# Patient Record
Sex: FEMALE | Race: BLACK OR AFRICAN AMERICAN | NOT HISPANIC OR LATINO | Employment: FULL TIME | URBAN - METROPOLITAN AREA
[De-identification: names, ages, dates, MRNs, and addresses within clinical notes are randomized per-mention and may not be internally consistent; named-entity substitution may affect disease eponyms.]

---

## 2018-09-19 ENCOUNTER — OFFICE VISIT (OUTPATIENT)
Dept: OBSTETRICS AND GYNECOLOGY | Facility: CLINIC | Age: 23
End: 2018-09-19
Payer: COMMERCIAL

## 2018-09-19 VITALS
BODY MASS INDEX: 30.12 KG/M2 | WEIGHT: 180.75 LBS | SYSTOLIC BLOOD PRESSURE: 122 MMHG | DIASTOLIC BLOOD PRESSURE: 70 MMHG | HEIGHT: 65 IN

## 2018-09-19 DIAGNOSIS — Z30.011 ENCOUNTER FOR INITIAL PRESCRIPTION OF CONTRACEPTIVE PILLS: ICD-10-CM

## 2018-09-19 DIAGNOSIS — A60.9 HSV (HERPES SIMPLEX VIRUS) ANOGENITAL INFECTION: ICD-10-CM

## 2018-09-19 DIAGNOSIS — Z01.419 ENCOUNTER FOR GYNECOLOGICAL EXAMINATION WITHOUT ABNORMAL FINDING: Primary | ICD-10-CM

## 2018-09-19 DIAGNOSIS — Z11.3 SCREENING FOR STDS (SEXUALLY TRANSMITTED DISEASES): ICD-10-CM

## 2018-09-19 PROCEDURE — 99385 PREV VISIT NEW AGE 18-39: CPT | Mod: S$GLB,,, | Performed by: OBSTETRICS & GYNECOLOGY

## 2018-09-19 PROCEDURE — 88175 CYTOPATH C/V AUTO FLUID REDO: CPT

## 2018-09-19 PROCEDURE — 87491 CHLMYD TRACH DNA AMP PROBE: CPT

## 2018-09-19 PROCEDURE — 99999 PR PBB SHADOW E&M-EST. PATIENT-LVL II: CPT | Mod: PBBFAC,,, | Performed by: OBSTETRICS & GYNECOLOGY

## 2018-09-19 RX ORDER — DROSPIRENONE AND ETHINYL ESTRADIOL 0.02-3(28)
1 KIT ORAL DAILY
Qty: 28 TABLET | Refills: 12 | Status: SHIPPED | OUTPATIENT
Start: 2018-09-19 | End: 2019-08-19 | Stop reason: SDUPTHER

## 2018-09-19 RX ORDER — VALACYCLOVIR HYDROCHLORIDE 500 MG/1
500 TABLET, FILM COATED ORAL DAILY
Qty: 30 TABLET | Refills: 12 | Status: SHIPPED | OUTPATIENT
Start: 2018-09-19 | End: 2019-09-14 | Stop reason: SDUPTHER

## 2018-09-19 NOTE — PROGRESS NOTES
9/19/2018    Chlamydia, Amplified DNA Not Detected   N gonorrhoeae, amplified DNA Not Detected       PT HERE FOR ANNUAL.  WOULD LIKE STD SWAB.  SEEMS TO HAVE HSV OUTBREAKS BEFORE HER CYCLE. TAKES VALTREX EPISODIC.  NEEDS REFILL OCP.    ROS:  GENERAL: No fever, chills, fatigability or weight loss.  VULVAR: No pain, no lesions and no itching.  VAGINAL: No relaxation, no itching, no discharge, no abnormal bleeding and no lesions.  ABDOMEN: No abdominal pain. Denies nausea. Denies vomiting. No diarrhea. No constipation  BREAST: Denies pain. No lumps. No discharge.  URINARY: No incontinence, no nocturia, no frequency and no dysuria.  CARDIOVASCULAR: No chest pain. No shortness of breath. No leg cramps.  NEUROLOGICAL: No headaches. No vision changes.  The remainder of the review of systems was negative.    PE:  General Appearance: overweight And Well developed. Well nourished. In no acute distress.  Vulva: Lesions: No.  Urethral Meatus: Normal size. Normal location. No lesions. No prolapse.  Urethra: No masses. No tenderness. No prolapse. No scarring.  Bladder: No masses. No tenderness.  Vagina: Mucosa NI:no discharge no, atrophy no, cystocele no or rectocele no.  Cervix: Lesion: no  Stenotic: no Cervical motion tenderness: no  Uterus: Uterus size: 5 weeks. Support good. Uterus size: Normal  Adnexa: Masses: No Tenderness: No CDS Nodularity: No  Abdomen: overweight No masses. No tenderness.  Breasts: No bilateral masses. No bilateral discharge. No bilateral tenderness. No bilateral fibrocystic changes.  Neck: No thyroid enlargement. No thyroid masses.  Skin: Rashes: No      PROCEDURES:    PLAN:     DIAGNOSIS:  1. Encounter for gynecological examination without abnormal finding    2. Screening for STDs (sexually transmitted diseases)    3. Encounter for initial prescription of contraceptive pills    4. HSV (herpes simplex virus) anogenital infection        MEDICATIONS & ORDERS:  Orders Placed This Encounter    C.  trachomatis/N. gonorrhoeae by AMP DNA    Liquid-based pap smear, screening    drospirenone-ethinyl estradiol (VESTURA, 28,) 3-0.02 mg per tablet    valACYclovir (VALTREX) 500 MG tablet---PROPHYLACTIC       Patient was counseled today on the new ACS guidelines for cervical cytology screening as well as the current recommendations for breast cancer screening. She was counseled to follow up with her PCP for other routine health maintenance. Counseling session lasted approximately 10 minutes, and all her questions were answered.     The use of hormonal contraception has been fully discussed with the patient. We discussed all options including OCPs, transdermal patches, vaginal ring, Depo Provera injections, Implanon, and IUD. Warnings about anticipated minor side effects such as breakthrough spotting, nausea, breast tenderness, weight changes, acne, headaches, etc were given. She has been told of the more serious potential side effects such as MI, stroke, and deep vein thrombosis, all of which are very unlikely. She has been asked to report any signs of such serious problems immediately. The need for additional protection, such as a condom, to prevent exposure to sexually transmitted diseases has also been discussed- the patient has been clearly reminded that no hormonal contraceptive method can protect her against diseases such as HIV and others. She understands and wishes to take the medication as prescribed. She wishes to begin oral contraceptives (estrogen/progesterone)        FOLLOW-UP: With me in 12 month      Answers for HPI/ROS submitted by the patient on 9/17/2018   Gynecologic exam  Pregnant now?: No  Sexual activity: sexually active  Birth control: oral contraceptives  STD: Yes

## 2018-09-20 LAB
C TRACH DNA SPEC QL NAA+PROBE: NOT DETECTED
N GONORRHOEA DNA SPEC QL NAA+PROBE: NOT DETECTED

## 2019-09-13 ENCOUNTER — TELEPHONE (OUTPATIENT)
Dept: OBSTETRICS AND GYNECOLOGY | Facility: CLINIC | Age: 24
End: 2019-09-13

## 2019-09-13 NOTE — TELEPHONE ENCOUNTER
----- Message from Darnell Henriquez sent at 9/13/2019  9:41 AM CDT -----  PT NEEDS A REFILL ON HER JANAY TREX SHE HAS A APPT ON 09/20 PHARMACY # [phone]

## 2019-09-16 RX ORDER — VALACYCLOVIR HYDROCHLORIDE 500 MG/1
TABLET, FILM COATED ORAL
Qty: 30 TABLET | Refills: 12 | Status: SHIPPED | OUTPATIENT
Start: 2019-09-16 | End: 2019-10-14 | Stop reason: SDUPTHER

## 2019-10-14 ENCOUNTER — OFFICE VISIT (OUTPATIENT)
Dept: OBSTETRICS AND GYNECOLOGY | Facility: CLINIC | Age: 24
End: 2019-10-14
Payer: COMMERCIAL

## 2019-10-14 VITALS
DIASTOLIC BLOOD PRESSURE: 82 MMHG | BODY MASS INDEX: 31.22 KG/M2 | HEIGHT: 65 IN | SYSTOLIC BLOOD PRESSURE: 126 MMHG | WEIGHT: 187.38 LBS

## 2019-10-14 DIAGNOSIS — Z11.3 SCREENING FOR STDS (SEXUALLY TRANSMITTED DISEASES): ICD-10-CM

## 2019-10-14 DIAGNOSIS — Z01.419 ENCOUNTER FOR GYNECOLOGICAL EXAMINATION WITHOUT ABNORMAL FINDING: Primary | ICD-10-CM

## 2019-10-14 DIAGNOSIS — Z30.011 ENCOUNTER FOR INITIAL PRESCRIPTION OF CONTRACEPTIVE PILLS: ICD-10-CM

## 2019-10-14 PROCEDURE — 99395 PREV VISIT EST AGE 18-39: CPT | Mod: S$GLB,,, | Performed by: OBSTETRICS & GYNECOLOGY

## 2019-10-14 PROCEDURE — 87491 CHLMYD TRACH DNA AMP PROBE: CPT

## 2019-10-14 PROCEDURE — 99999 PR PBB SHADOW E&M-EST. PATIENT-LVL III: CPT | Mod: PBBFAC,,, | Performed by: OBSTETRICS & GYNECOLOGY

## 2019-10-14 PROCEDURE — 99999 PR PBB SHADOW E&M-EST. PATIENT-LVL III: ICD-10-PCS | Mod: PBBFAC,,, | Performed by: OBSTETRICS & GYNECOLOGY

## 2019-10-14 PROCEDURE — 99395 PR PREVENTIVE VISIT,EST,18-39: ICD-10-PCS | Mod: S$GLB,,, | Performed by: OBSTETRICS & GYNECOLOGY

## 2019-10-14 RX ORDER — VALACYCLOVIR HYDROCHLORIDE 500 MG/1
500 TABLET, FILM COATED ORAL DAILY
Qty: 30 TABLET | Refills: 12 | Status: SHIPPED | OUTPATIENT
Start: 2019-10-14 | End: 2020-09-28

## 2019-10-14 RX ORDER — DROSPIRENONE AND ETHINYL ESTRADIOL 0.02-3(28)
1 KIT ORAL DAILY
Qty: 28 TABLET | Refills: 12 | Status: SHIPPED | OUTPATIENT
Start: 2019-10-14 | End: 2020-10-19

## 2019-10-14 NOTE — PROGRESS NOTES
Pt here for annual.  NEEDS OCP AND VALTREX QD.  WANTS GC/CT.    ROS:  GENERAL: No fever, chills, fatigability or weight loss.  VULVAR: No pain, no lesions and no itching.  VAGINAL: No relaxation, no itching, no discharge, no abnormal bleeding and no lesions.  ABDOMEN: No abdominal pain. Denies nausea. Denies vomiting. No diarrhea. No constipation  BREAST: Denies pain. No lumps. No discharge.  URINARY: No incontinence, no nocturia, no frequency and no dysuria.  CARDIOVASCULAR: No chest pain. No shortness of breath. No leg cramps.  NEUROLOGICAL: No headaches. No vision changes.  The remainder of the review of systems was negative.    PE:  General Appearance: overweight And Well developed. Well nourished. In no acute distress.  Vulva: Lesions: No.  Urethral Meatus: Normal size. Normal location. No lesions. No prolapse.  Urethra: No masses. No tenderness. No prolapse. No scarring.  Bladder: No masses. No tenderness.  Vagina: Mucosa NI:yes discharge no, atrophy no, cystocele no or rectocele no.  Cervix: Lesion: no  Stenotic: no Cervical motion tenderness: no  Uterus: Uterus size: 5 weeks. Support good. Uterus size: Normal  Adnexa: Masses: No Tenderness: No CDS Nodularity: No  Abdomen: overweight No masses. No tenderness.  Breasts: No bilateral masses. No bilateral discharge. No bilateral tenderness. No bilateral fibrocystic changes.  Neck: No thyroid enlargement. No thyroid masses.  Skin: Rashes: No      PROCEDURES:    PLAN:     DIAGNOSIS:  1. Encounter for gynecological examination without abnormal finding    2. Encounter for initial prescription of contraceptive pills    3. Screening for STDs (sexually transmitted diseases)        MEDICATIONS & ORDERS:  Orders Placed This Encounter    C. trachomatis/N. gonorrhoeae by AMP DNA    drospirenone-ethinyl estradiol (VESTURA, 28,) 3-0.02 mg per tablet    valACYclovir (VALTREX) 500 MG tablet       Patient was counseled today on the new ACS guidelines for cervical cytology  screening as well as the current recommendations for breast cancer screening. She was counseled to follow up with her PCP for other routine health maintenance. Counseling session lasted approximately 10 minutes, and all her questions were answered.         FOLLOW-UP: With me in 12 month

## 2019-10-15 LAB
C TRACH DNA SPEC QL NAA+PROBE: NOT DETECTED
N GONORRHOEA DNA SPEC QL NAA+PROBE: NOT DETECTED

## 2020-02-24 ENCOUNTER — TELEPHONE (OUTPATIENT)
Dept: OBSTETRICS AND GYNECOLOGY | Facility: CLINIC | Age: 25
End: 2020-02-24

## 2020-02-24 NOTE — TELEPHONE ENCOUNTER
----- Message from Karen Silva sent at 2/24/2020 11:39 AM CST -----  Contact: ELMO CARRASCO [[id number]]  Type:  Patient Returning Call    Who Called: ELMO CARRASCO [[id number]]    Who Left Message for Patient: Edison    Does the patient know what this is regarding?: Birth control    Can the clinic reply in [facility]: No    Best Call Back Number: [phone]    Additional Information: N/A

## 2020-02-24 NOTE — TELEPHONE ENCOUNTER
----- Message from Gregory Solis sent at 2/24/2020  9:46 AM CST -----  Contact: ELMO CARRASCO [[id number]]  Name of Who is Calling: ELMO CARRASCO [[id number]]      What is the request in detail: Would like to speak with staff in regards to an birth control alternative. [facility] pharmacy changed her to Gianvi which is causing facial out break and spotting. Please advise      Can the clinic reply by [facility]: yes      What Number to Call Back if not in [facility]: [phone]

## 2020-02-24 NOTE — TELEPHONE ENCOUNTER
----- Message from Kendall Hunt sent at 2/24/2020  2:52 PM CST -----  Contact: Patient  Patient is returning missed phone call from Theresa.  Patient phone number [phone]. Thanks!

## 2020-02-26 RX ORDER — DROSPIRENONE AND ETHINYL ESTRADIOL 0.02-3(28)
1 KIT ORAL DAILY
Qty: 28 TABLET | Refills: 12 | Status: SHIPPED | OUTPATIENT
Start: 2020-02-26 | End: 2020-03-27

## 2020-09-28 RX ORDER — VALACYCLOVIR HYDROCHLORIDE 500 MG/1
TABLET, FILM COATED ORAL
Qty: 30 TABLET | Refills: 12 | Status: SHIPPED | OUTPATIENT
Start: 2020-09-28 | End: 2021-09-13

## 2020-09-28 NOTE — TELEPHONE ENCOUNTER
----- Message from Lashell Turcios sent at 9/28/2020 10:16 AM CDT -----  Contact: ELMO CARRASCO [[id number]]  Type: RX Refill Request    Who Called: ELMO CARRASCO [[id number]]    RX Name and Strength: valACYclovir (VALTREX) 500 MG tablet    Preferred Pharmacy with phone number: ..  [facility]  [address], [place] [address]  [phone]       Best Call Back Number: ..[phone]    Additional Information: Pt moved and needs the RX sent to new pharmacy

## 2020-10-19 ENCOUNTER — OFFICE VISIT (OUTPATIENT)
Dept: OBSTETRICS AND GYNECOLOGY | Facility: CLINIC | Age: 25
End: 2020-10-19
Payer: COMMERCIAL

## 2020-10-19 VITALS
SYSTOLIC BLOOD PRESSURE: 120 MMHG | WEIGHT: 187.38 LBS | DIASTOLIC BLOOD PRESSURE: 60 MMHG | HEIGHT: 66 IN | BODY MASS INDEX: 30.11 KG/M2

## 2020-10-19 DIAGNOSIS — Z01.419 ENCOUNTER FOR GYNECOLOGICAL EXAMINATION WITHOUT ABNORMAL FINDING: Primary | ICD-10-CM

## 2020-10-19 PROCEDURE — 99395 PR PREVENTIVE VISIT,EST,18-39: ICD-10-PCS | Mod: S$GLB,,, | Performed by: OBSTETRICS & GYNECOLOGY

## 2020-10-19 PROCEDURE — 99395 PREV VISIT EST AGE 18-39: CPT | Mod: S$GLB,,, | Performed by: OBSTETRICS & GYNECOLOGY

## 2020-10-19 PROCEDURE — 99999 PR PBB SHADOW E&M-EST. PATIENT-LVL III: ICD-10-PCS | Mod: PBBFAC,,, | Performed by: OBSTETRICS & GYNECOLOGY

## 2020-10-19 PROCEDURE — 99999 PR PBB SHADOW E&M-EST. PATIENT-LVL III: CPT | Mod: PBBFAC,,, | Performed by: OBSTETRICS & GYNECOLOGY

## 2020-10-19 RX ORDER — NORETHINDRONE ACETATE AND ETHINYL ESTRADIOL .03; 1.5 MG/1; MG/1
1 TABLET ORAL DAILY
Qty: 28 EACH | Refills: 12 | Status: SHIPPED | OUTPATIENT
Start: 2020-10-19 | End: 2021-07-28

## 2020-10-19 NOTE — PROGRESS NOTES
PT HERE FOR ANNUAL.  SHE FEELS MOR EMOTIONAL ON ASIF.    ROS:  GENERAL: No fever, chills, fatigability or weight loss.  VULVAR: No pain, no lesions and no itching.  VAGINAL: No relaxation, no itching, no discharge, no abnormal bleeding and no lesions.  ABDOMEN: No abdominal pain. Denies nausea. Denies vomiting. No diarrhea. No constipation  BREAST: Denies pain. No lumps. No discharge.  URINARY: No incontinence, no nocturia, no frequency and no dysuria.  CARDIOVASCULAR: No chest pain. No shortness of breath. No leg cramps.  NEUROLOGICAL: No headaches. No vision changes.  The remainder of the review of systems was negative.    PE:  General Appearance: overweight And Well developed. Well nourished. In no acute distress.  Vulva: Lesions: No.  Urethral Meatus: Normal size. Normal location. No lesions. No prolapse.  Urethra: No masses. No tenderness. No prolapse. No scarring.  Bladder: No masses. No tenderness.  Vagina: Mucosa NI:yes discharge no, atrophy no, cystocele no or rectocele no.  Cervix: Lesion: no  Stenotic: no Cervical motion tenderness: no  Uterus: Uterus size: 5 weeks. Support good. Uterus size: Normal  Adnexa: Masses: No Tenderness: No CDS Nodularity: No  Abdomen: overweight No masses. No tenderness.  Breasts: No bilateral masses. No bilateral discharge. No bilateral tenderness. No bilateral fibrocystic changes.  Neck: No thyroid enlargement. No thyroid masses.  Skin: Rashes: No      PROCEDURES:    PLAN:     DIAGNOSIS:  1. Encounter for gynecological examination without abnormal finding        MEDICATIONS & ORDERS:  Orders Placed This Encounter    norethindrone ac-eth estradioL (LOESTRIN 1.5/30, 21,) 1.5-30 mg-mcg Tab       Patient was counseled today on the new ACS guidelines for cervical cytology screening as well as the current recommendations for breast cancer screening. She was counseled to follow up with her PCP for other routine health maintenance. Counseling session lasted approximately 10 minutes,  and all her questions were answered.     The use of hormonal contraception has been fully discussed with the patient. We discussed all options including OCPs, transdermal patches, vaginal ring, Depo Provera injections, Implanon, and IUD. Warnings about anticipated minor side effects such as breakthrough spotting, nausea, breast tenderness, weight changes, acne, headaches, etc were given. She has been told of the more serious potential side effects such as MI, stroke, and deep vein thrombosis, all of which are very unlikely. She has been asked to report any signs of such serious problems immediately. The need for additional protection, such as a condom, to prevent exposure to sexually transmitted diseases has also been discussed- the patient has been clearly reminded that no hormonal contraceptive method can protect her against diseases such as HIV and others. She understands and wishes to take the medication as prescribed. She wishes to begin oral contraceptives (estrogen/progesterone)        FOLLOW-UP: With me in 12 month

## 2021-04-22 ENCOUNTER — PATIENT MESSAGE (OUTPATIENT)
Dept: UROLOGY | Facility: CLINIC | Age: 26
End: 2021-04-22

## 2021-05-10 ENCOUNTER — PATIENT MESSAGE (OUTPATIENT)
Dept: RESEARCH | Facility: HOSPITAL | Age: 26
End: 2021-05-10

## 2021-09-13 ENCOUNTER — OFFICE VISIT (OUTPATIENT)
Dept: OBSTETRICS AND GYNECOLOGY | Facility: CLINIC | Age: 26
End: 2021-09-13
Payer: COMMERCIAL

## 2021-09-13 VITALS — HEIGHT: 66 IN | WEIGHT: 178.56 LBS | BODY MASS INDEX: 28.7 KG/M2

## 2021-09-13 DIAGNOSIS — Z32.01 PREGNANCY CONFIRMED BY POSITIVE URINE TEST: ICD-10-CM

## 2021-09-13 DIAGNOSIS — N91.2 AMENORRHEA: Primary | ICD-10-CM

## 2021-09-13 PROBLEM — Z34.90 PREGNANT: Status: ACTIVE | Noted: 2021-09-13

## 2021-09-13 LAB
B-HCG UR QL: POSITIVE
CTP QC/QA: YES

## 2021-09-13 PROCEDURE — 3008F BODY MASS INDEX DOCD: CPT | Mod: CPTII,S$GLB,, | Performed by: OBSTETRICS & GYNECOLOGY

## 2021-09-13 PROCEDURE — 99214 OFFICE O/P EST MOD 30 MIN: CPT | Mod: S$GLB,,, | Performed by: OBSTETRICS & GYNECOLOGY

## 2021-09-13 PROCEDURE — 99999 PR PBB SHADOW E&M-EST. PATIENT-LVL III: CPT | Mod: PBBFAC,,, | Performed by: OBSTETRICS & GYNECOLOGY

## 2021-09-13 PROCEDURE — 1160F PR REVIEW ALL MEDS BY PRESCRIBER/CLIN PHARMACIST DOCUMENTED: ICD-10-PCS | Mod: CPTII,S$GLB,, | Performed by: OBSTETRICS & GYNECOLOGY

## 2021-09-13 PROCEDURE — 1159F PR MEDICATION LIST DOCUMENTED IN MEDICAL RECORD: ICD-10-PCS | Mod: CPTII,S$GLB,, | Performed by: OBSTETRICS & GYNECOLOGY

## 2021-09-13 PROCEDURE — 1160F RVW MEDS BY RX/DR IN RCRD: CPT | Mod: CPTII,S$GLB,, | Performed by: OBSTETRICS & GYNECOLOGY

## 2021-09-13 PROCEDURE — 87086 URINE CULTURE/COLONY COUNT: CPT | Performed by: OBSTETRICS & GYNECOLOGY

## 2021-09-13 PROCEDURE — 99214 PR OFFICE/OUTPT VISIT, EST, LEVL IV, 30-39 MIN: ICD-10-PCS | Mod: S$GLB,,, | Performed by: OBSTETRICS & GYNECOLOGY

## 2021-09-13 PROCEDURE — 99999 PR PBB SHADOW E&M-EST. PATIENT-LVL III: ICD-10-PCS | Mod: PBBFAC,,, | Performed by: OBSTETRICS & GYNECOLOGY

## 2021-09-13 PROCEDURE — 3008F PR BODY MASS INDEX (BMI) DOCUMENTED: ICD-10-PCS | Mod: CPTII,S$GLB,, | Performed by: OBSTETRICS & GYNECOLOGY

## 2021-09-13 PROCEDURE — 81025 POCT URINE PREGNANCY: ICD-10-PCS | Mod: S$GLB,,, | Performed by: OBSTETRICS & GYNECOLOGY

## 2021-09-13 PROCEDURE — 88175 CYTOPATH C/V AUTO FLUID REDO: CPT | Performed by: OBSTETRICS & GYNECOLOGY

## 2021-09-13 PROCEDURE — 87491 CHLMYD TRACH DNA AMP PROBE: CPT | Performed by: OBSTETRICS & GYNECOLOGY

## 2021-09-13 PROCEDURE — 1159F MED LIST DOCD IN RCRD: CPT | Mod: CPTII,S$GLB,, | Performed by: OBSTETRICS & GYNECOLOGY

## 2021-09-13 PROCEDURE — 87591 N.GONORRHOEAE DNA AMP PROB: CPT | Performed by: OBSTETRICS & GYNECOLOGY

## 2021-09-13 PROCEDURE — 81025 URINE PREGNANCY TEST: CPT | Mod: S$GLB,,, | Performed by: OBSTETRICS & GYNECOLOGY

## 2021-09-13 RX ORDER — PROMETHAZINE HYDROCHLORIDE 25 MG/1
25 TABLET ORAL EVERY 4 HOURS
Qty: 30 TABLET | Refills: 3 | Status: SHIPPED | OUTPATIENT
Start: 2021-09-13 | End: 2021-10-21

## 2021-09-14 LAB
C TRACH DNA SPEC QL NAA+PROBE: NOT DETECTED
N GONORRHOEA DNA SPEC QL NAA+PROBE: NOT DETECTED

## 2021-09-15 LAB
BACTERIA UR CULT: NORMAL
BACTERIA UR CULT: NORMAL

## 2021-09-16 LAB
FINAL PATHOLOGIC DIAGNOSIS: NORMAL
Lab: NORMAL

## 2021-09-20 ENCOUNTER — PROCEDURE VISIT (OUTPATIENT)
Dept: OBSTETRICS AND GYNECOLOGY | Facility: CLINIC | Age: 26
End: 2021-09-20
Payer: COMMERCIAL

## 2021-09-20 DIAGNOSIS — Z32.01 PREGNANCY CONFIRMED BY POSITIVE URINE TEST: ICD-10-CM

## 2021-09-20 DIAGNOSIS — Z36.89 ESTABLISH GESTATIONAL AGE, ULTRASOUND: ICD-10-CM

## 2021-09-20 PROCEDURE — 76801 PR US, OB <14WKS, TRANSABD, SINGLE GESTATION: ICD-10-PCS | Mod: S$GLB,,, | Performed by: OBSTETRICS & GYNECOLOGY

## 2021-09-20 PROCEDURE — 76801 OB US < 14 WKS SINGLE FETUS: CPT | Mod: S$GLB,,, | Performed by: OBSTETRICS & GYNECOLOGY

## 2021-09-23 ENCOUNTER — PATIENT MESSAGE (OUTPATIENT)
Dept: OBSTETRICS AND GYNECOLOGY | Facility: CLINIC | Age: 26
End: 2021-09-23

## 2021-09-27 ENCOUNTER — LAB VISIT (OUTPATIENT)
Dept: LAB | Facility: HOSPITAL | Age: 26
End: 2021-09-27
Attending: OBSTETRICS & GYNECOLOGY
Payer: COMMERCIAL

## 2021-09-27 DIAGNOSIS — Z32.01 PREGNANCY CONFIRMED BY POSITIVE URINE TEST: ICD-10-CM

## 2021-09-27 LAB
ABO + RH BLD: NORMAL
ANISOCYTOSIS BLD QL SMEAR: SLIGHT
BASOPHILS # BLD AUTO: 0.03 K/UL (ref 0–0.2)
BASOPHILS NFR BLD: 0.2 % (ref 0–1.9)
BLD GP AB SCN CELLS X3 SERPL QL: NORMAL
BURR CELLS BLD QL SMEAR: ABNORMAL
DIFFERENTIAL METHOD: ABNORMAL
EOSINOPHIL # BLD AUTO: 0.1 K/UL (ref 0–0.5)
EOSINOPHIL NFR BLD: 0.5 % (ref 0–8)
ERYTHROCYTE [DISTWIDTH] IN BLOOD BY AUTOMATED COUNT: 12.6 % (ref 11.5–14.5)
HCT VFR BLD AUTO: 38.4 % (ref 37–48.5)
HGB BLD-MCNC: 12.7 G/DL (ref 12–16)
IMM GRANULOCYTES # BLD AUTO: 0.05 K/UL (ref 0–0.04)
IMM GRANULOCYTES NFR BLD AUTO: 0.4 % (ref 0–0.5)
LYMPHOCYTES # BLD AUTO: 3 K/UL (ref 1–4.8)
LYMPHOCYTES NFR BLD: 22.9 % (ref 18–48)
MCH RBC QN AUTO: 30.5 PG (ref 27–31)
MCHC RBC AUTO-ENTMCNC: 33.1 G/DL (ref 32–36)
MCV RBC AUTO: 92 FL (ref 82–98)
MONOCYTES # BLD AUTO: 0.9 K/UL (ref 0.3–1)
MONOCYTES NFR BLD: 7.1 % (ref 4–15)
NEUTROPHILS # BLD AUTO: 9 K/UL (ref 1.8–7.7)
NEUTROPHILS NFR BLD: 68.9 % (ref 38–73)
NRBC BLD-RTO: 0 /100 WBC
PLATELET # BLD AUTO: 248 K/UL (ref 150–450)
PLATELET BLD QL SMEAR: ABNORMAL
PMV BLD AUTO: 11.5 FL (ref 9.2–12.9)
POIKILOCYTOSIS BLD QL SMEAR: SLIGHT
POLYCHROMASIA BLD QL SMEAR: ABNORMAL
RBC # BLD AUTO: 4.17 M/UL (ref 4–5.4)
WBC # BLD AUTO: 13 K/UL (ref 3.9–12.7)

## 2021-09-27 PROCEDURE — 81220 CFTR GENE COM VARIANTS: CPT | Performed by: OBSTETRICS & GYNECOLOGY

## 2021-09-27 PROCEDURE — 87389 HIV-1 AG W/HIV-1&-2 AB AG IA: CPT | Performed by: OBSTETRICS & GYNECOLOGY

## 2021-09-27 PROCEDURE — 86900 BLOOD TYPING SEROLOGIC ABO: CPT | Performed by: OBSTETRICS & GYNECOLOGY

## 2021-09-27 PROCEDURE — 83020 HEMOGLOBIN ELECTROPHORESIS: CPT | Performed by: OBSTETRICS & GYNECOLOGY

## 2021-09-27 PROCEDURE — 86592 SYPHILIS TEST NON-TREP QUAL: CPT | Performed by: OBSTETRICS & GYNECOLOGY

## 2021-09-27 PROCEDURE — 86762 RUBELLA ANTIBODY: CPT | Performed by: OBSTETRICS & GYNECOLOGY

## 2021-09-27 PROCEDURE — 85025 COMPLETE CBC W/AUTO DIFF WBC: CPT | Performed by: OBSTETRICS & GYNECOLOGY

## 2021-09-27 PROCEDURE — 87340 HEPATITIS B SURFACE AG IA: CPT | Performed by: OBSTETRICS & GYNECOLOGY

## 2021-09-28 LAB
HBV SURFACE AG SERPL QL IA: NEGATIVE
HIV 1+2 AB+HIV1 P24 AG SERPL QL IA: NEGATIVE
RPR SER QL: NORMAL
RUBV IGG SER-ACNC: 29.4 IU/ML
RUBV IGG SER-IMP: REACTIVE

## 2021-09-29 LAB
HGB A2 MFR BLD HPLC: 2.7 % (ref 2.2–3.2)
HGB FRACT BLD ELPH-IMP: NORMAL
HGB FRACT BLD ELPH-IMP: NORMAL

## 2021-09-30 LAB — CFTR MUT ANL BLD/T: NORMAL

## 2021-10-18 ENCOUNTER — PATIENT MESSAGE (OUTPATIENT)
Dept: OBSTETRICS AND GYNECOLOGY | Facility: CLINIC | Age: 26
End: 2021-10-18
Payer: COMMERCIAL

## 2021-10-21 ENCOUNTER — INITIAL PRENATAL (OUTPATIENT)
Dept: OBSTETRICS AND GYNECOLOGY | Facility: CLINIC | Age: 26
End: 2021-10-21
Payer: OTHER GOVERNMENT

## 2021-10-21 VITALS
DIASTOLIC BLOOD PRESSURE: 74 MMHG | BODY MASS INDEX: 30.78 KG/M2 | SYSTOLIC BLOOD PRESSURE: 116 MMHG | WEIGHT: 190.69 LBS

## 2021-10-21 DIAGNOSIS — Z3A.12 PREGNANCY WITH 12 COMPLETED WEEKS GESTATION: Primary | ICD-10-CM

## 2021-10-21 PROCEDURE — 99999 PR PBB SHADOW E&M-EST. PATIENT-LVL II: CPT | Mod: PBBFAC,,, | Performed by: OBSTETRICS & GYNECOLOGY

## 2021-10-21 PROCEDURE — 99212 OFFICE O/P EST SF 10 MIN: CPT | Mod: PBBFAC | Performed by: OBSTETRICS & GYNECOLOGY

## 2021-10-21 PROCEDURE — 99999 PR PBB SHADOW E&M-EST. PATIENT-LVL II: ICD-10-PCS | Mod: PBBFAC,,, | Performed by: OBSTETRICS & GYNECOLOGY

## 2021-10-21 PROCEDURE — 0502F SUBSEQUENT PRENATAL CARE: CPT | Mod: S$GLB,,, | Performed by: OBSTETRICS & GYNECOLOGY

## 2021-10-21 PROCEDURE — 0502F PR SUBSEQUENT PRENATAL CARE: ICD-10-PCS | Mod: S$GLB,,, | Performed by: OBSTETRICS & GYNECOLOGY

## 2021-10-27 ENCOUNTER — PATIENT MESSAGE (OUTPATIENT)
Dept: ADMINISTRATIVE | Facility: OTHER | Age: 26
End: 2021-10-27
Payer: OTHER GOVERNMENT

## 2021-11-19 ENCOUNTER — ROUTINE PRENATAL (OUTPATIENT)
Dept: OBSTETRICS AND GYNECOLOGY | Facility: CLINIC | Age: 26
End: 2021-11-19
Payer: OTHER GOVERNMENT

## 2021-11-19 ENCOUNTER — LAB VISIT (OUTPATIENT)
Dept: LAB | Facility: OTHER | Age: 26
End: 2021-11-19
Attending: PHYSICIAN ASSISTANT
Payer: OTHER GOVERNMENT

## 2021-11-19 VITALS
SYSTOLIC BLOOD PRESSURE: 142 MMHG | WEIGHT: 198.19 LBS | DIASTOLIC BLOOD PRESSURE: 76 MMHG | BODY MASS INDEX: 31.99 KG/M2

## 2021-11-19 DIAGNOSIS — R03.0 ELEVATED BLOOD PRESSURE READING: ICD-10-CM

## 2021-11-19 DIAGNOSIS — Z3A.16 16 WEEKS GESTATION OF PREGNANCY: Primary | ICD-10-CM

## 2021-11-19 LAB
ALBUMIN SERPL BCP-MCNC: 3.3 G/DL (ref 3.5–5.2)
ALP SERPL-CCNC: 43 U/L (ref 55–135)
ALT SERPL W/O P-5'-P-CCNC: 17 U/L (ref 10–44)
ANION GAP SERPL CALC-SCNC: 12 MMOL/L (ref 8–16)
AST SERPL-CCNC: 20 U/L (ref 10–40)
BASOPHILS # BLD AUTO: 0.02 K/UL (ref 0–0.2)
BASOPHILS NFR BLD: 0.1 % (ref 0–1.9)
BILIRUB SERPL-MCNC: 0.2 MG/DL (ref 0.1–1)
BUN SERPL-MCNC: 7 MG/DL (ref 6–20)
CALCIUM SERPL-MCNC: 9.3 MG/DL (ref 8.7–10.5)
CHLORIDE SERPL-SCNC: 106 MMOL/L (ref 95–110)
CO2 SERPL-SCNC: 19 MMOL/L (ref 23–29)
CREAT SERPL-MCNC: 0.5 MG/DL (ref 0.5–1.4)
CREAT UR-MCNC: 121 MG/DL (ref 15–325)
DIFFERENTIAL METHOD: ABNORMAL
EOSINOPHIL # BLD AUTO: 0.1 K/UL (ref 0–0.5)
EOSINOPHIL NFR BLD: 0.4 % (ref 0–8)
ERYTHROCYTE [DISTWIDTH] IN BLOOD BY AUTOMATED COUNT: 12.5 % (ref 11.5–14.5)
EST. GFR  (AFRICAN AMERICAN): >60 ML/MIN/1.73 M^2
EST. GFR  (NON AFRICAN AMERICAN): >60 ML/MIN/1.73 M^2
GLUCOSE SERPL-MCNC: 73 MG/DL (ref 70–110)
HCT VFR BLD AUTO: 37.7 % (ref 37–48.5)
HGB BLD-MCNC: 12.3 G/DL (ref 12–16)
IMM GRANULOCYTES # BLD AUTO: 0.11 K/UL (ref 0–0.04)
IMM GRANULOCYTES NFR BLD AUTO: 0.8 % (ref 0–0.5)
LDH SERPL L TO P-CCNC: 119 U/L (ref 110–260)
LYMPHOCYTES # BLD AUTO: 2.3 K/UL (ref 1–4.8)
LYMPHOCYTES NFR BLD: 15.9 % (ref 18–48)
MCH RBC QN AUTO: 30.3 PG (ref 27–31)
MCHC RBC AUTO-ENTMCNC: 32.6 G/DL (ref 32–36)
MCV RBC AUTO: 93 FL (ref 82–98)
MONOCYTES # BLD AUTO: 1.1 K/UL (ref 0.3–1)
MONOCYTES NFR BLD: 7.7 % (ref 4–15)
NEUTROPHILS # BLD AUTO: 10.7 K/UL (ref 1.8–7.7)
NEUTROPHILS NFR BLD: 75.1 % (ref 38–73)
NRBC BLD-RTO: 0 /100 WBC
PLATELET # BLD AUTO: 277 K/UL (ref 150–450)
PMV BLD AUTO: 10.3 FL (ref 9.2–12.9)
POTASSIUM SERPL-SCNC: 4.6 MMOL/L (ref 3.5–5.1)
PROT SERPL-MCNC: 6.9 G/DL (ref 6–8.4)
PROT UR-MCNC: 9 MG/DL (ref 0–15)
PROT/CREAT UR: 0.07 MG/G{CREAT} (ref 0–0.2)
RBC # BLD AUTO: 4.06 M/UL (ref 4–5.4)
SODIUM SERPL-SCNC: 137 MMOL/L (ref 136–145)
WBC # BLD AUTO: 14.21 K/UL (ref 3.9–12.7)

## 2021-11-19 PROCEDURE — 0502F SUBSEQUENT PRENATAL CARE: CPT | Mod: S$GLB,,, | Performed by: PHYSICIAN ASSISTANT

## 2021-11-19 PROCEDURE — 99999 PR PBB SHADOW E&M-EST. PATIENT-LVL II: ICD-10-PCS | Mod: PBBFAC,,, | Performed by: PHYSICIAN ASSISTANT

## 2021-11-19 PROCEDURE — 82570 ASSAY OF URINE CREATININE: CPT | Performed by: PHYSICIAN ASSISTANT

## 2021-11-19 PROCEDURE — 83615 LACTATE (LD) (LDH) ENZYME: CPT | Performed by: PHYSICIAN ASSISTANT

## 2021-11-19 PROCEDURE — 0502F PR SUBSEQUENT PRENATAL CARE: ICD-10-PCS | Mod: S$GLB,,, | Performed by: PHYSICIAN ASSISTANT

## 2021-11-19 PROCEDURE — 99999 PR PBB SHADOW E&M-EST. PATIENT-LVL II: CPT | Mod: PBBFAC,,, | Performed by: PHYSICIAN ASSISTANT

## 2021-11-19 PROCEDURE — 36415 COLL VENOUS BLD VENIPUNCTURE: CPT | Performed by: PHYSICIAN ASSISTANT

## 2021-11-19 PROCEDURE — 80053 COMPREHEN METABOLIC PANEL: CPT | Performed by: PHYSICIAN ASSISTANT

## 2021-11-19 PROCEDURE — 85025 COMPLETE CBC W/AUTO DIFF WBC: CPT | Performed by: PHYSICIAN ASSISTANT

## 2021-12-07 ENCOUNTER — PATIENT MESSAGE (OUTPATIENT)
Dept: MATERNAL FETAL MEDICINE | Facility: CLINIC | Age: 26
End: 2021-12-07
Payer: OTHER GOVERNMENT

## 2021-12-08 ENCOUNTER — TELEPHONE (OUTPATIENT)
Dept: RESEARCH | Facility: OTHER | Age: 26
End: 2021-12-08
Payer: OTHER GOVERNMENT

## 2021-12-08 ENCOUNTER — PROCEDURE VISIT (OUTPATIENT)
Dept: MATERNAL FETAL MEDICINE | Facility: CLINIC | Age: 26
End: 2021-12-08
Payer: OTHER GOVERNMENT

## 2021-12-08 DIAGNOSIS — Z3A.12 PREGNANCY WITH 12 COMPLETED WEEKS GESTATION: ICD-10-CM

## 2021-12-13 ENCOUNTER — ROUTINE PRENATAL (OUTPATIENT)
Dept: OBSTETRICS AND GYNECOLOGY | Facility: CLINIC | Age: 26
End: 2021-12-13
Payer: OTHER GOVERNMENT

## 2021-12-13 ENCOUNTER — LAB VISIT (OUTPATIENT)
Dept: LAB | Facility: HOSPITAL | Age: 26
End: 2021-12-13
Attending: OBSTETRICS & GYNECOLOGY
Payer: OTHER GOVERNMENT

## 2021-12-13 VITALS — WEIGHT: 200 LBS | DIASTOLIC BLOOD PRESSURE: 78 MMHG | BODY MASS INDEX: 32.28 KG/M2 | SYSTOLIC BLOOD PRESSURE: 142 MMHG

## 2021-12-13 DIAGNOSIS — Z3A.19 19 WEEKS GESTATION OF PREGNANCY: Primary | ICD-10-CM

## 2021-12-13 DIAGNOSIS — I10 HTN (HYPERTENSION), BENIGN: ICD-10-CM

## 2021-12-13 LAB
ALBUMIN SERPL BCP-MCNC: 3.6 G/DL (ref 3.5–5.2)
ALP SERPL-CCNC: 46 U/L (ref 55–135)
ALT SERPL W/O P-5'-P-CCNC: 12 U/L (ref 10–44)
ANION GAP SERPL CALC-SCNC: 15 MMOL/L (ref 8–16)
AST SERPL-CCNC: 16 U/L (ref 10–40)
BILIRUB SERPL-MCNC: 0.3 MG/DL (ref 0.1–1)
BUN SERPL-MCNC: 6 MG/DL (ref 6–20)
CALCIUM SERPL-MCNC: 9.6 MG/DL (ref 8.7–10.5)
CHLORIDE SERPL-SCNC: 104 MMOL/L (ref 95–110)
CO2 SERPL-SCNC: 19 MMOL/L (ref 23–29)
CREAT SERPL-MCNC: 0.6 MG/DL (ref 0.5–1.4)
EST. GFR  (AFRICAN AMERICAN): >60 ML/MIN/1.73 M^2
EST. GFR  (NON AFRICAN AMERICAN): >60 ML/MIN/1.73 M^2
GLUCOSE SERPL-MCNC: 70 MG/DL (ref 70–110)
LDH SERPL L TO P-CCNC: 154 U/L (ref 110–260)
POTASSIUM SERPL-SCNC: 3.6 MMOL/L (ref 3.5–5.1)
PROT SERPL-MCNC: 7.4 G/DL (ref 6–8.4)
SODIUM SERPL-SCNC: 138 MMOL/L (ref 136–145)

## 2021-12-13 PROCEDURE — 0502F SUBSEQUENT PRENATAL CARE: CPT | Mod: S$GLB,,, | Performed by: OBSTETRICS & GYNECOLOGY

## 2021-12-13 PROCEDURE — 0502F PR SUBSEQUENT PRENATAL CARE: ICD-10-PCS | Mod: S$GLB,,, | Performed by: OBSTETRICS & GYNECOLOGY

## 2021-12-13 PROCEDURE — 83615 LACTATE (LD) (LDH) ENZYME: CPT | Performed by: OBSTETRICS & GYNECOLOGY

## 2021-12-13 PROCEDURE — 99999 PR PBB SHADOW E&M-EST. PATIENT-LVL II: ICD-10-PCS | Mod: PBBFAC,,, | Performed by: OBSTETRICS & GYNECOLOGY

## 2021-12-13 PROCEDURE — 36415 COLL VENOUS BLD VENIPUNCTURE: CPT | Performed by: OBSTETRICS & GYNECOLOGY

## 2021-12-13 PROCEDURE — 80053 COMPREHEN METABOLIC PANEL: CPT | Performed by: OBSTETRICS & GYNECOLOGY

## 2021-12-13 PROCEDURE — 99999 PR PBB SHADOW E&M-EST. PATIENT-LVL II: CPT | Mod: PBBFAC,,, | Performed by: OBSTETRICS & GYNECOLOGY

## 2021-12-13 RX ORDER — NAPROXEN SODIUM 220 MG/1
81 TABLET, FILM COATED ORAL DAILY
Qty: 30 TABLET | Refills: 12 | Status: SHIPPED | OUTPATIENT
Start: 2021-12-13 | End: 2022-09-16

## 2022-01-07 ENCOUNTER — PATIENT MESSAGE (OUTPATIENT)
Dept: MATERNAL FETAL MEDICINE | Facility: CLINIC | Age: 27
End: 2022-01-07
Payer: OTHER GOVERNMENT

## 2022-01-07 DIAGNOSIS — Z36.2 ENCOUNTER FOR FOLLOW-UP ULTRASOUND OF FETAL ANATOMY: Primary | ICD-10-CM

## 2022-01-10 ENCOUNTER — ROUTINE PRENATAL (OUTPATIENT)
Dept: OBSTETRICS AND GYNECOLOGY | Facility: CLINIC | Age: 27
End: 2022-01-10
Payer: OTHER GOVERNMENT

## 2022-01-10 ENCOUNTER — PROCEDURE VISIT (OUTPATIENT)
Dept: MATERNAL FETAL MEDICINE | Facility: CLINIC | Age: 27
End: 2022-01-10
Payer: OTHER GOVERNMENT

## 2022-01-10 VITALS
WEIGHT: 208.56 LBS | DIASTOLIC BLOOD PRESSURE: 78 MMHG | BODY MASS INDEX: 33.66 KG/M2 | SYSTOLIC BLOOD PRESSURE: 142 MMHG

## 2022-01-10 DIAGNOSIS — Z23 NEED FOR DIPHTHERIA-TETANUS-PERTUSSIS (TDAP) VACCINE: Primary | ICD-10-CM

## 2022-01-10 DIAGNOSIS — Z3A.23 23 WEEKS GESTATION OF PREGNANCY: ICD-10-CM

## 2022-01-10 DIAGNOSIS — Z36.2 ENCOUNTER FOR FOLLOW-UP ULTRASOUND OF FETAL ANATOMY: ICD-10-CM

## 2022-01-10 DIAGNOSIS — Z13.1 SCREENING FOR DIABETES MELLITUS: ICD-10-CM

## 2022-01-10 PROCEDURE — 76816 US MFM PROCEDURE (VIEWPOINT): ICD-10-PCS | Mod: S$GLB,,, | Performed by: OBSTETRICS & GYNECOLOGY

## 2022-01-10 PROCEDURE — 0502F PR SUBSEQUENT PRENATAL CARE: ICD-10-PCS | Mod: S$GLB,,, | Performed by: PHYSICIAN ASSISTANT

## 2022-01-10 PROCEDURE — 76816 OB US FOLLOW-UP PER FETUS: CPT | Mod: S$GLB,,, | Performed by: OBSTETRICS & GYNECOLOGY

## 2022-01-10 PROCEDURE — 99999 PR PBB SHADOW E&M-EST. PATIENT-LVL II: CPT | Mod: PBBFAC,,, | Performed by: PHYSICIAN ASSISTANT

## 2022-01-10 PROCEDURE — 99999 PR PBB SHADOW E&M-EST. PATIENT-LVL II: ICD-10-PCS | Mod: PBBFAC,,, | Performed by: PHYSICIAN ASSISTANT

## 2022-01-10 PROCEDURE — 0502F SUBSEQUENT PRENATAL CARE: CPT | Mod: S$GLB,,, | Performed by: PHYSICIAN ASSISTANT

## 2022-01-10 NOTE — PROGRESS NOTES
Pt presents for MAYELA visit at 23w5d.    Patient with no complaints. Denies vaginal bleeding or contractions. Good FM.  labor precautions discussed with patient.     GTT/CBC at next visit.     Blood type: O POS; T&S/Rhogam not required.  Tdap at 28 weeks - ordered and scheduled.  Follow-up anatomy scan today.    RTC in 4 weeks with Dr. Garcia - 2/10.    Vitals signs, FHTs, urine dip, and PE findings documented, reviewed and available in OB flow chart.

## 2022-02-09 ENCOUNTER — PATIENT MESSAGE (OUTPATIENT)
Dept: ADMINISTRATIVE | Facility: OTHER | Age: 27
End: 2022-02-09
Payer: OTHER GOVERNMENT

## 2022-02-10 ENCOUNTER — HOSPITAL ENCOUNTER (EMERGENCY)
Facility: OTHER | Age: 27
Discharge: HOME | End: 2022-02-10
Attending: OBSTETRICS & GYNECOLOGY
Payer: OTHER GOVERNMENT

## 2022-02-10 ENCOUNTER — CLINICAL SUPPORT (OUTPATIENT)
Dept: OBSTETRICS AND GYNECOLOGY | Facility: CLINIC | Age: 27
End: 2022-02-10
Payer: OTHER GOVERNMENT

## 2022-02-10 ENCOUNTER — LAB VISIT (OUTPATIENT)
Dept: LAB | Facility: OTHER | Age: 27
End: 2022-02-10
Attending: PHYSICIAN ASSISTANT
Payer: OTHER GOVERNMENT

## 2022-02-10 VITALS
BODY MASS INDEX: 34.98 KG/M2 | WEIGHT: 216.69 LBS | DIASTOLIC BLOOD PRESSURE: 80 MMHG | SYSTOLIC BLOOD PRESSURE: 170 MMHG

## 2022-02-10 VITALS
HEART RATE: 105 BPM | SYSTOLIC BLOOD PRESSURE: 146 MMHG | RESPIRATION RATE: 18 BRPM | DIASTOLIC BLOOD PRESSURE: 84 MMHG | HEIGHT: 66 IN | WEIGHT: 216.69 LBS | TEMPERATURE: 98 F | OXYGEN SATURATION: 100 % | BODY MASS INDEX: 34.82 KG/M2

## 2022-02-10 DIAGNOSIS — Z13.1 SCREENING FOR DIABETES MELLITUS: ICD-10-CM

## 2022-02-10 DIAGNOSIS — Z3A.28 PREGNANCY WITH 28 COMPLETED WEEKS GESTATION: Primary | ICD-10-CM

## 2022-02-10 DIAGNOSIS — I10 CHRONIC HYPERTENSION: ICD-10-CM

## 2022-02-10 DIAGNOSIS — Z23 NEED FOR DIPHTHERIA-TETANUS-PERTUSSIS (TDAP) VACCINE: ICD-10-CM

## 2022-02-10 DIAGNOSIS — Z3A.28 28 WEEKS GESTATION OF PREGNANCY: Primary | ICD-10-CM

## 2022-02-10 LAB
ALBUMIN SERPL BCP-MCNC: 3.2 G/DL (ref 3.5–5.2)
ALP SERPL-CCNC: 62 U/L (ref 55–135)
ALT SERPL W/O P-5'-P-CCNC: 17 U/L (ref 10–44)
ANION GAP SERPL CALC-SCNC: 10 MMOL/L (ref 8–16)
AST SERPL-CCNC: 19 U/L (ref 10–40)
BASOPHILS # BLD AUTO: 0.02 K/UL (ref 0–0.2)
BASOPHILS # BLD AUTO: 0.02 K/UL (ref 0–0.2)
BASOPHILS NFR BLD: 0.2 % (ref 0–1.9)
BASOPHILS NFR BLD: 0.2 % (ref 0–1.9)
BILIRUB SERPL-MCNC: 0.2 MG/DL (ref 0.1–1)
BUN SERPL-MCNC: 7 MG/DL (ref 6–20)
CALCIUM SERPL-MCNC: 8.9 MG/DL (ref 8.7–10.5)
CHLORIDE SERPL-SCNC: 110 MMOL/L (ref 95–110)
CO2 SERPL-SCNC: 17 MMOL/L (ref 23–29)
CREAT SERPL-MCNC: 0.6 MG/DL (ref 0.5–1.4)
CREAT UR-MCNC: 14.9 MG/DL (ref 15–325)
DIFFERENTIAL METHOD: ABNORMAL
DIFFERENTIAL METHOD: ABNORMAL
EOSINOPHIL # BLD AUTO: 0.1 K/UL (ref 0–0.5)
EOSINOPHIL # BLD AUTO: 0.1 K/UL (ref 0–0.5)
EOSINOPHIL NFR BLD: 0.6 % (ref 0–8)
EOSINOPHIL NFR BLD: 0.6 % (ref 0–8)
ERYTHROCYTE [DISTWIDTH] IN BLOOD BY AUTOMATED COUNT: 13 % (ref 11.5–14.5)
ERYTHROCYTE [DISTWIDTH] IN BLOOD BY AUTOMATED COUNT: 13 % (ref 11.5–14.5)
EST. GFR  (AFRICAN AMERICAN): >60 ML/MIN/1.73 M^2
EST. GFR  (NON AFRICAN AMERICAN): >60 ML/MIN/1.73 M^2
GLUCOSE SERPL-MCNC: 119 MG/DL (ref 70–140)
GLUCOSE SERPL-MCNC: 96 MG/DL (ref 70–110)
HCT VFR BLD AUTO: 34.1 % (ref 37–48.5)
HCT VFR BLD AUTO: 35.1 % (ref 37–48.5)
HGB BLD-MCNC: 11.3 G/DL (ref 12–16)
HGB BLD-MCNC: 11.6 G/DL (ref 12–16)
IMM GRANULOCYTES # BLD AUTO: 0.08 K/UL (ref 0–0.04)
IMM GRANULOCYTES # BLD AUTO: 0.08 K/UL (ref 0–0.04)
IMM GRANULOCYTES NFR BLD AUTO: 0.7 % (ref 0–0.5)
IMM GRANULOCYTES NFR BLD AUTO: 0.7 % (ref 0–0.5)
LYMPHOCYTES # BLD AUTO: 2.1 K/UL (ref 1–4.8)
LYMPHOCYTES # BLD AUTO: 2.3 K/UL (ref 1–4.8)
LYMPHOCYTES NFR BLD: 18.8 % (ref 18–48)
LYMPHOCYTES NFR BLD: 18.8 % (ref 18–48)
MCH RBC QN AUTO: 30.4 PG (ref 27–31)
MCH RBC QN AUTO: 30.5 PG (ref 27–31)
MCHC RBC AUTO-ENTMCNC: 33 G/DL (ref 32–36)
MCHC RBC AUTO-ENTMCNC: 33.1 G/DL (ref 32–36)
MCV RBC AUTO: 92 FL (ref 82–98)
MCV RBC AUTO: 92 FL (ref 82–98)
MONOCYTES # BLD AUTO: 0.8 K/UL (ref 0.3–1)
MONOCYTES # BLD AUTO: 1 K/UL (ref 0.3–1)
MONOCYTES NFR BLD: 7.6 % (ref 4–15)
MONOCYTES NFR BLD: 8.3 % (ref 4–15)
NEUTROPHILS # BLD AUTO: 7.9 K/UL (ref 1.8–7.7)
NEUTROPHILS # BLD AUTO: 8.6 K/UL (ref 1.8–7.7)
NEUTROPHILS NFR BLD: 71.4 % (ref 38–73)
NEUTROPHILS NFR BLD: 72.1 % (ref 38–73)
NRBC BLD-RTO: 0 /100 WBC
NRBC BLD-RTO: 0 /100 WBC
PLATELET # BLD AUTO: 240 K/UL (ref 150–450)
PLATELET # BLD AUTO: 257 K/UL (ref 150–450)
PMV BLD AUTO: 10.2 FL (ref 9.2–12.9)
PMV BLD AUTO: 10.5 FL (ref 9.2–12.9)
POTASSIUM SERPL-SCNC: 4 MMOL/L (ref 3.5–5.1)
PROT SERPL-MCNC: 6.9 G/DL (ref 6–8.4)
PROT UR-MCNC: <7 MG/DL (ref 0–15)
PROT/CREAT UR: ABNORMAL MG/G{CREAT} (ref 0–0.2)
RBC # BLD AUTO: 3.71 M/UL (ref 4–5.4)
RBC # BLD AUTO: 3.82 M/UL (ref 4–5.4)
SODIUM SERPL-SCNC: 137 MMOL/L (ref 136–145)
WBC # BLD AUTO: 10.96 K/UL (ref 3.9–12.7)
WBC # BLD AUTO: 11.97 K/UL (ref 3.9–12.7)

## 2022-02-10 PROCEDURE — 25000003 PHARM REV CODE 250: Performed by: OBSTETRICS & GYNECOLOGY

## 2022-02-10 PROCEDURE — 99999 PR PBB SHADOW E&M-EST. PATIENT-LVL I: CPT | Mod: PBBFAC,,,

## 2022-02-10 PROCEDURE — 99999 PR PBB SHADOW E&M-EST. PATIENT-LVL II: ICD-10-PCS | Mod: PBBFAC,,, | Performed by: OBSTETRICS & GYNECOLOGY

## 2022-02-10 PROCEDURE — 84156 ASSAY OF PROTEIN URINE: CPT | Performed by: STUDENT IN AN ORGANIZED HEALTH CARE EDUCATION/TRAINING PROGRAM

## 2022-02-10 PROCEDURE — 99284 PR EMERGENCY DEPT VISIT,LEVEL IV: ICD-10-PCS | Mod: 25,,, | Performed by: OBSTETRICS & GYNECOLOGY

## 2022-02-10 PROCEDURE — 90471 IMMUNIZATION ADMIN: CPT | Mod: S$GLB,,, | Performed by: PHYSICIAN ASSISTANT

## 2022-02-10 PROCEDURE — 36415 COLL VENOUS BLD VENIPUNCTURE: CPT | Performed by: PHYSICIAN ASSISTANT

## 2022-02-10 PROCEDURE — 85025 COMPLETE CBC W/AUTO DIFF WBC: CPT | Performed by: STUDENT IN AN ORGANIZED HEALTH CARE EDUCATION/TRAINING PROGRAM

## 2022-02-10 PROCEDURE — 0502F SUBSEQUENT PRENATAL CARE: CPT | Mod: S$GLB,,, | Performed by: OBSTETRICS & GYNECOLOGY

## 2022-02-10 PROCEDURE — 90471 TDAP VACCINE GREATER THAN OR EQUAL TO 7YO IM: ICD-10-PCS | Mod: S$GLB,,, | Performed by: PHYSICIAN ASSISTANT

## 2022-02-10 PROCEDURE — 82950 GLUCOSE TEST: CPT | Performed by: PHYSICIAN ASSISTANT

## 2022-02-10 PROCEDURE — 59025 FETAL NON-STRESS TEST: CPT

## 2022-02-10 PROCEDURE — 80053 COMPREHEN METABOLIC PANEL: CPT | Performed by: STUDENT IN AN ORGANIZED HEALTH CARE EDUCATION/TRAINING PROGRAM

## 2022-02-10 PROCEDURE — 90715 TDAP VACCINE GREATER THAN OR EQUAL TO 7YO IM: ICD-10-PCS | Mod: S$GLB,,, | Performed by: PHYSICIAN ASSISTANT

## 2022-02-10 PROCEDURE — 99284 EMERGENCY DEPT VISIT MOD MDM: CPT | Mod: 25,,, | Performed by: OBSTETRICS & GYNECOLOGY

## 2022-02-10 PROCEDURE — 59025 PR FETAL 2N-STRESS TEST: ICD-10-PCS | Mod: 26,,, | Performed by: OBSTETRICS & GYNECOLOGY

## 2022-02-10 PROCEDURE — 0502F PR SUBSEQUENT PRENATAL CARE: ICD-10-PCS | Mod: S$GLB,,, | Performed by: OBSTETRICS & GYNECOLOGY

## 2022-02-10 PROCEDURE — 99999 PR PBB SHADOW E&M-EST. PATIENT-LVL I: ICD-10-PCS | Mod: PBBFAC,,,

## 2022-02-10 PROCEDURE — 59025 FETAL NON-STRESS TEST: CPT | Mod: 26,,, | Performed by: OBSTETRICS & GYNECOLOGY

## 2022-02-10 PROCEDURE — 90715 TDAP VACCINE 7 YRS/> IM: CPT | Mod: S$GLB,,, | Performed by: PHYSICIAN ASSISTANT

## 2022-02-10 PROCEDURE — 99999 PR PBB SHADOW E&M-EST. PATIENT-LVL II: CPT | Mod: PBBFAC,,, | Performed by: OBSTETRICS & GYNECOLOGY

## 2022-02-10 PROCEDURE — 85025 COMPLETE CBC W/AUTO DIFF WBC: CPT | Mod: 91 | Performed by: PHYSICIAN ASSISTANT

## 2022-02-10 PROCEDURE — 99284 EMERGENCY DEPT VISIT MOD MDM: CPT | Mod: 25

## 2022-02-10 RX ORDER — NIFEDIPINE 30 MG/1
30 TABLET, EXTENDED RELEASE ORAL DAILY
Qty: 30 TABLET | Refills: 11 | Status: SHIPPED | OUTPATIENT
Start: 2022-02-10 | End: 2022-02-17

## 2022-02-10 RX ORDER — NIFEDIPINE 30 MG/1
30 TABLET, EXTENDED RELEASE ORAL DAILY
Status: DISCONTINUED | OUTPATIENT
Start: 2022-02-10 | End: 2022-02-10 | Stop reason: HOSPADM

## 2022-02-10 RX ADMIN — NIFEDIPINE 30 MG: 30 TABLET, FILM COATED, EXTENDED RELEASE ORAL at 04:02

## 2022-02-10 NOTE — ED PROVIDER NOTES
Encounter Date: 2/10/2022       History     Chief Complaint   Patient presents with    Hypertension     Eve Salazar is a 26 y.o.  at 28w1d gestation presenting from clinic for elevated BP. BP was 170/80 and patient was sent to [facility] for further evaluation. She reports feeling well without headaches, vision changes, CP, SOB, RUQ pain, LE edema. Denies contractions, vaginal bleeding, loss of fluid. This IUP is complicated by cHTN.         Review of patient's allergies indicates:  No Known Allergies  Past Medical History:   Diagnosis Date    HSV (herpes simplex virus) anogenital infection      No past surgical history on file.  Family History   Problem Relation Age of Onset    Breast cancer Neg Hx     Cancer Neg Hx     Colon cancer Neg Hx     Diabetes Neg Hx     Eclampsia Neg Hx     Hypertension Neg Hx     Miscarriages / Stillbirths Neg Hx     Ovarian cancer Neg Hx      labor Neg Hx     Stroke Neg Hx      Social History     Tobacco Use    Smoking status: Former Smoker    Smokeless tobacco: Never Used   Substance Use Topics    Alcohol use: Not Currently     Alcohol/week: 2.0 standard drinks     Types: 2 Glasses of wine per week    Drug use: No     Review of Systems   Constitutional: Negative for chills, fatigue and fever.   HENT: Negative for congestion and sore throat.    Eyes: Negative for visual disturbance.   Respiratory: Negative for cough and shortness of breath.    Cardiovascular: Negative for chest pain.   Gastrointestinal: Negative for abdominal pain, constipation, diarrhea, nausea and vomiting.   Genitourinary: Negative for dysuria, frequency, hematuria, vaginal bleeding, vaginal discharge and vaginal pain.   Musculoskeletal: Negative for back pain.   Skin: Negative for rash.   Neurological: Negative for light-headedness and headaches.   Hematological: Does not bruise/bleed easily.       Physical Exam     Initial Vitals   BP Pulse Resp Temp SpO2   02/10/22 1445 02/10/22 1442  02/10/22 1422 02/10/22 1435 02/10/22 1442   (!) 144/87 (!) 115 18 98.3 °F (36.8 °C) 99 %      MAP       --                Physical Exam    Constitutional: She appears well-developed and well-nourished. No distress.   HENT:   Head: Normocephalic and atraumatic.   Eyes: EOM are normal.   Neck: Neck supple.   Normal range of motion.  Cardiovascular: Normal rate.   Pulmonary/Chest: No respiratory distress.   Abdominal: Abdomen is soft. There is no abdominal tenderness.   Gravid There is no rebound and no guarding.   Genitourinary:    Uterus normal.     Musculoskeletal:         General: No tenderness or edema. Normal range of motion.      Cervical back: Normal range of motion and neck supple.     Neurological: She is alert and oriented to person, place, and time. She has normal reflexes.   Skin: Skin is warm and dry.   Psychiatric: She has a normal mood and affect. Thought content normal.         ED Course   Obtain Fetal nonstress test (NST)    Date/Time: 2/10/2022 3:38 PM  Performed by: Radha Harrell MD  Authorized by: Radha Harrell MD     Nonstress Test:     Variability:  6-25 BPM    Decelerations:  None    Accelerations:  10 bpm    Acoustic Stimulator: No      Baseline:  135    Uterine Irritability: No      Contractions:  Not present  Biophysical Profile:     Nonstress Test Interpretation: reactive      Overall Impression:  Reassuring  Post-procedure:     Patient tolerance:  Patient tolerated the procedure well with no immediate complications      Labs Reviewed   COMPREHENSIVE METABOLIC PANEL - Abnormal; Notable for the following components:       Result Value    CO2 17 (*)     Albumin 3.2 (*)     All other components within normal limits   CBC W/ AUTO DIFFERENTIAL - Abnormal; Notable for the following components:    RBC 3.71 (*)     Hemoglobin 11.3 (*)     Hematocrit 34.1 (*)     Immature Granulocytes 0.7 (*)     Gran # (ANC) 8.6 (*)     Immature Grans (Abs) 0.08 (*)     All other components within normal limits    PROTEIN / CREATININE RATIO, URINE - Abnormal; Notable for the following components:    Creatinine, Urine 14.9 (*)     All other components within normal limits    Narrative:     Specimen Source->Urine          Imaging Results    None          Medications - No data to display  Medical Decision Making:   ED Management:  - BP mild range in [facility]  - Asymptomatic. PE unremarkable.  - NST RR  - TOCO: not amaya    - CBC 11/11/34/240  - Cr 0.6, AST/ALT 19/17  - P:C TLTC  - Patient stable for discharge. Will discharge with Procardia 30mg XL PO daily. 1st dose given in [facility]; patient advised to obtain Rx and take that dose tomorrow.   - Patient will follow primary OB closely. Will send message to obtain BP cuff via connected MOM.  - ED precautions given. PreE precautions given.   - She voiced understanding and agreement with the plan     Other:   I have discussed this case with another health care provider.            Attending Attestation:   Physician Attestation Statement for Resident:  As the supervising MD   Physician Attestation Statement: I have personally seen and examined this patient.   I agree with the above history. -:   As the supervising MD I agree with the above PE.    As the supervising MD I agree with the above treatment, course, plan, and disposition.  I was personally present during the critical portions of the procedure(s) performed by the resident and was immediately available in the ED to provide services and assistance as needed during the entire procedure.  I have reviewed and agree with the residents interpretation of the following: lab data.  I have reviewed the following: old records at this facility.                          Clinical Impression:   Final diagnoses:  [Z3A.28] 28 weeks gestation of pregnancy (Primary)  [I10] Chronic hypertension          ED Disposition Condition    Discharge Stable        ED Prescriptions     Medication Sig Dispense Start Date End Date Auth. Provider     NIFEdipine (PROCARDIA-XL) 30 MG (OSM) 24 hr tablet Take 1 tablet (30 mg total) by mouth once daily. 30 tablet 2/10/2022 2/10/2023 Radha Harrell MD        Follow-up Information    None         Radha Harrell MD  OBN, PGY-1     Nohemi Alejo MD  02/10/22 7028

## 2022-02-10 NOTE — DISCHARGE INSTRUCTIONS
Call clinic [phone] or L & D after hours at [phone] for vaginal bleeding, leakage of fluids, contractions 4-5 in one hour, decreased fetal movements ( 10 kicks in 2 hours), headache not relieved by Tylenol, blurry vision, or temp of 100.4 or greater.  Begin doing fetal kick counts, at least 10 movements in 2 hours starting at 28 weeks gestation.  Keep next clinic appointment

## 2022-02-10 NOTE — ED NOTES
Pt from clinic with c/o elevated bps.  Reports +fm   Denies vb, lof, ctxs HA, vision changes or epigastric pain

## 2022-02-11 NOTE — PROGRESS NOTES
Here for TDAP injection. Patient without complaint of pain at this time, Injection given. Tolerated well. No pain noted after injection.  Advised to wait in lobby 15 minutes and report any adverse reactions.     Site: Right Deltoid

## 2022-02-17 ENCOUNTER — HOSPITAL ENCOUNTER (OUTPATIENT)
Dept: PERINATAL CARE | Facility: OTHER | Age: 27
Discharge: HOME | End: 2022-02-17
Attending: OBSTETRICS & GYNECOLOGY
Payer: OTHER GOVERNMENT

## 2022-02-17 ENCOUNTER — ROUTINE PRENATAL (OUTPATIENT)
Dept: OBSTETRICS AND GYNECOLOGY | Facility: CLINIC | Age: 27
End: 2022-02-17
Payer: OTHER GOVERNMENT

## 2022-02-17 VITALS
BODY MASS INDEX: 34.87 KG/M2 | WEIGHT: 216.06 LBS | SYSTOLIC BLOOD PRESSURE: 200 MMHG | DIASTOLIC BLOOD PRESSURE: 90 MMHG

## 2022-02-17 DIAGNOSIS — Z3A.29 PREGNANCY WITH 29 COMPLETED WEEKS GESTATION: Primary | ICD-10-CM

## 2022-02-17 DIAGNOSIS — I10 HTN (HYPERTENSION), BENIGN: ICD-10-CM

## 2022-02-17 PROBLEM — D64.9 ANEMIA: Status: ACTIVE | Noted: 2022-02-17

## 2022-02-17 PROCEDURE — 59025 PRENATAL TESTING - NST/AFI: ICD-10-PCS | Mod: 26,,, | Performed by: OBSTETRICS & GYNECOLOGY

## 2022-02-17 PROCEDURE — 0502F PR SUBSEQUENT PRENATAL CARE: ICD-10-PCS | Mod: S$GLB,,, | Performed by: OBSTETRICS & GYNECOLOGY

## 2022-02-17 PROCEDURE — 76815 PRENATAL TESTING - NST/AFI: ICD-10-PCS | Mod: 26,,, | Performed by: OBSTETRICS & GYNECOLOGY

## 2022-02-17 PROCEDURE — 0502F SUBSEQUENT PRENATAL CARE: CPT | Mod: S$GLB,,, | Performed by: OBSTETRICS & GYNECOLOGY

## 2022-02-17 PROCEDURE — 59025 FETAL NON-STRESS TEST: CPT | Mod: 26,,, | Performed by: OBSTETRICS & GYNECOLOGY

## 2022-02-17 PROCEDURE — 76815 OB US LIMITED FETUS(S): CPT

## 2022-02-17 PROCEDURE — 59025 FETAL NON-STRESS TEST: CPT

## 2022-02-17 PROCEDURE — 76815 OB US LIMITED FETUS(S): CPT | Mod: 26,,, | Performed by: OBSTETRICS & GYNECOLOGY

## 2022-02-17 PROCEDURE — 99999 PR PBB SHADOW E&M-EST. PATIENT-LVL II: CPT | Mod: PBBFAC,,, | Performed by: OBSTETRICS & GYNECOLOGY

## 2022-02-17 PROCEDURE — 99999 PR PBB SHADOW E&M-EST. PATIENT-LVL II: ICD-10-PCS | Mod: PBBFAC,,, | Performed by: OBSTETRICS & GYNECOLOGY

## 2022-02-17 RX ORDER — NIFEDIPINE 30 MG/1
30 TABLET, EXTENDED RELEASE ORAL 2 TIMES DAILY
Qty: 60 TABLET | Refills: 3 | Status: SHIPPED | OUTPATIENT
Start: 2022-02-17 | End: 2022-04-05 | Stop reason: SDUPTHER

## 2022-02-17 NOTE — PROGRESS NOTES
REPEAT MI=443/90  NO PROBLEMS. NO S SX PRE E.BP AT HOME/WORK 130/90.  RTC 1 WEEK NP / PROCARDIA 30 XL BID / PREC.

## 2022-02-18 ENCOUNTER — TELEPHONE (OUTPATIENT)
Dept: OBSTETRICS AND GYNECOLOGY | Facility: CLINIC | Age: 27
End: 2022-02-18
Payer: OTHER GOVERNMENT

## 2022-02-18 DIAGNOSIS — Z3A.29 PREGNANCY WITH 29 COMPLETED WEEKS GESTATION: ICD-10-CM

## 2022-02-18 DIAGNOSIS — I10 HTN (HYPERTENSION), BENIGN: Primary | ICD-10-CM

## 2022-02-18 NOTE — TELEPHONE ENCOUNTER
----- Message from Martha Toney MA sent at 2/18/2022  3:10 PM CST -----    ----- Message -----  From: Taylor Black  Sent: 2/18/2022   9:53 AM CST  To: Jose COVARRUBIAS Jr Staff    Name of Who is Calling: ELMO BERGER          What is the request in detail: The patient is calling to speak to the nurse in regards to a few questions. Please advise          Can the clinic reply by [facility]: yes         What Number to Call Back if not in [facility]: [phone]

## 2022-02-21 ENCOUNTER — TELEPHONE (OUTPATIENT)
Dept: OBSTETRICS AND GYNECOLOGY | Facility: CLINIC | Age: 27
End: 2022-02-21
Payer: OTHER GOVERNMENT

## 2022-02-21 NOTE — TELEPHONE ENCOUNTER
----- Message from Marley Briggs LPN sent at 2/18/2022  4:07 PM CST -----    ----- Message -----  From: Martha Toney MA  Sent: 2/18/2022   3:10 PM CST  To: Marley Briggs LPN      ----- Message -----  From: Taylor Black  Sent: 2/18/2022   9:53 AM CST  To: Jose COVARRUBIAS Jr Staff    Name of Who is Calling: ELMO BERGER          What is the request in detail: The patient is calling to speak to the nurse in regards to a few questions. Please advise          Can the clinic reply by [facility]: yes         What Number to Call Back if not in [facility]: [phone]

## 2022-02-28 ENCOUNTER — ROUTINE PRENATAL (OUTPATIENT)
Dept: OBSTETRICS AND GYNECOLOGY | Facility: CLINIC | Age: 27
End: 2022-02-28
Payer: OTHER GOVERNMENT

## 2022-02-28 ENCOUNTER — LAB VISIT (OUTPATIENT)
Dept: LAB | Facility: OTHER | Age: 27
End: 2022-02-28
Attending: PHYSICIAN ASSISTANT
Payer: OTHER GOVERNMENT

## 2022-02-28 VITALS
SYSTOLIC BLOOD PRESSURE: 150 MMHG | WEIGHT: 220.44 LBS | DIASTOLIC BLOOD PRESSURE: 80 MMHG | BODY MASS INDEX: 35.58 KG/M2

## 2022-02-28 DIAGNOSIS — O16.3 HYPERTENSION DURING PREGNANCY IN THIRD TRIMESTER, UNSPECIFIED HYPERTENSION IN PREGNANCY TYPE: ICD-10-CM

## 2022-02-28 DIAGNOSIS — Z3A.30 30 WEEKS GESTATION OF PREGNANCY: Primary | ICD-10-CM

## 2022-02-28 LAB
ALBUMIN SERPL BCP-MCNC: 3.2 G/DL (ref 3.5–5.2)
ALP SERPL-CCNC: 74 U/L (ref 55–135)
ALT SERPL W/O P-5'-P-CCNC: 29 U/L (ref 10–44)
ANION GAP SERPL CALC-SCNC: 9 MMOL/L (ref 8–16)
AST SERPL-CCNC: 33 U/L (ref 10–40)
BASOPHILS # BLD AUTO: 0.03 K/UL (ref 0–0.2)
BASOPHILS NFR BLD: 0.2 % (ref 0–1.9)
BILIRUB SERPL-MCNC: 0.3 MG/DL (ref 0.1–1)
BUN SERPL-MCNC: 6 MG/DL (ref 6–20)
CALCIUM SERPL-MCNC: 9.8 MG/DL (ref 8.7–10.5)
CHLORIDE SERPL-SCNC: 106 MMOL/L (ref 95–110)
CO2 SERPL-SCNC: 21 MMOL/L (ref 23–29)
CREAT SERPL-MCNC: 0.6 MG/DL (ref 0.5–1.4)
CREAT UR-MCNC: 43 MG/DL (ref 15–325)
DIFFERENTIAL METHOD: ABNORMAL
EOSINOPHIL # BLD AUTO: 0.1 K/UL (ref 0–0.5)
EOSINOPHIL NFR BLD: 0.7 % (ref 0–8)
ERYTHROCYTE [DISTWIDTH] IN BLOOD BY AUTOMATED COUNT: 13.3 % (ref 11.5–14.5)
EST. GFR  (AFRICAN AMERICAN): >60 ML/MIN/1.73 M^2
EST. GFR  (NON AFRICAN AMERICAN): >60 ML/MIN/1.73 M^2
GLUCOSE SERPL-MCNC: 71 MG/DL (ref 70–110)
HCT VFR BLD AUTO: 36.9 % (ref 37–48.5)
HGB BLD-MCNC: 12.5 G/DL (ref 12–16)
IMM GRANULOCYTES # BLD AUTO: 0.11 K/UL (ref 0–0.04)
IMM GRANULOCYTES NFR BLD AUTO: 0.9 % (ref 0–0.5)
LDH SERPL L TO P-CCNC: 162 U/L (ref 110–260)
LYMPHOCYTES # BLD AUTO: 2.3 K/UL (ref 1–4.8)
LYMPHOCYTES NFR BLD: 18.1 % (ref 18–48)
MCH RBC QN AUTO: 31.2 PG (ref 27–31)
MCHC RBC AUTO-ENTMCNC: 33.9 G/DL (ref 32–36)
MCV RBC AUTO: 92 FL (ref 82–98)
MONOCYTES # BLD AUTO: 1 K/UL (ref 0.3–1)
MONOCYTES NFR BLD: 8.2 % (ref 4–15)
NEUTROPHILS # BLD AUTO: 9 K/UL (ref 1.8–7.7)
NEUTROPHILS NFR BLD: 71.9 % (ref 38–73)
NRBC BLD-RTO: 0 /100 WBC
PLATELET # BLD AUTO: 252 K/UL (ref 150–450)
PMV BLD AUTO: 10.3 FL (ref 9.2–12.9)
POTASSIUM SERPL-SCNC: 4.3 MMOL/L (ref 3.5–5.1)
PROT SERPL-MCNC: 7 G/DL (ref 6–8.4)
PROT UR-MCNC: <7 MG/DL (ref 0–15)
PROT/CREAT UR: NORMAL MG/G{CREAT} (ref 0–0.2)
RBC # BLD AUTO: 4.01 M/UL (ref 4–5.4)
SODIUM SERPL-SCNC: 136 MMOL/L (ref 136–145)
WBC # BLD AUTO: 12.58 K/UL (ref 3.9–12.7)

## 2022-02-28 PROCEDURE — 83615 LACTATE (LD) (LDH) ENZYME: CPT | Performed by: PHYSICIAN ASSISTANT

## 2022-02-28 PROCEDURE — 85025 COMPLETE CBC W/AUTO DIFF WBC: CPT | Performed by: PHYSICIAN ASSISTANT

## 2022-02-28 PROCEDURE — 99999 PR PBB SHADOW E&M-EST. PATIENT-LVL III: ICD-10-PCS | Mod: PBBFAC,,, | Performed by: PHYSICIAN ASSISTANT

## 2022-02-28 PROCEDURE — 0502F SUBSEQUENT PRENATAL CARE: CPT | Mod: S$GLB,,, | Performed by: PHYSICIAN ASSISTANT

## 2022-02-28 PROCEDURE — 99999 PR PBB SHADOW E&M-EST. PATIENT-LVL III: CPT | Mod: PBBFAC,,, | Performed by: PHYSICIAN ASSISTANT

## 2022-02-28 PROCEDURE — 36415 COLL VENOUS BLD VENIPUNCTURE: CPT | Performed by: PHYSICIAN ASSISTANT

## 2022-02-28 PROCEDURE — 0502F PR SUBSEQUENT PRENATAL CARE: ICD-10-PCS | Mod: S$GLB,,, | Performed by: PHYSICIAN ASSISTANT

## 2022-02-28 PROCEDURE — 80053 COMPREHEN METABOLIC PANEL: CPT | Performed by: PHYSICIAN ASSISTANT

## 2022-02-28 PROCEDURE — 84156 ASSAY OF PROTEIN URINE: CPT | Performed by: PHYSICIAN ASSISTANT

## 2022-02-28 NOTE — PROGRESS NOTES
Pt presents for MAYELA visit at 30w5d.    Bp elevated at 154/72. Repeat 150/80. Pt denies symptoms. Discussed with Dr. Vasques - Tushar labs ordered today. ER precautions discussed. Pt currently taking procardia and monitoring BP at home.     Patient with no complaints. Denies vaginal bleeding or contractions. Good FM.    Tdap completed.   Growth scan completed. Next US scheduled for 3/9.    Vitals signs, FHTs, urine dip, and PE findings documented, reviewed and available in OB flow chart.     MAYELA in 2 weeks with Dr. Vasques.

## 2022-03-08 ENCOUNTER — PATIENT MESSAGE (OUTPATIENT)
Dept: MATERNAL FETAL MEDICINE | Facility: CLINIC | Age: 27
End: 2022-03-08
Payer: OTHER GOVERNMENT

## 2022-03-09 ENCOUNTER — PATIENT MESSAGE (OUTPATIENT)
Dept: ADMINISTRATIVE | Facility: OTHER | Age: 27
End: 2022-03-09
Payer: OTHER GOVERNMENT

## 2022-03-09 ENCOUNTER — PROCEDURE VISIT (OUTPATIENT)
Dept: MATERNAL FETAL MEDICINE | Facility: CLINIC | Age: 27
End: 2022-03-09
Payer: OTHER GOVERNMENT

## 2022-03-09 DIAGNOSIS — I10 HTN (HYPERTENSION), BENIGN: ICD-10-CM

## 2022-03-09 PROCEDURE — 76819 FETAL BIOPHYS PROFIL W/O NST: CPT | Mod: S$GLB,,, | Performed by: OBSTETRICS & GYNECOLOGY

## 2022-03-09 PROCEDURE — 76816 OB US FOLLOW-UP PER FETUS: CPT | Mod: S$GLB,,, | Performed by: OBSTETRICS & GYNECOLOGY

## 2022-03-09 PROCEDURE — 76819 US MFM PROCEDURE (VIEWPOINT): ICD-10-PCS | Mod: S$GLB,,, | Performed by: OBSTETRICS & GYNECOLOGY

## 2022-03-09 PROCEDURE — 76816 US MFM PROCEDURE (VIEWPOINT): ICD-10-PCS | Mod: S$GLB,,, | Performed by: OBSTETRICS & GYNECOLOGY

## 2022-03-11 ENCOUNTER — INITIAL PRENATAL (OUTPATIENT)
Dept: OBSTETRICS AND GYNECOLOGY | Facility: CLINIC | Age: 27
End: 2022-03-11
Payer: OTHER GOVERNMENT

## 2022-03-11 VITALS — SYSTOLIC BLOOD PRESSURE: 148 MMHG | BODY MASS INDEX: 35.35 KG/M2 | DIASTOLIC BLOOD PRESSURE: 84 MMHG | WEIGHT: 219 LBS

## 2022-03-11 DIAGNOSIS — I10 HTN (HYPERTENSION), BENIGN: ICD-10-CM

## 2022-03-11 DIAGNOSIS — Z3A.29 PREGNANCY WITH 29 COMPLETED WEEKS GESTATION: ICD-10-CM

## 2022-03-11 DIAGNOSIS — Z3A.32 32 WEEKS GESTATION OF PREGNANCY: ICD-10-CM

## 2022-03-11 DIAGNOSIS — O16.3 HYPERTENSION DURING PREGNANCY IN THIRD TRIMESTER, UNSPECIFIED HYPERTENSION IN PREGNANCY TYPE: Primary | ICD-10-CM

## 2022-03-11 PROCEDURE — 90686 FLU VACCINE (QUAD) GREATER THAN OR EQUAL TO 3YO PRESERVATIVE FREE IM: ICD-10-PCS | Mod: S$GLB,,, | Performed by: OBSTETRICS & GYNECOLOGY

## 2022-03-11 PROCEDURE — 76819 US OB/GYN EXTENDED PROCEDURE (VIEWPOINT): ICD-10-PCS | Mod: S$GLB,,, | Performed by: OBSTETRICS & GYNECOLOGY

## 2022-03-11 PROCEDURE — 99999 PR PBB SHADOW E&M-EST. PATIENT-LVL III: CPT | Mod: PBBFAC,,, | Performed by: OBSTETRICS & GYNECOLOGY

## 2022-03-11 PROCEDURE — 99999 PR PBB SHADOW E&M-EST. PATIENT-LVL III: ICD-10-PCS | Mod: PBBFAC,,, | Performed by: OBSTETRICS & GYNECOLOGY

## 2022-03-11 PROCEDURE — 90471 IMMUNIZATION ADMIN: CPT | Mod: S$GLB,,, | Performed by: OBSTETRICS & GYNECOLOGY

## 2022-03-11 PROCEDURE — 76819 FETAL BIOPHYS PROFIL W/O NST: CPT | Mod: S$GLB,,, | Performed by: OBSTETRICS & GYNECOLOGY

## 2022-03-11 PROCEDURE — 0502F SUBSEQUENT PRENATAL CARE: CPT | Mod: S$GLB,,, | Performed by: OBSTETRICS & GYNECOLOGY

## 2022-03-11 PROCEDURE — 0502F PR SUBSEQUENT PRENATAL CARE: ICD-10-PCS | Mod: S$GLB,,, | Performed by: OBSTETRICS & GYNECOLOGY

## 2022-03-11 PROCEDURE — 90686 IIV4 VACC NO PRSV 0.5 ML IM: CPT | Mod: S$GLB,,, | Performed by: OBSTETRICS & GYNECOLOGY

## 2022-03-11 PROCEDURE — 90471 FLU VACCINE (QUAD) GREATER THAN OR EQUAL TO 3YO PRESERVATIVE FREE IM: ICD-10-PCS | Mod: S$GLB,,, | Performed by: OBSTETRICS & GYNECOLOGY

## 2022-03-11 NOTE — PROGRESS NOTES
32w2d presents as a late transfer of prenatal care - previously seen in Dr. Garcia's office.   She is without complaints today.   Discussed dx of gHTN and how pattern appears more c/w cHTN. She's currently taking Procardia XL 30 mg BID.   BPP today 8/8.    RTC in 1 week for routine PNC with BPP.   Discuss PPC at next visit.

## 2022-03-11 NOTE — PROGRESS NOTES
Urine looks good. Increased FM.  Denies pain or pressure.  C/O constant heartburn, takes Tums to help resolve.  BP slightly elevated today.

## 2022-03-16 ENCOUNTER — TELEPHONE (OUTPATIENT)
Dept: OBSTETRICS AND GYNECOLOGY | Facility: CLINIC | Age: 27
End: 2022-03-16

## 2022-03-16 ENCOUNTER — PROCEDURE VISIT (OUTPATIENT)
Dept: OBSTETRICS AND GYNECOLOGY | Facility: CLINIC | Age: 27
End: 2022-03-16
Payer: OTHER GOVERNMENT

## 2022-03-16 VITALS
DIASTOLIC BLOOD PRESSURE: 84 MMHG | BODY MASS INDEX: 35.58 KG/M2 | SYSTOLIC BLOOD PRESSURE: 144 MMHG | WEIGHT: 220.44 LBS

## 2022-03-16 DIAGNOSIS — O99.210 MATERNAL OBESITY AFFECTING PREGNANCY, ANTEPARTUM: ICD-10-CM

## 2022-03-16 DIAGNOSIS — Z3A.29 PREGNANCY WITH 29 COMPLETED WEEKS GESTATION: ICD-10-CM

## 2022-03-16 DIAGNOSIS — O16.3 HYPERTENSION DURING PREGNANCY IN THIRD TRIMESTER, UNSPECIFIED HYPERTENSION IN PREGNANCY TYPE: Primary | ICD-10-CM

## 2022-03-16 DIAGNOSIS — I10 HTN (HYPERTENSION), BENIGN: ICD-10-CM

## 2022-03-16 DIAGNOSIS — Z3A.33 33 WEEKS GESTATION OF PREGNANCY: ICD-10-CM

## 2022-03-16 PROCEDURE — 76819 US OB/GYN EXTENDED PROCEDURE (VIEWPOINT): ICD-10-PCS | Mod: S$GLB,,, | Performed by: OBSTETRICS & GYNECOLOGY

## 2022-03-16 PROCEDURE — 0502F SUBSEQUENT PRENATAL CARE: CPT | Mod: S$GLB,,, | Performed by: OBSTETRICS & GYNECOLOGY

## 2022-03-16 PROCEDURE — 99999 PR PBB SHADOW E&M-EST. PATIENT-LVL III: ICD-10-PCS | Mod: PBBFAC,,, | Performed by: OBSTETRICS & GYNECOLOGY

## 2022-03-16 PROCEDURE — 0502F PR SUBSEQUENT PRENATAL CARE: ICD-10-PCS | Mod: S$GLB,,, | Performed by: OBSTETRICS & GYNECOLOGY

## 2022-03-16 PROCEDURE — 76819 FETAL BIOPHYS PROFIL W/O NST: CPT | Mod: S$GLB,,, | Performed by: OBSTETRICS & GYNECOLOGY

## 2022-03-16 PROCEDURE — 99999 PR PBB SHADOW E&M-EST. PATIENT-LVL III: CPT | Mod: PBBFAC,,, | Performed by: OBSTETRICS & GYNECOLOGY

## 2022-03-16 NOTE — TELEPHONE ENCOUNTER
----- Message from Laura Vasques MD sent at 3/16/2022  1:39 PM CDT -----  Can we please request an IOL for Eve for 4/21 at 5 pm?   She will be 38w2d. Indication is cHTN.   Thank you!

## 2022-03-16 NOTE — PROGRESS NOTES
33w0d without major complaints.   BPP 8/8.   No HAs, vision changes, CP, SOB.   Discussed PPC with patient and she would like pOCPs.   RTC in 1 week for routine PNC with BPP.   Delivery timing discussed with patient today - due to cHTN on one med without co-morbidities, current recs are for delivery between 38w0d and 38w6d.

## 2022-03-25 ENCOUNTER — PROCEDURE VISIT (OUTPATIENT)
Dept: OBSTETRICS AND GYNECOLOGY | Facility: CLINIC | Age: 27
End: 2022-03-25
Payer: OTHER GOVERNMENT

## 2022-03-25 VITALS
DIASTOLIC BLOOD PRESSURE: 82 MMHG | WEIGHT: 220.25 LBS | BODY MASS INDEX: 35.55 KG/M2 | SYSTOLIC BLOOD PRESSURE: 138 MMHG

## 2022-03-25 DIAGNOSIS — Z3A.29 PREGNANCY WITH 29 COMPLETED WEEKS GESTATION: ICD-10-CM

## 2022-03-25 DIAGNOSIS — I10 HTN (HYPERTENSION), BENIGN: ICD-10-CM

## 2022-03-25 DIAGNOSIS — Z3A.34 34 WEEKS GESTATION OF PREGNANCY: Primary | ICD-10-CM

## 2022-03-25 PROCEDURE — 0502F PR SUBSEQUENT PRENATAL CARE: ICD-10-PCS | Mod: S$GLB,,, | Performed by: NURSE PRACTITIONER

## 2022-03-25 PROCEDURE — 99999 PR PBB SHADOW E&M-EST. PATIENT-LVL II: CPT | Mod: PBBFAC,,, | Performed by: NURSE PRACTITIONER

## 2022-03-25 PROCEDURE — 76819 FETAL BIOPHYS PROFIL W/O NST: CPT | Mod: S$GLB,,, | Performed by: OBSTETRICS & GYNECOLOGY

## 2022-03-25 PROCEDURE — 76819 US OB/GYN EXTENDED PROCEDURE (VIEWPOINT): ICD-10-PCS | Mod: S$GLB,,, | Performed by: OBSTETRICS & GYNECOLOGY

## 2022-03-25 PROCEDURE — 99999 PR PBB SHADOW E&M-EST. PATIENT-LVL II: ICD-10-PCS | Mod: PBBFAC,,, | Performed by: NURSE PRACTITIONER

## 2022-03-25 PROCEDURE — 0502F SUBSEQUENT PRENATAL CARE: CPT | Mod: S$GLB,,, | Performed by: NURSE PRACTITIONER

## 2022-03-25 NOTE — PROGRESS NOTES
Presents at 34w2d for MAYELA. Excellent FM, denies VB, LOF, ctx. BPP is WNL. Labor/FKC precautions given. FU in 1 week for ANALI/MAEYLA.

## 2022-03-30 ENCOUNTER — PROCEDURE VISIT (OUTPATIENT)
Dept: OBSTETRICS AND GYNECOLOGY | Facility: CLINIC | Age: 27
End: 2022-03-30
Payer: OTHER GOVERNMENT

## 2022-03-30 DIAGNOSIS — I10 HTN (HYPERTENSION), BENIGN: ICD-10-CM

## 2022-03-30 DIAGNOSIS — Z3A.29 PREGNANCY WITH 29 COMPLETED WEEKS GESTATION: ICD-10-CM

## 2022-03-30 PROCEDURE — 76816 OB US FOLLOW-UP PER FETUS: CPT | Mod: S$GLB,,, | Performed by: OBSTETRICS & GYNECOLOGY

## 2022-03-30 PROCEDURE — 76816 US OB/GYN EXTENDED PROCEDURE (VIEWPOINT): ICD-10-PCS | Mod: S$GLB,,, | Performed by: OBSTETRICS & GYNECOLOGY

## 2022-03-30 PROCEDURE — 76819 US OB/GYN EXTENDED PROCEDURE (VIEWPOINT): ICD-10-PCS | Mod: S$GLB,,, | Performed by: OBSTETRICS & GYNECOLOGY

## 2022-03-30 PROCEDURE — 76819 FETAL BIOPHYS PROFIL W/O NST: CPT | Mod: S$GLB,,, | Performed by: OBSTETRICS & GYNECOLOGY

## 2022-04-01 ENCOUNTER — ROUTINE PRENATAL (OUTPATIENT)
Dept: OBSTETRICS AND GYNECOLOGY | Facility: CLINIC | Age: 27
End: 2022-04-01
Payer: OTHER GOVERNMENT

## 2022-04-01 ENCOUNTER — LAB VISIT (OUTPATIENT)
Dept: LAB | Facility: HOSPITAL | Age: 27
End: 2022-04-01
Attending: OBSTETRICS & GYNECOLOGY
Payer: OTHER GOVERNMENT

## 2022-04-01 VITALS
BODY MASS INDEX: 36.35 KG/M2 | SYSTOLIC BLOOD PRESSURE: 132 MMHG | DIASTOLIC BLOOD PRESSURE: 84 MMHG | WEIGHT: 225.19 LBS

## 2022-04-01 DIAGNOSIS — O10.919 CHRONIC HYPERTENSION DURING PREGNANCY: ICD-10-CM

## 2022-04-01 DIAGNOSIS — Z3A.35 35 WEEKS GESTATION OF PREGNANCY: ICD-10-CM

## 2022-04-01 DIAGNOSIS — Z3A.35 35 WEEKS GESTATION OF PREGNANCY: Primary | ICD-10-CM

## 2022-04-01 LAB
BASOPHILS # BLD AUTO: 0.03 K/UL (ref 0–0.2)
BASOPHILS NFR BLD: 0.3 % (ref 0–1.9)
DIFFERENTIAL METHOD: ABNORMAL
EOSINOPHIL # BLD AUTO: 0.1 K/UL (ref 0–0.5)
EOSINOPHIL NFR BLD: 0.5 % (ref 0–8)
ERYTHROCYTE [DISTWIDTH] IN BLOOD BY AUTOMATED COUNT: 13.5 % (ref 11.5–14.5)
HCT VFR BLD AUTO: 34.6 % (ref 37–48.5)
HGB BLD-MCNC: 11.3 G/DL (ref 12–16)
IMM GRANULOCYTES # BLD AUTO: 0.06 K/UL (ref 0–0.04)
IMM GRANULOCYTES NFR BLD AUTO: 0.5 % (ref 0–0.5)
LYMPHOCYTES # BLD AUTO: 2.3 K/UL (ref 1–4.8)
LYMPHOCYTES NFR BLD: 20.1 % (ref 18–48)
MCH RBC QN AUTO: 30.3 PG (ref 27–31)
MCHC RBC AUTO-ENTMCNC: 32.7 G/DL (ref 32–36)
MCV RBC AUTO: 93 FL (ref 82–98)
MONOCYTES # BLD AUTO: 0.9 K/UL (ref 0.3–1)
MONOCYTES NFR BLD: 8.3 % (ref 4–15)
NEUTROPHILS # BLD AUTO: 7.9 K/UL (ref 1.8–7.7)
NEUTROPHILS NFR BLD: 70.3 % (ref 38–73)
NRBC BLD-RTO: 0 /100 WBC
PLATELET # BLD AUTO: 226 K/UL (ref 150–450)
PMV BLD AUTO: 11.1 FL (ref 9.2–12.9)
RBC # BLD AUTO: 3.73 M/UL (ref 4–5.4)
WBC # BLD AUTO: 11.21 K/UL (ref 3.9–12.7)

## 2022-04-01 PROCEDURE — 86592 SYPHILIS TEST NON-TREP QUAL: CPT | Performed by: NURSE PRACTITIONER

## 2022-04-01 PROCEDURE — 99999 PR PBB SHADOW E&M-EST. PATIENT-LVL III: CPT | Mod: PBBFAC,,, | Performed by: NURSE PRACTITIONER

## 2022-04-01 PROCEDURE — 0502F PR SUBSEQUENT PRENATAL CARE: ICD-10-PCS | Mod: S$GLB,,, | Performed by: NURSE PRACTITIONER

## 2022-04-01 PROCEDURE — 85025 COMPLETE CBC W/AUTO DIFF WBC: CPT | Performed by: NURSE PRACTITIONER

## 2022-04-01 PROCEDURE — 0502F SUBSEQUENT PRENATAL CARE: CPT | Mod: S$GLB,,, | Performed by: NURSE PRACTITIONER

## 2022-04-01 PROCEDURE — 99999 PR PBB SHADOW E&M-EST. PATIENT-LVL III: ICD-10-PCS | Mod: PBBFAC,,, | Performed by: NURSE PRACTITIONER

## 2022-04-01 PROCEDURE — 87081 CULTURE SCREEN ONLY: CPT | Performed by: NURSE PRACTITIONER

## 2022-04-01 PROCEDURE — 87389 HIV-1 AG W/HIV-1&-2 AB AG IA: CPT | Performed by: NURSE PRACTITIONER

## 2022-04-01 NOTE — PROGRESS NOTES
Presents at 35w2d for MAYELA. Doing well. Growth scan/BPP reviewed. Denies preE symptoms. GBS swab performed. 3T labs today. Strict preE/labor precautions given. FU in 1 week for MAYELA.

## 2022-04-02 LAB — RPR SER QL: NORMAL

## 2022-04-04 LAB
BACTERIA SPEC AEROBE CULT: NORMAL
HIV 1+2 AB+HIV1 P24 AG SERPL QL IA: NEGATIVE

## 2022-04-05 RX ORDER — NIFEDIPINE 30 MG/1
30 TABLET, EXTENDED RELEASE ORAL 2 TIMES DAILY
Qty: 180 TABLET | Refills: 3 | Status: SHIPPED | OUTPATIENT
Start: 2022-04-05 | End: 2023-04-05

## 2022-04-08 ENCOUNTER — PROCEDURE VISIT (OUTPATIENT)
Dept: OBSTETRICS AND GYNECOLOGY | Facility: CLINIC | Age: 27
End: 2022-04-08
Payer: OTHER GOVERNMENT

## 2022-04-08 VITALS
BODY MASS INDEX: 35.94 KG/M2 | DIASTOLIC BLOOD PRESSURE: 70 MMHG | SYSTOLIC BLOOD PRESSURE: 120 MMHG | WEIGHT: 222.69 LBS

## 2022-04-08 DIAGNOSIS — Z3A.29 PREGNANCY WITH 29 COMPLETED WEEKS GESTATION: ICD-10-CM

## 2022-04-08 DIAGNOSIS — O10.919 CHRONIC HYPERTENSION DURING PREGNANCY: ICD-10-CM

## 2022-04-08 DIAGNOSIS — Z3A.36 36 WEEKS GESTATION OF PREGNANCY: Primary | ICD-10-CM

## 2022-04-08 DIAGNOSIS — I10 HTN (HYPERTENSION), BENIGN: ICD-10-CM

## 2022-04-08 PROCEDURE — 0502F SUBSEQUENT PRENATAL CARE: CPT | Mod: S$GLB,,, | Performed by: NURSE PRACTITIONER

## 2022-04-08 PROCEDURE — 76819 US OB/GYN EXTENDED PROCEDURE (VIEWPOINT): ICD-10-PCS | Mod: S$GLB,,, | Performed by: OBSTETRICS & GYNECOLOGY

## 2022-04-08 PROCEDURE — 99999 PR PBB SHADOW E&M-EST. PATIENT-LVL II: CPT | Mod: PBBFAC,,, | Performed by: NURSE PRACTITIONER

## 2022-04-08 PROCEDURE — 76819 FETAL BIOPHYS PROFIL W/O NST: CPT | Mod: S$GLB,,, | Performed by: OBSTETRICS & GYNECOLOGY

## 2022-04-08 PROCEDURE — 0502F PR SUBSEQUENT PRENATAL CARE: ICD-10-PCS | Mod: S$GLB,,, | Performed by: NURSE PRACTITIONER

## 2022-04-08 PROCEDURE — 99999 PR PBB SHADOW E&M-EST. PATIENT-LVL II: ICD-10-PCS | Mod: PBBFAC,,, | Performed by: NURSE PRACTITIONER

## 2022-04-08 NOTE — PROGRESS NOTES
Presents at 36w2d for MAYELA. Excellent FM, denies VB, LOF, ctx. Denies preE symptoms. Exercising, compliant with Labetalol. BPP is WNL. FU in 1 week for MAYELA.

## 2022-04-14 ENCOUNTER — PROCEDURE VISIT (OUTPATIENT)
Dept: OBSTETRICS AND GYNECOLOGY | Facility: CLINIC | Age: 27
End: 2022-04-14
Payer: OTHER GOVERNMENT

## 2022-04-14 ENCOUNTER — ROUTINE PRENATAL (OUTPATIENT)
Dept: OBSTETRICS AND GYNECOLOGY | Facility: CLINIC | Age: 27
End: 2022-04-14
Attending: STUDENT IN AN ORGANIZED HEALTH CARE EDUCATION/TRAINING PROGRAM
Payer: OTHER GOVERNMENT

## 2022-04-14 VITALS
DIASTOLIC BLOOD PRESSURE: 80 MMHG | BODY MASS INDEX: 36.29 KG/M2 | SYSTOLIC BLOOD PRESSURE: 142 MMHG | WEIGHT: 224.88 LBS

## 2022-04-14 DIAGNOSIS — Z3A.29 PREGNANCY WITH 29 COMPLETED WEEKS GESTATION: ICD-10-CM

## 2022-04-14 DIAGNOSIS — Z3A.37 37 WEEKS GESTATION OF PREGNANCY: Primary | ICD-10-CM

## 2022-04-14 DIAGNOSIS — I10 HTN (HYPERTENSION), BENIGN: ICD-10-CM

## 2022-04-14 PROCEDURE — 76819 FETAL BIOPHYS PROFIL W/O NST: CPT | Mod: S$GLB,,, | Performed by: OBSTETRICS & GYNECOLOGY

## 2022-04-14 PROCEDURE — 0502F SUBSEQUENT PRENATAL CARE: CPT | Mod: S$GLB,,, | Performed by: STUDENT IN AN ORGANIZED HEALTH CARE EDUCATION/TRAINING PROGRAM

## 2022-04-14 PROCEDURE — 76819 US OB/GYN EXTENDED PROCEDURE (VIEWPOINT): ICD-10-PCS | Mod: S$GLB,,, | Performed by: OBSTETRICS & GYNECOLOGY

## 2022-04-14 PROCEDURE — 99999 PR PBB SHADOW E&M-EST. PATIENT-LVL II: CPT | Mod: PBBFAC,,, | Performed by: STUDENT IN AN ORGANIZED HEALTH CARE EDUCATION/TRAINING PROGRAM

## 2022-04-14 PROCEDURE — 0502F PR SUBSEQUENT PRENATAL CARE: ICD-10-PCS | Mod: S$GLB,,, | Performed by: STUDENT IN AN ORGANIZED HEALTH CARE EDUCATION/TRAINING PROGRAM

## 2022-04-14 PROCEDURE — 99999 PR PBB SHADOW E&M-EST. PATIENT-LVL II: ICD-10-PCS | Mod: PBBFAC,,, | Performed by: STUDENT IN AN ORGANIZED HEALTH CARE EDUCATION/TRAINING PROGRAM

## 2022-04-15 NOTE — PROGRESS NOTES
Doing well.  No complaints.  Reports fetal movement.  Denies contractions, leakage of fluid, vaginal bleeding.  Labs today.  No HA, vision changes, CP, SOB, palpitations.  IOL next week as scheduled.  ED precautions reviewed.  BPP 8/8.  All questions answered.  RTC next week as scheduled.

## 2022-04-19 PROBLEM — O10.919 CHRONIC HYPERTENSION DURING PREGNANCY: Status: ACTIVE | Noted: 2022-04-19

## 2022-04-19 NOTE — PROGRESS NOTES
38w0d without major complaints.   Labor precautions reviewed.   Pt currently on 30 mg of Procardia BID and BPs are mild range.  EFW 8#12oz on U/S today.   BPP 8/8.   Induction scheduled for tomorrow.

## 2022-04-20 ENCOUNTER — PROCEDURE VISIT (OUTPATIENT)
Dept: OBSTETRICS AND GYNECOLOGY | Facility: CLINIC | Age: 27
End: 2022-04-20
Payer: OTHER GOVERNMENT

## 2022-04-20 VITALS
BODY MASS INDEX: 36.53 KG/M2 | WEIGHT: 226.31 LBS | DIASTOLIC BLOOD PRESSURE: 74 MMHG | SYSTOLIC BLOOD PRESSURE: 144 MMHG

## 2022-04-20 DIAGNOSIS — I10 HTN (HYPERTENSION), BENIGN: ICD-10-CM

## 2022-04-20 DIAGNOSIS — Z3A.38 38 WEEKS GESTATION OF PREGNANCY: ICD-10-CM

## 2022-04-20 DIAGNOSIS — Z3A.29 PREGNANCY WITH 29 COMPLETED WEEKS GESTATION: ICD-10-CM

## 2022-04-20 DIAGNOSIS — O99.210 MATERNAL OBESITY AFFECTING PREGNANCY, ANTEPARTUM: ICD-10-CM

## 2022-04-20 DIAGNOSIS — O10.919 CHRONIC HYPERTENSION DURING PREGNANCY: Primary | ICD-10-CM

## 2022-04-20 PROCEDURE — 76816 US OB/GYN EXTENDED PROCEDURE (VIEWPOINT): ICD-10-PCS | Mod: S$GLB,,, | Performed by: OBSTETRICS & GYNECOLOGY

## 2022-04-20 PROCEDURE — 76819 US OB/GYN EXTENDED PROCEDURE (VIEWPOINT): ICD-10-PCS | Mod: S$GLB,,, | Performed by: OBSTETRICS & GYNECOLOGY

## 2022-04-20 PROCEDURE — 76816 OB US FOLLOW-UP PER FETUS: CPT | Mod: S$GLB,,, | Performed by: OBSTETRICS & GYNECOLOGY

## 2022-04-20 PROCEDURE — 99999 PR PBB SHADOW E&M-EST. PATIENT-LVL III: ICD-10-PCS | Mod: PBBFAC,,, | Performed by: OBSTETRICS & GYNECOLOGY

## 2022-04-20 PROCEDURE — 0502F SUBSEQUENT PRENATAL CARE: CPT | Mod: S$GLB,,, | Performed by: OBSTETRICS & GYNECOLOGY

## 2022-04-20 PROCEDURE — 76819 FETAL BIOPHYS PROFIL W/O NST: CPT | Mod: S$GLB,,, | Performed by: OBSTETRICS & GYNECOLOGY

## 2022-04-20 PROCEDURE — 0502F PR SUBSEQUENT PRENATAL CARE: ICD-10-PCS | Mod: S$GLB,,, | Performed by: OBSTETRICS & GYNECOLOGY

## 2022-04-20 PROCEDURE — 99999 PR PBB SHADOW E&M-EST. PATIENT-LVL III: CPT | Mod: PBBFAC,,, | Performed by: OBSTETRICS & GYNECOLOGY

## 2022-04-20 NOTE — H&P
HISTORY AND PHYSICAL                                                OBSTETRICS        Chief Complaint: Induction of Labor     Subjective:      Eve Salazar is a 26 y.o.  female with IUP at 38w1d gestation who presents to L&D for induction of labor secondary to cHTN. She has been taking Procardia 30 mg BID. Pertinent medical history for this pregnancy includes obesity.  Patient reports normal fetal movement.  She denies contractions, vaginal bleeding and leakage of fluid.  Care this pregnancy has been with Dr. Vasques    PMHx:   Past Medical History:   Diagnosis Date    HSV (herpes simplex virus) anogenital infection        PSHx: No past surgical history on file.    All: Review of patient's allergies indicates:  No Known Allergies    Meds:   No medications prior to admission.       SH:   Social History     Socioeconomic History    Marital status:    Tobacco Use    Smoking status: Former Smoker    Smokeless tobacco: Never Used   Substance and Sexual Activity    Alcohol use: Not Currently     Alcohol/week: 2.0 standard drinks     Types: 2 Glasses of wine per week    Drug use: No    Sexual activity: Yes     Partners: Male     Birth control/protection: None       FH:   Family History   Problem Relation Age of Onset    Breast cancer Neg Hx     Cancer Neg Hx     Colon cancer Neg Hx     Diabetes Neg Hx     Eclampsia Neg Hx     Hypertension Neg Hx     Miscarriages / Stillbirths Neg Hx     Ovarian cancer Neg Hx      labor Neg Hx     Stroke Neg Hx        OBHx:   OB History    Para Term  AB Living   1 0 0 0 0 0   SAB IAB Ectopic Multiple Live Births   0 0 0 0 0      # Outcome Date GA Lbr Klever/2nd Weight Sex Delivery Anes PTL Lv   1 Current                Objective:      [unfilled]  [unfilled]  BMI Readings from Last 1 Encounters:   22 36.53 kg/m²         General:   alert and cooperative   HEENT:  normocephalic, atraumatic   Lungs:   Normal work of breathing    Heart:   Normal cap refill   Abdomen:  gravid, non-tender   Extremities non-tender, no edema   Derm: no rashes or lesions   Psych: appropriate mood and affect   Pelvis:  adequate       Cervix:     Dilation: 1 cm    Effacement: 50%    Station:  -3    Consistency: soft    Position posterior     Vertex by exam    Lab Review  Prenatal Labs:  Lab Results   Component Value Date    GROUPTRH O POS 2021    HGB 11.3 (L) 2022    HCT 34.6 (L) 2022     2022    RUBELLAIMMUN Reactive 2021    HEPBSAG Negative 2021    YDN40TJIB Negative 2022    RPR Non-reactive 2022    LABCHLA Not Detected 2021    LABNGO Not Detected 2021    LABURIN  2021     Multiple organisms isolated. None in predominance.  Repeat if    LABURIN clinically necessary. 2021    OBGLUCOSESCR 119 02/10/2022    STREPBCULT No Group B Streptococcus isolated 2022        Assessment:     26 y.o.  at 38w0d gestation here induction of labor   2. cHTN  3. Obesity    Plan:     1. Risks, benefits, alternatives and possible complications of delivery, possible , possible blood transfusion, possible operative vaginal delivery have been discussed in detail with the patient. All questions have been answered, and Ms. Salazar has voiced understanding and agrees to the treatment plan.  2. Consents signed and in chart  3. Admit to Labor and Delivery unit

## 2022-04-21 ENCOUNTER — ANESTHESIA (OUTPATIENT)
Dept: OBSTETRICS AND GYNECOLOGY | Facility: OTHER | Age: 27
End: 2022-04-21
Payer: OTHER GOVERNMENT

## 2022-04-21 ENCOUNTER — HOSPITAL ENCOUNTER (INPATIENT)
Facility: OTHER | Age: 27
LOS: 3 days | Discharge: HOME | End: 2022-04-24
Attending: OBSTETRICS & GYNECOLOGY | Admitting: OBSTETRICS & GYNECOLOGY
Payer: OTHER GOVERNMENT

## 2022-04-21 DIAGNOSIS — O99.210 MATERNAL OBESITY AFFECTING PREGNANCY, ANTEPARTUM: ICD-10-CM

## 2022-04-21 DIAGNOSIS — Z34.90 ENCOUNTER FOR INDUCTION OF LABOR: ICD-10-CM

## 2022-04-21 DIAGNOSIS — Z98.891 S/P CESAREAN SECTION: Primary | ICD-10-CM

## 2022-04-21 DIAGNOSIS — O10.919 CHRONIC HYPERTENSION DURING PREGNANCY: ICD-10-CM

## 2022-04-21 DIAGNOSIS — O16.3 HYPERTENSION DURING PREGNANCY IN THIRD TRIMESTER, UNSPECIFIED HYPERTENSION IN PREGNANCY TYPE: ICD-10-CM

## 2022-04-21 LAB
ABO + RH BLD: NORMAL
ALBUMIN SERPL BCP-MCNC: 3 G/DL (ref 3.5–5.2)
ALP SERPL-CCNC: 142 U/L (ref 55–135)
ALT SERPL W/O P-5'-P-CCNC: 19 U/L (ref 10–44)
ANION GAP SERPL CALC-SCNC: 16 MMOL/L (ref 8–16)
AST SERPL-CCNC: 23 U/L (ref 10–40)
BASOPHILS # BLD AUTO: 0.02 K/UL (ref 0–0.2)
BASOPHILS NFR BLD: 0.2 % (ref 0–1.9)
BILIRUB SERPL-MCNC: 0.3 MG/DL (ref 0.1–1)
BLD GP AB SCN CELLS X3 SERPL QL: NORMAL
BUN SERPL-MCNC: 9 MG/DL (ref 6–20)
CALCIUM SERPL-MCNC: 9.6 MG/DL (ref 8.7–10.5)
CHLORIDE SERPL-SCNC: 108 MMOL/L (ref 95–110)
CO2 SERPL-SCNC: 15 MMOL/L (ref 23–29)
CREAT SERPL-MCNC: 0.6 MG/DL (ref 0.5–1.4)
CREAT UR-MCNC: 56.3 MG/DL (ref 15–325)
DIFFERENTIAL METHOD: ABNORMAL
EOSINOPHIL # BLD AUTO: 0 K/UL (ref 0–0.5)
EOSINOPHIL NFR BLD: 0.3 % (ref 0–8)
ERYTHROCYTE [DISTWIDTH] IN BLOOD BY AUTOMATED COUNT: 13.3 % (ref 11.5–14.5)
EST. GFR  (AFRICAN AMERICAN): >60 ML/MIN/1.73 M^2
EST. GFR  (NON AFRICAN AMERICAN): >60 ML/MIN/1.73 M^2
GLUCOSE SERPL-MCNC: 119 MG/DL (ref 70–110)
HCT VFR BLD AUTO: 34.4 % (ref 37–48.5)
HGB BLD-MCNC: 11.6 G/DL (ref 12–16)
IMM GRANULOCYTES # BLD AUTO: 0.03 K/UL (ref 0–0.04)
IMM GRANULOCYTES NFR BLD AUTO: 0.3 % (ref 0–0.5)
LYMPHOCYTES # BLD AUTO: 2.1 K/UL (ref 1–4.8)
LYMPHOCYTES NFR BLD: 21.9 % (ref 18–48)
MCH RBC QN AUTO: 31.2 PG (ref 27–31)
MCHC RBC AUTO-ENTMCNC: 33.7 G/DL (ref 32–36)
MCV RBC AUTO: 93 FL (ref 82–98)
MONOCYTES # BLD AUTO: 1 K/UL (ref 0.3–1)
MONOCYTES NFR BLD: 10.1 % (ref 4–15)
NEUTROPHILS # BLD AUTO: 6.6 K/UL (ref 1.8–7.7)
NEUTROPHILS NFR BLD: 67.2 % (ref 38–73)
NRBC BLD-RTO: 0 /100 WBC
PLATELET # BLD AUTO: 217 K/UL (ref 150–450)
PMV BLD AUTO: 11.2 FL (ref 9.2–12.9)
POTASSIUM SERPL-SCNC: 3.5 MMOL/L (ref 3.5–5.1)
PROT SERPL-MCNC: 6.3 G/DL (ref 6–8.4)
PROT UR-MCNC: 8 MG/DL (ref 0–15)
PROT/CREAT UR: 0.14 MG/G{CREAT} (ref 0–0.2)
RBC # BLD AUTO: 3.72 M/UL (ref 4–5.4)
SARS-COV-2 RDRP RESP QL NAA+PROBE: NEGATIVE
SODIUM SERPL-SCNC: 139 MMOL/L (ref 136–145)
WBC # BLD AUTO: 9.75 K/UL (ref 3.9–12.7)

## 2022-04-21 PROCEDURE — 80053 COMPREHEN METABOLIC PANEL: CPT

## 2022-04-21 PROCEDURE — U0002 COVID-19 LAB TEST NON-CDC: HCPCS

## 2022-04-21 PROCEDURE — 25000003 PHARM REV CODE 250

## 2022-04-21 PROCEDURE — 25000003 PHARM REV CODE 250: Performed by: STUDENT IN AN ORGANIZED HEALTH CARE EDUCATION/TRAINING PROGRAM

## 2022-04-21 PROCEDURE — 82570 ASSAY OF URINE CREATININE: CPT

## 2022-04-21 PROCEDURE — 86901 BLOOD TYPING SEROLOGIC RH(D): CPT

## 2022-04-21 PROCEDURE — 85025 COMPLETE CBC W/AUTO DIFF WBC: CPT

## 2022-04-21 PROCEDURE — 11000001 HC ACUTE MED/SURG PRIVATE ROOM

## 2022-04-21 PROCEDURE — 63600175 PHARM REV CODE 636 W HCPCS

## 2022-04-21 RX ORDER — DIPHENOXYLATE HYDROCHLORIDE AND ATROPINE SULFATE 2.5; .025 MG/1; MG/1
1 TABLET ORAL 4 TIMES DAILY PRN
Status: DISCONTINUED | OUTPATIENT
Start: 2022-04-21 | End: 2022-04-22

## 2022-04-21 RX ORDER — CARBOPROST TROMETHAMINE 250 UG/ML
250 INJECTION, SOLUTION INTRAMUSCULAR
Status: DISCONTINUED | OUTPATIENT
Start: 2022-04-21 | End: 2022-04-22

## 2022-04-21 RX ORDER — ONDANSETRON 8 MG/1
8 TABLET, ORALLY DISINTEGRATING ORAL EVERY 8 HOURS PRN
Status: DISCONTINUED | OUTPATIENT
Start: 2022-04-21 | End: 2022-04-22

## 2022-04-21 RX ORDER — SODIUM CHLORIDE, SODIUM LACTATE, POTASSIUM CHLORIDE, CALCIUM CHLORIDE 600; 310; 30; 20 MG/100ML; MG/100ML; MG/100ML; MG/100ML
INJECTION, SOLUTION INTRAVENOUS CONTINUOUS
Status: DISCONTINUED | OUTPATIENT
Start: 2022-04-21 | End: 2022-04-22

## 2022-04-21 RX ORDER — PROCHLORPERAZINE EDISYLATE 5 MG/ML
5 INJECTION INTRAMUSCULAR; INTRAVENOUS EVERY 6 HOURS PRN
Status: DISCONTINUED | OUTPATIENT
Start: 2022-04-21 | End: 2022-04-22

## 2022-04-21 RX ORDER — TRANEXAMIC ACID 100 MG/ML
1000 INJECTION, SOLUTION INTRAVENOUS ONCE AS NEEDED
Status: DISCONTINUED | OUTPATIENT
Start: 2022-04-21 | End: 2022-04-22

## 2022-04-21 RX ORDER — NIFEDIPINE 30 MG/1
30 TABLET, EXTENDED RELEASE ORAL 2 TIMES DAILY
Status: DISCONTINUED | OUTPATIENT
Start: 2022-04-21 | End: 2022-04-24 | Stop reason: HOSPADM

## 2022-04-21 RX ORDER — CALCIUM CARBONATE 200(500)MG
500 TABLET,CHEWABLE ORAL 3 TIMES DAILY PRN
Status: DISCONTINUED | OUTPATIENT
Start: 2022-04-21 | End: 2022-04-22

## 2022-04-21 RX ORDER — METHYLERGONOVINE MALEATE 0.2 MG/ML
200 INJECTION INTRAVENOUS
Status: DISCONTINUED | OUTPATIENT
Start: 2022-04-21 | End: 2022-04-22

## 2022-04-21 RX ORDER — OXYTOCIN/RINGER'S LACTATE 30/500 ML
95 PLASTIC BAG, INJECTION (ML) INTRAVENOUS ONCE
Status: DISCONTINUED | OUTPATIENT
Start: 2022-04-21 | End: 2022-04-22

## 2022-04-21 RX ORDER — OXYTOCIN/RINGER'S LACTATE 30/500 ML
334 PLASTIC BAG, INJECTION (ML) INTRAVENOUS ONCE
Status: DISCONTINUED | OUTPATIENT
Start: 2022-04-21 | End: 2022-04-22

## 2022-04-21 RX ORDER — SODIUM CHLORIDE 9 MG/ML
INJECTION, SOLUTION INTRAVENOUS
Status: DISCONTINUED | OUTPATIENT
Start: 2022-04-21 | End: 2022-04-22

## 2022-04-21 RX ORDER — MISOPROSTOL 200 UG/1
800 TABLET ORAL
Status: DISCONTINUED | OUTPATIENT
Start: 2022-04-21 | End: 2022-04-22

## 2022-04-21 RX ORDER — SIMETHICONE 80 MG
1 TABLET,CHEWABLE ORAL 4 TIMES DAILY PRN
Status: DISCONTINUED | OUTPATIENT
Start: 2022-04-21 | End: 2022-04-22

## 2022-04-21 RX ADMIN — SODIUM CHLORIDE, SODIUM LACTATE, POTASSIUM CHLORIDE, AND CALCIUM CHLORIDE: .6; .31; .03; .02 INJECTION, SOLUTION INTRAVENOUS at 06:04

## 2022-04-21 RX ADMIN — CALCIUM CARBONATE (ANTACID) CHEW TAB 500 MG 500 MG: 500 CHEW TAB at 08:04

## 2022-04-21 RX ADMIN — MISOPROSTOL 50 MCG: 100 TABLET ORAL at 07:04

## 2022-04-21 RX ADMIN — NIFEDIPINE 30 MG: 30 TABLET, FILM COATED, EXTENDED RELEASE ORAL at 08:04

## 2022-04-21 NOTE — ANESTHESIA PREPROCEDURE EVALUATION
[facility]  Anesthesia Pre-Operative Evaluation         Patient Name: Eve Salazar  YOB: 1995  MRN: [id number]    2022      Eve Salazar is a 26 y.o. female  @ 38w1d who presents for induction of labor secondary to cHTN    Patient denies bleeding disorders, spine surgery, and asthma     OB History    Para Term  AB Living   1 0 0 0 0 0   SAB IAB Ectopic Multiple Live Births   0 0 0 0 0      # Outcome Date GA Lbr Klever/2nd Weight Sex Delivery Anes PTL Lv   1 Current                Review of patient's allergies indicates:  No Known Allergies    Wt Readings from Last 1 Encounters:   22 1318 102.7 kg (226 lb 4.8 oz)       BP Readings from Last 3 Encounters:   22 (!) 144/74   22 (!) 142/80   22 120/70       Patient Active Problem List   Diagnosis    HSV (herpes simplex virus) anogenital infection    Pregnant    HTN (hypertension)    Anemia -- HCT 34    Chronic hypertension during pregnancy       No past surgical history on file.    Social History     Socioeconomic History    Marital status:    Tobacco Use    Smoking status: Former Smoker    Smokeless tobacco: Never Used   Substance and Sexual Activity    Alcohol use: Not Currently     Alcohol/week: 2.0 standard drinks     Types: 2 Glasses of wine per week    Drug use: No    Sexual activity: Yes     Partners: Male     Birth control/protection: None         Chemistry        Component Value Date/Time     2022 1108    K 4.3 2022 1108     2022 1108    CO2 21 (L) 2022 1108    BUN 6 2022 1108    CREATININE 0.6 2022 1108    GLU 71 2022 1108        Component Value Date/Time    CALCIUM 9.8 2022 1108    ALKPHOS 74 2022 1108    AST 33 2022 1108    ALT 29 2022 1108    BILITOT 0.3 2022 1108    ESTGFRAFRICA >60 2022 1108    EGFRNONAA >60  02/28/2022 1108            Lab Results   Component Value Date    WBC 11.21 04/01/2022    HGB 11.3 (L) 04/01/2022    HCT 34.6 (L) 04/01/2022    MCV 93 04/01/2022     04/01/2022       No results for input(s): PT, INR, PROTIME, APTT in the last 72 hours.            Pre-op Assessment    I have reviewed the Patient Summary Reports.     I have reviewed the Nursing Notes.    I have reviewed the Medications.     Review of Systems  Anesthesia Hx:  Neg history of prior surgery. Denies Family Hx of Anesthesia complications.    EENT/Dental:EENT/Dental Normal   Cardiovascular:   Hypertension Denies MI.  Denies CAD.       Pulmonary:  Pulmonary Normal    Renal/:  Renal/ Normal     Hepatic/GI:  Hepatic/GI Normal    Neurological:  Neurology Normal    Endocrine:  Endocrine Normal        Physical Exam  General: Well nourished, Cooperative, Alert and Oriented    Airway:  Mallampati: II   Mouth Opening: Normal  TM Distance: Normal  Tongue: Normal  Neck ROM: Normal ROM    Dental:  Intact        Anesthesia Plan  Type of Anesthesia, risks & benefits discussed:    Anesthesia Type: Epidural  Intra-op Monitoring Plan: Standard ASA Monitors  Post Op Pain Control Plan: multimodal analgesia and IV/PO Opioids PRN  Informed Consent: Informed consent signed with the Patient and all parties understand the risks and agree with anesthesia plan.  All questions answered.   ASA Score: 2  Day of Surgery Review of History & Physical: H&P Update referred to the surgeon/provider.    Ready For Surgery From Anesthesia Perspective.     .

## 2022-04-22 PROBLEM — Z98.891 S/P CESAREAN SECTION: Status: ACTIVE | Noted: 2022-04-22

## 2022-04-22 PROCEDURE — 36004725 HC OB OR TIME LEV III - EA ADD 15 MIN: Performed by: OBSTETRICS & GYNECOLOGY

## 2022-04-22 PROCEDURE — 63600175 PHARM REV CODE 636 W HCPCS: Performed by: STUDENT IN AN ORGANIZED HEALTH CARE EDUCATION/TRAINING PROGRAM

## 2022-04-22 PROCEDURE — 01968 ANES/ANALG CS DLVR NEURAXIAL: CPT | Mod: QY,,, | Performed by: ANESTHESIOLOGY

## 2022-04-22 PROCEDURE — 72100003 HC LABOR CARE, EA. ADDL. 8 HRS

## 2022-04-22 PROCEDURE — 63600175 PHARM REV CODE 636 W HCPCS

## 2022-04-22 PROCEDURE — 59510 PR FULL ROUT OBSTE CARE,CESAREAN DELIV: ICD-10-PCS | Mod: ,,, | Performed by: OBSTETRICS & GYNECOLOGY

## 2022-04-22 PROCEDURE — C1751 CATH, INF, PER/CENT/MIDLINE: HCPCS | Performed by: ANESTHESIOLOGY

## 2022-04-22 PROCEDURE — 51702 INSERT TEMP BLADDER CATH: CPT

## 2022-04-22 PROCEDURE — 59514 PRA REAN DELIVERY ONLY: ICD-10-PCS | Mod: QY,,, | Performed by: ANESTHESIOLOGY

## 2022-04-22 PROCEDURE — 71000033 HC RECOVERY, INTIAL HOUR: Performed by: OBSTETRICS & GYNECOLOGY

## 2022-04-22 PROCEDURE — 25000003 PHARM REV CODE 250: Performed by: ANESTHESIOLOGY

## 2022-04-22 PROCEDURE — 25000003 PHARM REV CODE 250

## 2022-04-22 PROCEDURE — 11000001 HC ACUTE MED/SURG PRIVATE ROOM

## 2022-04-22 PROCEDURE — 63600175 PHARM REV CODE 636 W HCPCS: Performed by: OBSTETRICS & GYNECOLOGY

## 2022-04-22 PROCEDURE — 25000003 PHARM REV CODE 250: Performed by: STUDENT IN AN ORGANIZED HEALTH CARE EDUCATION/TRAINING PROGRAM

## 2022-04-22 PROCEDURE — 27200710 HC EPIDURAL INFUSION PUMP SET: Performed by: ANESTHESIOLOGY

## 2022-04-22 PROCEDURE — 37000008 HC ANESTHESIA 1ST 15 MINUTES: Performed by: OBSTETRICS & GYNECOLOGY

## 2022-04-22 PROCEDURE — 25000003 PHARM REV CODE 250: Performed by: OBSTETRICS & GYNECOLOGY

## 2022-04-22 PROCEDURE — 37000009 HC ANESTHESIA EA ADD 15 MINS: Performed by: OBSTETRICS & GYNECOLOGY

## 2022-04-22 PROCEDURE — 71000039 HC RECOVERY, EACH ADD'L HOUR: Performed by: OBSTETRICS & GYNECOLOGY

## 2022-04-22 PROCEDURE — 59514 CESAREAN DELIVERY ONLY: CPT | Mod: QY,,, | Performed by: ANESTHESIOLOGY

## 2022-04-22 PROCEDURE — 63600175 PHARM REV CODE 636 W HCPCS: Performed by: ANESTHESIOLOGY

## 2022-04-22 PROCEDURE — 36004724 HC OB OR TIME LEV III - 1ST 15 MIN: Performed by: OBSTETRICS & GYNECOLOGY

## 2022-04-22 PROCEDURE — 62326 NJX INTERLAMINAR LMBR/SAC: CPT | Performed by: STUDENT IN AN ORGANIZED HEALTH CARE EDUCATION/TRAINING PROGRAM

## 2022-04-22 PROCEDURE — 59200 INSERT CERVICAL DILATOR: CPT

## 2022-04-22 PROCEDURE — 72100002 HC LABOR CARE, 1ST 8 HOURS

## 2022-04-22 PROCEDURE — 59510 CESAREAN DELIVERY: CPT | Mod: ,,, | Performed by: OBSTETRICS & GYNECOLOGY

## 2022-04-22 PROCEDURE — 01968 PR INSERT CATH,ART,PERCUT,SHORTTERM: ICD-10-PCS | Mod: QY,,, | Performed by: ANESTHESIOLOGY

## 2022-04-22 RX ORDER — SODIUM CHLORIDE 0.9 % (FLUSH) 0.9 %
10 SYRINGE (ML) INJECTION
Status: DISCONTINUED | OUTPATIENT
Start: 2022-04-22 | End: 2022-04-24 | Stop reason: HOSPADM

## 2022-04-22 RX ORDER — CEFAZOLIN SODIUM 1 G/3ML
INJECTION, POWDER, FOR SOLUTION INTRAMUSCULAR; INTRAVENOUS
Status: DISCONTINUED | OUTPATIENT
Start: 2022-04-22 | End: 2022-04-22

## 2022-04-22 RX ORDER — FAMOTIDINE 10 MG/ML
20 INJECTION INTRAVENOUS ONCE
Status: COMPLETED | OUTPATIENT
Start: 2022-04-22 | End: 2022-04-22

## 2022-04-22 RX ORDER — KETOROLAC TROMETHAMINE 30 MG/ML
30 INJECTION, SOLUTION INTRAMUSCULAR; INTRAVENOUS EVERY 6 HOURS
Status: DISCONTINUED | OUTPATIENT
Start: 2022-04-23 | End: 2022-04-22

## 2022-04-22 RX ORDER — ADHESIVE BANDAGE
30 BANDAGE TOPICAL 2 TIMES DAILY PRN
Status: DISCONTINUED | OUTPATIENT
Start: 2022-04-23 | End: 2022-04-24 | Stop reason: HOSPADM

## 2022-04-22 RX ORDER — IBUPROFEN 600 MG/1
600 TABLET ORAL EVERY 6 HOURS
Status: DISCONTINUED | OUTPATIENT
Start: 2022-04-22 | End: 2022-04-22

## 2022-04-22 RX ORDER — MORPHINE SULFATE 0.5 MG/ML
INJECTION, SOLUTION EPIDURAL; INTRATHECAL; INTRAVENOUS
Status: DISCONTINUED | OUTPATIENT
Start: 2022-04-22 | End: 2022-04-22

## 2022-04-22 RX ORDER — PROCHLORPERAZINE EDISYLATE 5 MG/ML
5 INJECTION INTRAMUSCULAR; INTRAVENOUS EVERY 6 HOURS PRN
Status: DISCONTINUED | OUTPATIENT
Start: 2022-04-22 | End: 2022-04-24 | Stop reason: HOSPADM

## 2022-04-22 RX ORDER — LIDOCAINE HYDROCHLORIDE AND EPINEPHRINE 15; 5 MG/ML; UG/ML
INJECTION, SOLUTION EPIDURAL
Status: DISCONTINUED | OUTPATIENT
Start: 2022-04-22 | End: 2022-04-22

## 2022-04-22 RX ORDER — NALBUPHINE HYDROCHLORIDE 10 MG/ML
5 INJECTION, SOLUTION INTRAMUSCULAR; INTRAVENOUS; SUBCUTANEOUS ONCE
Status: COMPLETED | OUTPATIENT
Start: 2022-04-22 | End: 2022-04-22

## 2022-04-22 RX ORDER — DIPHENHYDRAMINE HYDROCHLORIDE 50 MG/ML
25 INJECTION INTRAMUSCULAR; INTRAVENOUS ONCE
Status: COMPLETED | OUTPATIENT
Start: 2022-04-22 | End: 2022-04-22

## 2022-04-22 RX ORDER — NALBUPHINE HYDROCHLORIDE 10 MG/ML
2.5 INJECTION, SOLUTION INTRAMUSCULAR; INTRAVENOUS; SUBCUTANEOUS
Status: DISCONTINUED | OUTPATIENT
Start: 2022-04-22 | End: 2022-04-22

## 2022-04-22 RX ORDER — DEXAMETHASONE SODIUM PHOSPHATE 4 MG/ML
INJECTION, SOLUTION INTRA-ARTICULAR; INTRALESIONAL; INTRAMUSCULAR; INTRAVENOUS; SOFT TISSUE
Status: DISCONTINUED | OUTPATIENT
Start: 2022-04-22 | End: 2022-04-22

## 2022-04-22 RX ORDER — OXYTOCIN/RINGER'S LACTATE 30/500 ML
0-30 PLASTIC BAG, INJECTION (ML) INTRAVENOUS CONTINUOUS
Status: DISCONTINUED | OUTPATIENT
Start: 2022-04-22 | End: 2022-04-22

## 2022-04-22 RX ORDER — OXYCODONE AND ACETAMINOPHEN 5; 325 MG/1; MG/1
1 TABLET ORAL EVERY 4 HOURS PRN
Status: DISCONTINUED | OUTPATIENT
Start: 2022-04-22 | End: 2022-04-24 | Stop reason: HOSPADM

## 2022-04-22 RX ORDER — ACETAMINOPHEN 10 MG/ML
INJECTION, SOLUTION INTRAVENOUS
Status: DISCONTINUED | OUTPATIENT
Start: 2022-04-22 | End: 2022-04-22

## 2022-04-22 RX ORDER — KETOROLAC TROMETHAMINE 30 MG/ML
30 INJECTION, SOLUTION INTRAMUSCULAR; INTRAVENOUS EVERY 6 HOURS
Status: COMPLETED | OUTPATIENT
Start: 2022-04-22 | End: 2022-04-23

## 2022-04-22 RX ORDER — ONDANSETRON 2 MG/ML
INJECTION INTRAMUSCULAR; INTRAVENOUS
Status: DISCONTINUED | OUTPATIENT
Start: 2022-04-22 | End: 2022-04-22

## 2022-04-22 RX ORDER — CHLORHEXIDINE GLUCONATE ORAL RINSE 1.2 MG/ML
10 SOLUTION DENTAL 2 TIMES DAILY
Status: DISCONTINUED | OUTPATIENT
Start: 2022-04-22 | End: 2022-04-24 | Stop reason: HOSPADM

## 2022-04-22 RX ORDER — ACETAMINOPHEN 325 MG/1
650 TABLET ORAL EVERY 6 HOURS PRN
Status: DISCONTINUED | OUTPATIENT
Start: 2022-04-22 | End: 2022-04-24 | Stop reason: HOSPADM

## 2022-04-22 RX ORDER — LIDOCAINE HCL/EPINEPHRINE/PF 2%-1:200K
VIAL (ML) INJECTION
Status: DISCONTINUED | OUTPATIENT
Start: 2022-04-22 | End: 2022-04-22

## 2022-04-22 RX ORDER — FENTANYL CITRATE 50 UG/ML
INJECTION, SOLUTION INTRAMUSCULAR; INTRAVENOUS
Status: DISCONTINUED | OUTPATIENT
Start: 2022-04-22 | End: 2022-04-22

## 2022-04-22 RX ORDER — HYDROCORTISONE 25 MG/G
CREAM TOPICAL 3 TIMES DAILY PRN
Status: DISCONTINUED | OUTPATIENT
Start: 2022-04-22 | End: 2022-04-24 | Stop reason: HOSPADM

## 2022-04-22 RX ORDER — BUPIVACAINE HYDROCHLORIDE 2.5 MG/ML
INJECTION, SOLUTION EPIDURAL; INFILTRATION; INTRACAUDAL
Status: DISPENSED
Start: 2022-04-22 | End: 2022-04-22

## 2022-04-22 RX ORDER — FENTANYL/BUPIVACAINE/NS/PF 2MCG/ML-.1
PLASTIC BAG, INJECTION (ML) INJECTION CONTINUOUS
Status: DISCONTINUED | OUTPATIENT
Start: 2022-04-22 | End: 2022-04-22

## 2022-04-22 RX ORDER — DOCUSATE SODIUM 100 MG/1
200 CAPSULE, LIQUID FILLED ORAL 2 TIMES DAILY
Status: DISCONTINUED | OUTPATIENT
Start: 2022-04-22 | End: 2022-04-24 | Stop reason: HOSPADM

## 2022-04-22 RX ORDER — AMOXICILLIN 250 MG
1 CAPSULE ORAL NIGHTLY PRN
Status: DISCONTINUED | OUTPATIENT
Start: 2022-04-22 | End: 2022-04-24 | Stop reason: HOSPADM

## 2022-04-22 RX ORDER — OXYCODONE AND ACETAMINOPHEN 10; 325 MG/1; MG/1
1 TABLET ORAL EVERY 4 HOURS PRN
Status: DISCONTINUED | OUTPATIENT
Start: 2022-04-22 | End: 2022-04-24 | Stop reason: HOSPADM

## 2022-04-22 RX ORDER — FENTANYL CITRATE 50 UG/ML
INJECTION, SOLUTION INTRAMUSCULAR; INTRAVENOUS
Status: COMPLETED
Start: 2022-04-22 | End: 2022-04-22

## 2022-04-22 RX ORDER — ONDANSETRON 8 MG/1
8 TABLET, ORALLY DISINTEGRATING ORAL EVERY 8 HOURS PRN
Status: DISCONTINUED | OUTPATIENT
Start: 2022-04-22 | End: 2022-04-24 | Stop reason: HOSPADM

## 2022-04-22 RX ORDER — DIPHENHYDRAMINE HCL 25 MG
25 CAPSULE ORAL EVERY 4 HOURS PRN
Status: DISCONTINUED | OUTPATIENT
Start: 2022-04-22 | End: 2022-04-24 | Stop reason: HOSPADM

## 2022-04-22 RX ORDER — OXYTOCIN/RINGER'S LACTATE 30/500 ML
95 PLASTIC BAG, INJECTION (ML) INTRAVENOUS ONCE
Status: COMPLETED | OUTPATIENT
Start: 2022-04-22 | End: 2022-04-22

## 2022-04-22 RX ORDER — SODIUM CITRATE AND CITRIC ACID MONOHYDRATE 334; 500 MG/5ML; MG/5ML
30 SOLUTION ORAL ONCE
Status: COMPLETED | OUTPATIENT
Start: 2022-04-22 | End: 2022-04-22

## 2022-04-22 RX ORDER — SIMETHICONE 80 MG
1 TABLET,CHEWABLE ORAL EVERY 6 HOURS PRN
Status: DISCONTINUED | OUTPATIENT
Start: 2022-04-22 | End: 2022-04-24 | Stop reason: HOSPADM

## 2022-04-22 RX ORDER — FENTANYL/BUPIVACAINE/NS/PF 2MCG/ML-.1
PLASTIC BAG, INJECTION (ML) INJECTION
Status: COMPLETED
Start: 2022-04-22 | End: 2022-04-22

## 2022-04-22 RX ORDER — BISACODYL 10 MG
10 SUPPOSITORY, RECTAL RECTAL ONCE AS NEEDED
Status: DISCONTINUED | OUTPATIENT
Start: 2022-04-22 | End: 2022-04-24 | Stop reason: HOSPADM

## 2022-04-22 RX ORDER — FENTANYL/BUPIVACAINE/NS/PF 2MCG/ML-.1
PLASTIC BAG, INJECTION (ML) INJECTION CONTINUOUS PRN
Status: DISCONTINUED | OUTPATIENT
Start: 2022-04-22 | End: 2022-04-22

## 2022-04-22 RX ADMIN — DIPHENHYDRAMINE HYDROCHLORIDE 25 MG: 50 INJECTION, SOLUTION INTRAMUSCULAR; INTRAVENOUS at 04:04

## 2022-04-22 RX ADMIN — SODIUM CHLORIDE, SODIUM LACTATE, POTASSIUM CHLORIDE, AND CALCIUM CHLORIDE: .6; .31; .03; .02 INJECTION, SOLUTION INTRAVENOUS at 02:04

## 2022-04-22 RX ADMIN — Medication 1 MG: at 05:04

## 2022-04-22 RX ADMIN — FAMOTIDINE 20 MG: 10 INJECTION, SOLUTION INTRAVENOUS at 04:04

## 2022-04-22 RX ADMIN — NALBUPHINE HYDROCHLORIDE 5 MG: 10 INJECTION, SOLUTION INTRAMUSCULAR; INTRAVENOUS; SUBCUTANEOUS at 08:04

## 2022-04-22 RX ADMIN — LIDOCAINE HYDROCHLORIDE,EPINEPHRINE BITARTRATE 5 ML: 20; .005 INJECTION, SOLUTION EPIDURAL; INFILTRATION; INTRACAUDAL; PERINEURAL at 04:04

## 2022-04-22 RX ADMIN — KETOROLAC TROMETHAMINE 30 MG: 30 INJECTION, SOLUTION INTRAMUSCULAR at 06:04

## 2022-04-22 RX ADMIN — CEFAZOLIN 2 G: 330 INJECTION, POWDER, FOR SOLUTION INTRAMUSCULAR; INTRAVENOUS at 04:04

## 2022-04-22 RX ADMIN — DEXAMETHASONE SODIUM PHOSPHATE 4 MG: 4 INJECTION, SOLUTION INTRAMUSCULAR; INTRAVENOUS at 04:04

## 2022-04-22 RX ADMIN — Medication 10 ML/HR: at 02:04

## 2022-04-22 RX ADMIN — DOCUSATE SODIUM 200 MG: 100 CAPSULE, LIQUID FILLED ORAL at 08:04

## 2022-04-22 RX ADMIN — BUPIVACAINE HYDROCHLORIDE 4 ML: 2.5 INJECTION, SOLUTION EPIDURAL; INFILTRATION; INTRACAUDAL; PERINEURAL at 02:04

## 2022-04-22 RX ADMIN — FENTANYL CITRATE 100 MCG: 50 INJECTION, SOLUTION INTRAMUSCULAR; INTRAVENOUS at 02:04

## 2022-04-22 RX ADMIN — Medication 4 MILLI-UNITS/MIN: at 01:04

## 2022-04-22 RX ADMIN — NALBUPHINE HYDROCHLORIDE 2.5 MG: 10 INJECTION, SOLUTION INTRAMUSCULAR; INTRAVENOUS; SUBCUTANEOUS at 03:04

## 2022-04-22 RX ADMIN — SODIUM CHLORIDE, SODIUM LACTATE, POTASSIUM CHLORIDE, AND CALCIUM CHLORIDE 1000 ML: .6; .31; .03; .02 INJECTION, SOLUTION INTRAVENOUS at 01:04

## 2022-04-22 RX ADMIN — CALCIUM CARBONATE (ANTACID) CHEW TAB 500 MG 500 MG: 500 CHEW TAB at 02:04

## 2022-04-22 RX ADMIN — AZITHROMYCIN 500 MG: 500 INJECTION, POWDER, LYOPHILIZED, FOR SOLUTION INTRAVENOUS at 02:04

## 2022-04-22 RX ADMIN — NIFEDIPINE 30 MG: 30 TABLET, FILM COATED, EXTENDED RELEASE ORAL at 08:04

## 2022-04-22 RX ADMIN — ONDANSETRON HYDROCHLORIDE 4 MG: 2 INJECTION INTRAMUSCULAR; INTRAVENOUS at 04:04

## 2022-04-22 RX ADMIN — LIDOCAINE HYDROCHLORIDE,EPINEPHRINE BITARTRATE 3 ML: 15; .005 INJECTION, SOLUTION EPIDURAL; INFILTRATION; INTRACAUDAL; PERINEURAL at 02:04

## 2022-04-22 RX ADMIN — Medication 95 MILLI-UNITS/MIN: at 05:04

## 2022-04-22 RX ADMIN — ACETAMINOPHEN 1000 MG: 10 INJECTION INTRAVENOUS at 04:04

## 2022-04-22 RX ADMIN — FENTANYL CITRATE 100 MCG: 50 INJECTION, SOLUTION INTRAMUSCULAR; INTRAVENOUS at 04:04

## 2022-04-22 RX ADMIN — SODIUM CITRATE AND CITRIC ACID MONOHYDRATE 30 ML: 500; 334 SOLUTION ORAL at 04:04

## 2022-04-22 NOTE — PROGRESS NOTES
LABOR PROGRESS NOTE    MD to bedside for cervical check. Complaints: None.  Temp:  [96.4 °F (35.8 °C)-96.6 °F (35.9 °C)] 96.4 °F (35.8 °C)  Pulse:  [] 99  Resp:  [18] 18  SpO2:  [95 %-100 %] 98 %  BP: (125-156)/() 132/79    FHT: Category 1    CTX: tracing irregularly on toco      CVX at 12:49 AM   Dilation (cm): 4  Effacement (%): 70  Station: -2  Dose(units) Oxytocin: *12 juany-units/min     AROM clear     ASSESSMENT   26 y.o.  at 38w2d, latent    TIMELINE  : /-3; balloon and cytotec  0045: 3/60/-3; balloon out. Start pit  0430: /-2, AROM clear, pit @12    PLAN  1. Labor management  -Continue continuous maternal/fetal monitoring.   -Recheck 2-4 hours or PRN  -Pitocin per orders    Radha Harrell MD  OBGYN, PGY-1

## 2022-04-22 NOTE — PROGRESS NOTES
04/22/22 1042   TeleStork Rajan Note - Strip   Strip Reviewed by Rajan Nurse? Yes   TeleStork Rajan Note - Communication   [place] Nurse Communicated with Bedside Nurse Regarding: Contraction Pattern   TeleStork Rajan Note - Notification   Nurse Notified? Yes   Name of Nurse Freya

## 2022-04-22 NOTE — PROGRESS NOTES
Labor Progress Note        Subjective:      Patient currently doing well without complaints. Comfortable with epidural.     Objective:      Temp:  [96.4 °F (35.8 °C)-98 °F (36.7 °C)] 98 °F (36.7 °C)  Pulse:  [] 82  Resp:  [18] 18  SpO2:  [95 %-100 %] 97 %  BP: (125-156)/() 138/91  Body mass index is 36.47 kg/m².     General: no acute distress  Electronic Fetal Monitoring:  FHT: 120 bpm, moderate variability, accelerations absent, decelerations absent   Category: 1                 TOCO: Contractions: regular, every 1-2 minutes               Cervix:  60/-2     TIMELINE  : /-3; balloon and cytotec  0045: 3/60/-3; balloon out. Start pit  0430: 470/-2, AROM clear, pit @12  0800: 60/-2  11:45: /60/-2, Pit at 20, IUPC placed, caput noted    Assessment:     1.  at 38w2d here for induction of labor 2/2 cHTN  2. GBS negative   3. Category 1 FHT     Plan:     1. Continue active management of labor

## 2022-04-22 NOTE — TRANSFER OF CARE
"Anesthesia Transfer of Care Note    Patient: Eve Salazar    Procedure(s) Performed: Procedure(s) (LRB):   SECTION (N/A)    Patient location: Labor and Delivery    Anesthesia Type: epidural    Transport from OR: Transported from OR on room air with adequate spontaneous ventilation    Post pain: adequate analgesia    Post assessment: no apparent anesthetic complications and tolerated procedure well    Post vital signs: stable    Level of consciousness: awake, alert and oriented    Nausea/Vomiting: no nausea/vomiting    Complications: none    Transfer of care protocol was followed      Last vitals:   Visit Vitals  BP (!) 139/92   Pulse 101   Temp 36.7 °C (98 °F) (Oral)   Resp 18   Ht 5' 6" (1.676 m)   Wt 102.5 kg (225 lb 15.5 oz)   LMP 07/10/2021 (Approximate)   SpO2 100%   Breastfeeding No   BMI 36.47 kg/m²     "

## 2022-04-22 NOTE — PROGRESS NOTES
LABOR PROGRESS NOTE    MD to bedside for cervical check. Complaints: None.  Temp:  [96.6 °F (35.9 °C)] 96.6 °F (35.9 °C)  Pulse:  [] 75  Resp:  [18] 18  SpO2:  [95 %-99 %] 95 %  BP: (139-156)/() 142/91    FHT: Category 1    CTX: irreg     CVX at 12:49 AM   Dilation (cm): 3  Effacement (%): 70  Station: -3     ASSESSMENT   26 y.o.  at 38w2d, latent    TIMELINE  : -3; balloon and cytotec  0045: 3/60/-3; balloon out. Start pit    PLAN  1. Labor management  -Continue continuous maternal/fetal monitoring.   -Recheck 2-4 hours or PRN  -Pitocin per orders    PATRICK Jean-Baptiste MD  OBGYN PGY-3

## 2022-04-22 NOTE — ANESTHESIA PROCEDURE NOTES
Epidural    Patient location during procedure: OB   Reason for block: primary anesthetic   Reason for block: labor analgesia requested by patient and obstetrician  Diagnosis: iup   Start time: 4/22/2022 1:50 AM  Timeout: 4/22/2022 1:50 AM  End time: 4/22/2022 2:02 AM  Surgery related to: Vaginal Delivery    Staffing  Performing Provider: Davis Olivas MD  Authorizing Provider: Sachi Leggett MD        Preanesthetic Checklist  Completed: patient identified, IV checked, risks and benefits discussed, surgical consent, monitors and equipment checked, pre-op evaluation, timeout performed, anesthesia consent given, hand hygiene performed and patient being monitored  Preparation  Patient position: sitting  Prep: ChloraPrep  Patient monitoring: Pulse Ox  Reason for block: primary anesthetic   Epidural  Skin Anesthetic: lidocaine 1%  Skin Wheal: 3 mL  Administration type: continuous  Approach: midline  Interspace: L3-4    Injection technique: LYNNETTE air  Needle and Epidural Catheter  Needle type: Tuohy   Needle gauge: 17  Needle length: 3.5 inches  Needle insertion depth: 7 cm  Catheter type: springwound  Catheter size: 19 G  Catheter at skin depth: 11 cm  Insertion Attempts: 1  Test dose: 3 mL of lidocaine 1.5% with Epi 1-to-200,000  Additional Documentation: incremental injection, negative aspiration for heme and CSF, no paresthesia on injection, no signs/symptoms of IV or SA injection, no significant pain on injection and no significant complaints from patient  Needle localization: anatomical landmarks  Medications:  Volume per aspiration: 5 mL  Time between aspirations: 5 minutes   Assessment  Ease of block: easy  Patient's tolerance of the procedure: comfortable throughout block and no complaints No inadvertent dural puncture with Tuohy.  Dural puncture performed with spinal needle.

## 2022-04-22 NOTE — L&D DELIVERY NOTE
[facility] - Labor & Delivery   Section   Operative Note    SUMMARY     Date of Procedure: 2022     Procedure: Procedure(s) (LRB):   SECTION (N/A)    Surgeon(s) and Role:     * Laura Vasques MD - Primary     * Shannon Hannon MD - Resident - Assisting     * Tita Johnson MD - Resident - Assisting    Pre-Operative Diagnosis: Failure to Progress  Other (Add Comments)    Post-Operative Diagnosis: Post-Op Diagnosis Codes:     * Pregnant [Z34.90]    Anesthesia: Spinal/Epidural           Description of the Findings of the Procedure: Normal appearing uterus, bilateral tubes and ovaries. Asynclitic positioning of male infant.     Complications: No    Blood Loss: 770 cc     Indication:   26 y.o.  at 38w2d presented for IOL 2/2 cHTN. She was induced and progressed well to 5 cm. Remained at 5 cm despite AROM and pitocin for >8 hours. MVUs were not quite adequate, but patient, when counseled on options to continue IOL vs proceed with  opted for  delivery.    Procedure in detail:    The patient was taken to the operating room where epidural/spinal anesthesia was found to be adequate. She was then prepped and draped in the normal sterile fashion. A esposito catheter was in place prior to start of the surgery. Adequate anesthesia was then confirmed using allis clamps.    After a Time Out was performed, a scalpel was used to make a Pfannensteil incision. The bovie was used to carry down to the underlying layer of fascia. The fascia was then incised in the midline. The bovie was then used to extend the fascia incision laterally. The fascia was then elevated using the kocher clamps and extended both inferiorly and superiorly using the curved disla scissors. The rectus muscles were then  in the midline and the peritoneum was then entered and extended bluntly. The peritoneal opening did require further widening with Metzenbaum scissors. The bladder blade was then inserted and the  vesicouterine peritoneum was then entered sharply with the Metzenbaum scissors and extended laterally and the bladder flap was created digitally. The bladder blade was then reinserted.     The inside scalpel was then used to make a LOW TRANSVERSE incision in the uterus. This was extended bluntly. The amniotic fluid was noted to be Clear. The infant's head was delivered atraumatically followed by the remainder of the infant's body. The nose and mouth were suctioned with the bulb suction. The cord was clamped and cut and the baby was handed off to awaiting pediatric attendant. The placenta was then removed and the uterus was exteriorized and cleared of all clots and debris. The bladder blade was then reinserted.     The uterine incision was then closed using a #1 Vicryl in a running locked suture. A second imbricating suture of #1 Vicrylwas used to close a second layer. The hysterotomy was examined and good  hemostasis was noted. The abdomen was then irrigated and cleared of all clots and debris. The uterus was then returned to the abdomen. The bladder blade was replaced and the uterine incision was reexamined and noted to have excellent hemostasis.     The peritoneum and rectus muscles were then re approximated using 2-0 Vicryl in a running fashion. The rectus muscles were then irrigated and and bleeding areas were cauterized using the Bovie. When hemostasis was confirmed the fascia was then closed using #1 Vicryl in a running fashion. The subcutaneous tissue was judiciously cauterized as needed. 2-0 plain gut was then used to re approximate this layer. 4-0 Monocryl was then used to close the skin in a subcuticular fashion. The patient tolerated the procedure well. Sponge lap and needle counts were correct x 2.     Specimens:   Specimen (24h ago, onward)            None          Condition: Good    Disposition: PACU - hemodynamically stable.    Attestation: Good         Delivery Information for Curtis Prado  Martin    Birth information:  YOB: 2022   Time of birth: 5:00 PM   Sex: male   Head Delivery Date/Time: 2022  5:00 PM   Delivery type: , Low Transverse   Gestational Age: 38w2d    Delivery Providers    Delivering clinician: Laura Vasques MD   Provider Role    CHRISTIAN Greene RN Dawn W. Aucoin, RN             Measurements    Weight: 3910 g  Weight (lbs): 8 lb 9.9 oz  Length:          Apgars    Living status: Living  Apgars:  1 min.:  5 min.:  10 min.:  15 min.:  20 min.:    Skin color:  1  1       Heart rate:  2  2       Reflex irritability:  2  2       Muscle tone:  2  2       Respiratory effort:  2  2       Total:  9  9       Apgars assigned by: CHRISTIAN NG [facility]         Operative Delivery    Forceps attempted?: No  Vacuum extractor attempted?: No         Shoulder Dystocia    Shoulder dystocia present?: No           Presentation    Presentation: Vertex  Position: Middle Occiput Anterior           Interventions/Resuscitation    Method: Bulb Suctioning, Tactile Stimulation       Cord    Vessels: 3 vessels  Complications: None  Delayed Cord Clamping?: Yes  Cord Clamped Date/Time: 2022  5:01 PM  Cord Blood Disposition: Lab  Gases Sent?: No  Stem Cell Collection (by MD): No       Placenta    Placenta delivery date/time: 2022 1753  Placenta removal: Spontaneous  Placenta appearance: Intact  Placenta disposition: discarded           Labor Events:       labor: No     Labor Onset Date/Time:         Dilation Complete Date/Time:         Start Pushing Date/Time:         Start Pushing Date/Time:       Rupture Date/Time: 22  0430         Rupture type:          Fluid Amount:       Fluid Color: Clear      Fluid Odor:       Membrane Status: ARM (Artificial Rupture)               steroids: None     Antibiotics given for GBS: No     Induction: misoprostol     Indications for induction:  Hypertension     Augmentation:  oxytocin     Indications for augmentation: Hypertension     Labor complications: Failure to Progress in First Stage     Additional complications:          Cervical ripening:                     Delivery:      Episiotomy:       Indication for Episiotomy:       Perineal Lacerations:   Repaired:      Periurethral Laceration:   Repaired:     Labial Laceration:   Repaired:     Sulcus Laceration:   Repaired:     Vaginal Laceration:   Repaired:     Cervical Laceration:   Repaired:     Repair suture:       Repair # of packets:       Last Value - EBL - Nursing (mL):       Sum - EBL - Nursing (mL): 0     Last Value - EBL - Anesthesia (mL):      Calculated QBL (mL): 770      Vaginal Sweep Performed:       Surgicount Correct:         Other providers:       Anesthesia    Method: Spinal, Epidural          Details (if applicable):  Trial of Labor Yes    Categorization: Primary    Priority: Routine   Indications for : Failed induction   Incision Type: low transverse     Additional  information:  Forceps:    Vacuum:    Breech:    Observed anomalies    Other (Comments):

## 2022-04-22 NOTE — INTERVAL H&P NOTE
The patient has been examined and the H&P has been reviewed:    I concur with the findings and changes have been noted since the H&P was written:    Vertex  0.5/50/-3  Cat 1 NST  Cytotec and balloon  No lesion on spec exam      Active Hospital Problems    Diagnosis  POA    *Encounter for induction of labor [Z34.90]  Not Applicable      Resolved Hospital Problems   No resolved problems to display.     PATRICK Jean-Baptiste MD  OBGYN PGY-3

## 2022-04-22 NOTE — CARE UPDATE
Resident to bedside for esposito placement. ft/50/-3, esposito placed without difficulty.    Cytotec administered at 1940. Will recheck in 3 hours.    Radha Harrell MD  OBGYN, PGY-1

## 2022-04-23 PROBLEM — Z34.90 ENCOUNTER FOR INDUCTION OF LABOR: Status: RESOLVED | Noted: 2022-04-21 | Resolved: 2022-04-23

## 2022-04-23 LAB
BASOPHILS # BLD AUTO: 0.02 K/UL (ref 0–0.2)
BASOPHILS # BLD AUTO: 0.02 K/UL (ref 0–0.2)
BASOPHILS NFR BLD: 0.1 % (ref 0–1.9)
BASOPHILS NFR BLD: 0.1 % (ref 0–1.9)
DIFFERENTIAL METHOD: ABNORMAL
DIFFERENTIAL METHOD: ABNORMAL
EOSINOPHIL # BLD AUTO: 0 K/UL (ref 0–0.5)
EOSINOPHIL # BLD AUTO: 0 K/UL (ref 0–0.5)
EOSINOPHIL NFR BLD: 0.1 % (ref 0–8)
EOSINOPHIL NFR BLD: 0.1 % (ref 0–8)
ERYTHROCYTE [DISTWIDTH] IN BLOOD BY AUTOMATED COUNT: 13.4 % (ref 11.5–14.5)
ERYTHROCYTE [DISTWIDTH] IN BLOOD BY AUTOMATED COUNT: 13.4 % (ref 11.5–14.5)
HCT VFR BLD AUTO: 29.3 % (ref 37–48.5)
HCT VFR BLD AUTO: 29.3 % (ref 37–48.5)
HGB BLD-MCNC: 9.9 G/DL (ref 12–16)
HGB BLD-MCNC: 9.9 G/DL (ref 12–16)
IMM GRANULOCYTES # BLD AUTO: 0.08 K/UL (ref 0–0.04)
IMM GRANULOCYTES # BLD AUTO: 0.08 K/UL (ref 0–0.04)
IMM GRANULOCYTES NFR BLD AUTO: 0.5 % (ref 0–0.5)
IMM GRANULOCYTES NFR BLD AUTO: 0.5 % (ref 0–0.5)
LYMPHOCYTES # BLD AUTO: 1.8 K/UL (ref 1–4.8)
LYMPHOCYTES # BLD AUTO: 1.8 K/UL (ref 1–4.8)
LYMPHOCYTES NFR BLD: 10.9 % (ref 18–48)
LYMPHOCYTES NFR BLD: 10.9 % (ref 18–48)
MCH RBC QN AUTO: 31.4 PG (ref 27–31)
MCH RBC QN AUTO: 31.4 PG (ref 27–31)
MCHC RBC AUTO-ENTMCNC: 33.8 G/DL (ref 32–36)
MCHC RBC AUTO-ENTMCNC: 33.8 G/DL (ref 32–36)
MCV RBC AUTO: 93 FL (ref 82–98)
MCV RBC AUTO: 93 FL (ref 82–98)
MONOCYTES # BLD AUTO: 1.3 K/UL (ref 0.3–1)
MONOCYTES # BLD AUTO: 1.3 K/UL (ref 0.3–1)
MONOCYTES NFR BLD: 7.7 % (ref 4–15)
MONOCYTES NFR BLD: 7.7 % (ref 4–15)
NEUTROPHILS # BLD AUTO: 13.6 K/UL (ref 1.8–7.7)
NEUTROPHILS # BLD AUTO: 13.6 K/UL (ref 1.8–7.7)
NEUTROPHILS NFR BLD: 80.7 % (ref 38–73)
NEUTROPHILS NFR BLD: 80.7 % (ref 38–73)
NRBC BLD-RTO: 0 /100 WBC
NRBC BLD-RTO: 0 /100 WBC
PLATELET # BLD AUTO: 203 K/UL (ref 150–450)
PLATELET # BLD AUTO: 203 K/UL (ref 150–450)
PMV BLD AUTO: 11.1 FL (ref 9.2–12.9)
PMV BLD AUTO: 11.1 FL (ref 9.2–12.9)
RBC # BLD AUTO: 3.15 M/UL (ref 4–5.4)
RBC # BLD AUTO: 3.15 M/UL (ref 4–5.4)
WBC # BLD AUTO: 16.81 K/UL (ref 3.9–12.7)
WBC # BLD AUTO: 16.81 K/UL (ref 3.9–12.7)

## 2022-04-23 PROCEDURE — 99024 POSTOP FOLLOW-UP VISIT: CPT | Mod: ,,, | Performed by: OBSTETRICS & GYNECOLOGY

## 2022-04-23 PROCEDURE — 85025 COMPLETE CBC W/AUTO DIFF WBC: CPT | Performed by: STUDENT IN AN ORGANIZED HEALTH CARE EDUCATION/TRAINING PROGRAM

## 2022-04-23 PROCEDURE — 25000003 PHARM REV CODE 250

## 2022-04-23 PROCEDURE — 99024 PR POST-OP FOLLOW-UP VISIT: ICD-10-PCS | Mod: ,,, | Performed by: OBSTETRICS & GYNECOLOGY

## 2022-04-23 PROCEDURE — 36415 COLL VENOUS BLD VENIPUNCTURE: CPT | Performed by: STUDENT IN AN ORGANIZED HEALTH CARE EDUCATION/TRAINING PROGRAM

## 2022-04-23 PROCEDURE — 63600175 PHARM REV CODE 636 W HCPCS: Performed by: STUDENT IN AN ORGANIZED HEALTH CARE EDUCATION/TRAINING PROGRAM

## 2022-04-23 PROCEDURE — 25000003 PHARM REV CODE 250: Performed by: ADVANCED PRACTICE MIDWIFE

## 2022-04-23 PROCEDURE — 11000001 HC ACUTE MED/SURG PRIVATE ROOM

## 2022-04-23 PROCEDURE — 25000003 PHARM REV CODE 250: Performed by: OBSTETRICS & GYNECOLOGY

## 2022-04-23 PROCEDURE — 25000003 PHARM REV CODE 250: Performed by: STUDENT IN AN ORGANIZED HEALTH CARE EDUCATION/TRAINING PROGRAM

## 2022-04-23 RX ORDER — DOCUSATE SODIUM 100 MG/1
200 CAPSULE, LIQUID FILLED ORAL 2 TIMES DAILY
Qty: 60 CAPSULE | Refills: 0 | Status: SHIPPED | OUTPATIENT
Start: 2022-04-23 | End: 2022-09-16

## 2022-04-23 RX ORDER — IBUPROFEN 600 MG/1
600 TABLET ORAL EVERY 6 HOURS PRN
Status: DISCONTINUED | OUTPATIENT
Start: 2022-04-23 | End: 2022-04-24 | Stop reason: HOSPADM

## 2022-04-23 RX ORDER — VALACYCLOVIR HYDROCHLORIDE 500 MG/1
500 TABLET, FILM COATED ORAL 2 TIMES DAILY
Status: DISCONTINUED | OUTPATIENT
Start: 2022-04-23 | End: 2022-04-24 | Stop reason: HOSPADM

## 2022-04-23 RX ORDER — OXYCODONE AND ACETAMINOPHEN 5; 325 MG/1; MG/1
1 TABLET ORAL EVERY 4 HOURS PRN
Qty: 20 TABLET | Refills: 0 | Status: SHIPPED | OUTPATIENT
Start: 2022-04-23 | End: 2022-09-16

## 2022-04-23 RX ORDER — IBUPROFEN 800 MG/1
800 TABLET ORAL EVERY 8 HOURS PRN
Qty: 30 TABLET | Refills: 1 | Status: SHIPPED | OUTPATIENT
Start: 2022-04-23 | End: 2022-05-11

## 2022-04-23 RX ADMIN — DOCUSATE SODIUM 200 MG: 100 CAPSULE, LIQUID FILLED ORAL at 09:04

## 2022-04-23 RX ADMIN — ACETAMINOPHEN 650 MG: 325 TABLET ORAL at 11:04

## 2022-04-23 RX ADMIN — ACETAMINOPHEN 650 MG: 325 TABLET ORAL at 05:04

## 2022-04-23 RX ADMIN — KETOROLAC TROMETHAMINE 30 MG: 30 INJECTION, SOLUTION INTRAMUSCULAR at 05:04

## 2022-04-23 RX ADMIN — KETOROLAC TROMETHAMINE 30 MG: 30 INJECTION, SOLUTION INTRAMUSCULAR at 11:04

## 2022-04-23 RX ADMIN — NIFEDIPINE 30 MG: 30 TABLET, FILM COATED, EXTENDED RELEASE ORAL at 09:04

## 2022-04-23 RX ADMIN — VALACYCLOVIR HYDROCHLORIDE 500 MG: 500 TABLET, FILM COATED ORAL at 10:04

## 2022-04-23 RX ADMIN — DOCUSATE SODIUM 200 MG: 100 CAPSULE, LIQUID FILLED ORAL at 10:04

## 2022-04-23 RX ADMIN — KETOROLAC TROMETHAMINE 30 MG: 30 INJECTION, SOLUTION INTRAMUSCULAR at 12:04

## 2022-04-23 RX ADMIN — NIFEDIPINE 30 MG: 30 TABLET, FILM COATED, EXTENDED RELEASE ORAL at 10:04

## 2022-04-23 RX ADMIN — VALACYCLOVIR HYDROCHLORIDE 500 MG: 500 TABLET, FILM COATED ORAL at 11:04

## 2022-04-23 RX ADMIN — ACETAMINOPHEN 650 MG: 325 TABLET ORAL at 12:04

## 2022-04-23 RX ADMIN — IBUPROFEN 600 MG: 600 TABLET ORAL at 11:04

## 2022-04-23 NOTE — SUBJECTIVE & OBJECTIVE
Interval History: LEMUEL    She is doing well this morning. She is tolerating a regular diet without nausea or vomiting. She is voiding spontaneously. She is ambulating. She has passed flatus, and has not a BM. Vaginal bleeding is mild. She denies fever or chills. Abdominal pain is mild and controlled with oral medications. She Is breastfeeding. She desires circumcision for her male baby: yes.    Objective:     Vital Signs (Most Recent):  Temp: 98 °F (36.7 °C) (04/23/22 0913)  Pulse: 93 (04/23/22 0913)  Resp: 18 (04/23/22 0913)  BP: (!) 140/87 (04/23/22 0913)  SpO2: 99 % (04/23/22 0913)   Vital Signs (24h Range):  Temp:  [98 °F (36.7 °C)-98.9 °F (37.2 °C)] 98 °F (36.7 °C)  Pulse:  [] 93  Resp:  [12-18] 18  SpO2:  [95 %-100 %] 99 %  BP: (122-164)/(60-99) 140/87     Weight: 102.5 kg (225 lb 15.5 oz)  Body mass index is 36.47 kg/m².      Intake/Output Summary (Last 24 hours) at 4/23/2022 1023  Last data filed at 4/23/2022 0515  Gross per 24 hour   Intake 908.46 ml   Output 5170 ml   Net -4261.54 ml         Significant Labs:  Lab Results   Component Value Date    GROUPTRH O POS 04/21/2022    HEPBSAG Negative 09/27/2021    STREPBCULT No Group B Streptococcus isolated 04/01/2022     Recent Labs   Lab 04/23/22  0437   HGB 9.9*  9.9*   HCT 29.3*  29.3*     Recent Results (from the past 336 hour(s))   CBC Auto Differential    Collection Time: 04/23/22  4:37 AM   Result Value Ref Range    WBC 16.81 (H) 3.90 - 12.70 K/uL    Hemoglobin 9.9 (L) 12.0 - 16.0 g/dL    Hematocrit 29.3 (L) 37.0 - 48.5 %    Platelets 203 150 - 450 K/uL   CBC auto differential    Collection Time: 04/23/22  4:37 AM   Result Value Ref Range    WBC 16.81 (H) 3.90 - 12.70 K/uL    Hemoglobin 9.9 (L) 12.0 - 16.0 g/dL    Hematocrit 29.3 (L) 37.0 - 48.5 %    Platelets 203 150 - 450 K/uL   CBC auto differential    Collection Time: 04/21/22  6:22 PM   Result Value Ref Range    WBC 9.75 3.90 - 12.70 K/uL    Hemoglobin 11.6 (L) 12.0 - 16.0 g/dL    Hematocrit  34.4 (L) 37.0 - 48.5 %    Platelets 217 150 - 450 K/uL         I have personallly reviewed all pertinent lab results from the last 24 hours.    Physical Exam:   Constitutional: She appears well-developed and well-nourished. No distress.       Cardiovascular:  Intact distal pulses.             Pulmonary/Chest: Effort normal. Tenderness: Dressing with scant amount of dried blood present on the outside (coming from lochia, not drainage from incision).        Abdominal: Soft. She exhibits no distension. There is abdominal tenderness (Appropriate post-op tenderness).             Musculoskeletal: No edema.       Neurological: She is alert.    Skin: Skin is warm and dry.

## 2022-04-23 NOTE — LACTATION NOTE
Infant sleepy, reluctant to latch, HE drops into mouth. Provided assistance with HE, 1.5mL achieved, stored in syringe at bedside. Encouraged max skin to skin. Support and encouragement provided.     Lactation Basics education completed. LC reviewed Breastfeeding Guide and encouraged tracking feeds and output. Encouraged use of STS, frequent feeds on demand, and avoiding artificial nipples. Pt verbalized understanding and questions answered. Pt aware to call  for assistance with feeding.        04/23/22 1200   Maternal Assessment   Breast Shape pendulous   Breast Density soft   Areola elastic   Nipples graspable;short   Maternal Infant Feeding   Maternal Emotional State assist needed   Nipple Shape After Feeding, Right   (HE drops into mouth)

## 2022-04-23 NOTE — PLAN OF CARE
Patient safety maintained, side rails up, bed low and locked position. Pt ambulating and voiding independently.  Pain well controlled with PRN pain medication. Fundus midline, firm, with moderate  lochia. Patient responding to infant cues.  Dressing dry, and intact.  Significant other at bedside and assisting in patient's care. Will continue to monitor.

## 2022-04-23 NOTE — HPI
26 y.o.  presented to L&D for IOL secondary to cHTN (on Procardia 30 mg BID at time of admission) at 38w3d

## 2022-04-23 NOTE — ED NOTES
LS POD#0 s/p pLTCS    H/H Pre: 12/34  (QBL ? mL)  Post: p  Dx: cHTN    BC: Schex  Meds: ibuprofen, norco, no PP Lovenox  MALE [ ] circ consent (check media!)  [ ] orders [ ] done

## 2022-04-23 NOTE — PROGRESS NOTES
[facility])  Obstetrics  Postpartum Progress Note    Patient Name: Eve Salazar  MRN: [id number]  Admission Date: 2022  Hospital Length of Stay: 2 days  Attending Physician: Laura Vasques, *  Primary Care Provider: Primary Doctor No    Subjective:     Principal Problem:S/P  section    Hospital Course:  HD1 - IOL started with cytotec and cervical ripening balloon.   HD2 - Pt progressed to 5 cm with the aid of pitocin and AROM, but stayed at this cervical dilation for > hours. She was counseled on continued IOL vs C/S and opted for C/S.   HD3/POD1 - Pt doing well.      Interval History: NAEON    She is doing well this morning. She is tolerating a regular diet without nausea or vomiting. She is voiding spontaneously. She is ambulating. She has passed flatus, and has not a BM. Vaginal bleeding is mild. She denies fever or chills. Abdominal pain is mild and controlled with oral medications. She Is breastfeeding. She desires circumcision for her male baby: yes.    Objective:     Vital Signs (Most Recent):  Temp: 98 °F (36.7 °C) (22)  Pulse: 93 (22)  Resp: 18 (22)  BP: (!) 140/87 (22)  SpO2: 99 % (22)   Vital Signs (24h Range):  Temp:  [98 °F (36.7 °C)-98.9 °F (37.2 °C)] 98 °F (36.7 °C)  Pulse:  [] 93  Resp:  [12-18] 18  SpO2:  [95 %-100 %] 99 %  BP: (122-164)/(60-99) 140/87     Weight: 102.5 kg (225 lb 15.5 oz)  Body mass index is 36.47 kg/m².      Intake/Output Summary (Last 24 hours) at 2022 1023  Last data filed at 2022 0515  Gross per 24 hour   Intake 908.46 ml   Output 5170 ml   Net -4261.54 ml         Significant Labs:  Lab Results   Component Value Date    GROUPTRH O POS 2022    HEPBSAG Negative 2021    STREPBCULT No Group B Streptococcus isolated 2022     Recent Labs   Lab 22  0437   HGB 9.9*  9.9*   HCT 29.3*  29.3*     Recent Results (from the past 336 hour(s))   CBC Auto  Differential    Collection Time: 22  4:37 AM   Result Value Ref Range    WBC 16.81 (H) 3.90 - 12.70 K/uL    Hemoglobin 9.9 (L) 12.0 - 16.0 g/dL    Hematocrit 29.3 (L) 37.0 - 48.5 %    Platelets 203 150 - 450 K/uL   CBC auto differential    Collection Time: 22  4:37 AM   Result Value Ref Range    WBC 16.81 (H) 3.90 - 12.70 K/uL    Hemoglobin 9.9 (L) 12.0 - 16.0 g/dL    Hematocrit 29.3 (L) 37.0 - 48.5 %    Platelets 203 150 - 450 K/uL   CBC auto differential    Collection Time: 22  6:22 PM   Result Value Ref Range    WBC 9.75 3.90 - 12.70 K/uL    Hemoglobin 11.6 (L) 12.0 - 16.0 g/dL    Hematocrit 34.4 (L) 37.0 - 48.5 %    Platelets 217 150 - 450 K/uL         I have personallly reviewed all pertinent lab results from the last 24 hours.    Physical Exam:   Constitutional: She appears well-developed and well-nourished. No distress.       Cardiovascular:  Intact distal pulses.             Pulmonary/Chest: Effort normal. Tenderness: Dressing with scant amount of dried blood present on the outside (coming from lochia, not drainage from incision).        Abdominal: Soft. She exhibits no distension. There is abdominal tenderness (Appropriate post-op tenderness).             Musculoskeletal: No edema.       Neurological: She is alert.    Skin: Skin is warm and dry.      Assessment/Plan:     26 y.o. female  for:    * S/P  section  Doing well, as continues to meet milestones will plan for circumcision of  as per parental request and plan for discharge to home tomorrow.    HSV (herpes simplex virus) anogenital infection  Increased daily valtex 500 mg to BID for suppression in the post partum period        Disposition: As patient meets milestones, will plan to discharge to home tomorrow.    Laura Vasques MD  Obstetrics  [facility] ([place])

## 2022-04-23 NOTE — ANESTHESIA POSTPROCEDURE EVALUATION
Anesthesia Post Evaluation    Patient: Eve Salazar    Procedure(s) Performed: Procedure(s) (LRB):   SECTION (N/A)    Final Anesthesia Type: epidural      Patient location during evaluation: med/surg floor  Patient participation: Yes- Able to Participate  Level of consciousness: awake and alert and oriented  Post-procedure vital signs: reviewed and stable  Pain management: adequate  Airway patency: patent  PEDRITO mitigation strategies: Multimodal analgesia and Use of major conduction anesthesia (spinal/epidural) or peripheral nerve block  PONV status at discharge: No PONV  Anesthetic complications: no      Cardiovascular status: blood pressure returned to baseline and hemodynamically stable  Respiratory status: unassisted, spontaneous ventilation and room air  Hydration status: euvolemic  Follow-up not needed.          Vitals Value Taken Time   /84 22 1305   Temp 36.6 °C (97.8 °F) 22 1305   Pulse 98 22 1305   Resp 18 22 1305   SpO2 98 % 22 1305         No case tracking events are documented in the log.      Pain/Tita Score: Pain Rating Prior to Med Admin: 3 (2022 11:12 AM)  Pain Rating Post Med Admin: 2 (2022  6:13 AM)

## 2022-04-23 NOTE — HOSPITAL COURSE
HD1 - IOL started with cytotec and cervical ripening balloon.   HD2 - Pt progressed to 5 cm with the aid of pitocin and AROM, but stayed at this cervical dilation for > hours. She was counseled on continued IOL vs C/S and opted for C/S.   HD3/POD1 - Pt doing well.  HD4/POD2 - Meeting all milestones.  to be circumcised today and then will discharge to home.

## 2022-04-23 NOTE — ASSESSMENT & PLAN NOTE
Doing well, as continues to meet milestones will plan for circumcision of  as per parental request and plan for discharge to home tomorrow.

## 2022-04-24 VITALS
DIASTOLIC BLOOD PRESSURE: 77 MMHG | OXYGEN SATURATION: 100 % | HEART RATE: 87 BPM | BODY MASS INDEX: 36.32 KG/M2 | HEIGHT: 66 IN | SYSTOLIC BLOOD PRESSURE: 134 MMHG | RESPIRATION RATE: 18 BRPM | TEMPERATURE: 99 F | WEIGHT: 226 LBS

## 2022-04-24 PROCEDURE — 99024 POSTOP FOLLOW-UP VISIT: CPT | Mod: ,,, | Performed by: OBSTETRICS & GYNECOLOGY

## 2022-04-24 PROCEDURE — 25000003 PHARM REV CODE 250: Performed by: OBSTETRICS & GYNECOLOGY

## 2022-04-24 PROCEDURE — 25000003 PHARM REV CODE 250: Performed by: STUDENT IN AN ORGANIZED HEALTH CARE EDUCATION/TRAINING PROGRAM

## 2022-04-24 PROCEDURE — 99024 PR POST-OP FOLLOW-UP VISIT: ICD-10-PCS | Mod: ,,, | Performed by: OBSTETRICS & GYNECOLOGY

## 2022-04-24 PROCEDURE — 25000003 PHARM REV CODE 250

## 2022-04-24 RX ADMIN — IBUPROFEN 600 MG: 600 TABLET ORAL at 02:04

## 2022-04-24 RX ADMIN — IBUPROFEN 600 MG: 600 TABLET ORAL at 05:04

## 2022-04-24 RX ADMIN — DOCUSATE SODIUM 200 MG: 100 CAPSULE, LIQUID FILLED ORAL at 08:04

## 2022-04-24 RX ADMIN — NIFEDIPINE 30 MG: 30 TABLET, FILM COATED, EXTENDED RELEASE ORAL at 08:04

## 2022-04-24 RX ADMIN — VALACYCLOVIR HYDROCHLORIDE 500 MG: 500 TABLET, FILM COATED ORAL at 08:04

## 2022-04-24 NOTE — DISCHARGE SUMMARY
[facility] ([place])  Obstetrics  Discharge Summary      Patient Name: Eve Salazar  MRN: [id number]  Admission Date: 2022  Hospital Length of Stay: 3 days  Discharge Date and Time:  2022 10:15 AM  Attending Physician: Laura Vasques, *   Discharging Provider: Laura Vasques MD   Primary Care Provider: Primary Doctor No    HPI: 26 y.o.  presented to L&D for IOL secondary to cHTN (on Procardia 30 mg BID at time of admission) at 38w3d     Procedure(s) (LRB):   SECTION (N/A)     Hospital Course:   HD1 - IOL started with cytotec and cervical ripening balloon.   HD2 - Pt progressed to 5 cm with the aid of pitocin and AROM, but stayed at this cervical dilation for > hours. She was counseled on continued IOL vs C/S and opted for C/S.   HD3/POD1 - Pt doing well.  HD4/POD2 - Meeting all milestones.  to be circumcised today and then will discharge to home.        Consults (From admission, onward)        Status Ordering Provider     Consult to Lactation  Use PRN      Provider:  (Not yet assigned)    JESS Mejía          Final Active Diagnoses:    Diagnosis Date Noted POA    PRINCIPAL PROBLEM:  S/P  section [Z98.891] 2022 Not Applicable    HSV (herpes simplex virus) anogenital infection [A60.9]  Yes      Problems Resolved During this Admission:    Diagnosis Date Noted Date Resolved POA    Encounter for induction of labor [Z34.90] 2022 Not Applicable        Significant Diagnostic Studies: Labs:   CBC   Recent Labs   Lab 22  0437   WBC 16.81*  16.81*   HGB 9.9*  9.9*   HCT 29.3*  29.3*     203         Feeding Method: breast    Immunizations     Date Immunization Status Dose Route/Site Given by    22 0750 MMR Deferred (Other) 0.5 mL Subcutaneous/ Chaparrita Wan RN    22 0600 Tdap Deferred (Other) 0.5 mL Intramuscular/ Chaparrita Wan RN          Delivery:    Episiotomy:     Lacerations:      Repair suture:     Repair # of packets:     Blood loss (ml):       Birth information:  YOB: 2022   Time of birth: 5:00 PM   Sex: male   Delivery type: , Low Transverse   Gestational Age: 38w2d    Delivery Clinician:      Other providers:       Additional  information:  Forceps:    Vacuum:    Breech:    Observed anomalies      Living?:           APGARS  One minute Five minutes Ten minutes   Skin color:         Heart rate:         Grimace:         Muscle tone:         Breathing:         Totals: 9  9        Placenta: Delivered:       appearance    Pending Diagnostic Studies:     None          Discharged Condition: good    Disposition: Home or Self Care    Follow Up:   Follow-up Information     Laura Vasques MD Follow up in 2 week(s).    Specialty: Obstetrics and Gynecology  Why: For Routine Post Operative Care  Contact information:  [address]  [address]  [place] [address] [phone]                       Patient Instructions:   No discharge procedures on file.  Medications:  Current Discharge Medication List      START taking these medications    Details   docusate sodium (COLACE) 100 MG capsule Take 2 capsules (200 mg total) by mouth 2 (two) times daily.  Qty: 60 capsule, Refills: 0      ibuprofen (ADVIL,MOTRIN) 800 MG tablet Take 1 tablet (800 mg total) by mouth every 8 (eight) hours as needed for Pain.  Qty: 30 tablet, Refills: 1      oxyCODONE-acetaminophen (PERCOCET) 5-325 mg per tablet Take 1 tablet by mouth every 4 (four) hours as needed for Pain.  Qty: 20 tablet, Refills: 0    Comments: Quantity prescribed more than 7 day supply? No         CONTINUE these medications which have NOT CHANGED    Details   aspirin 81 MG Chew Take 1 tablet (81 mg total) by mouth once daily.  Qty: 30 tablet, Refills: 12      NIFEdipine (PROCARDIA-XL) 30 MG (OSM) 24 hr tablet Take 1 tablet (30 mg total) by mouth 2 (two) times a day.  Qty: 180 tablet, Refills: 3    Comments: .       prenatal vit no.124/iron/folic (PRENATAL VITAMIN ORAL) Take by mouth.      valACYclovir (VALTREX) 500 MG tablet TAKE 1 TABLET BY MOUTH EVERY DAY  Qty: 30 tablet, Refills: 12             Laura Vasques MD  Obstetrics  [facility] ([place])

## 2022-04-24 NOTE — SUBJECTIVE & OBJECTIVE
Interval History: LEMUEL    She is doing well this morning. She is tolerating a regular diet without nausea or vomiting. She is voiding spontaneously. She is ambulating. She has passed flatus, and has not a BM. Vaginal bleeding is mild. She denies fever or chills. Abdominal pain is mild and controlled with oral medications. She Is breastfeeding. She desires circumcision for her male baby: yes.    Objective:     Vital Signs (Most Recent):  Temp: 97.9 °F (36.6 °C) (04/24/22 0312)  Pulse: 81 (04/24/22 0312)  Resp: 18 (04/24/22 0312)  BP: 112/65 (04/24/22 0312)  SpO2: 99 % (04/24/22 0312) Vital Signs (24h Range):  Temp:  [97.8 °F (36.6 °C)-98.5 °F (36.9 °C)] 97.9 °F (36.6 °C)  Pulse:  [] 81  Resp:  [16-18] 18  SpO2:  [97 %-100 %] 99 %  BP: (112-140)/(60-87) 112/65     Weight: 102.5 kg (225 lb 15.5 oz)  Body mass index is 36.47 kg/m².      Intake/Output Summary (Last 24 hours) at 4/24/2022 0900  Last data filed at 4/23/2022 1318  Gross per 24 hour   Intake --   Output 700 ml   Net -700 ml         Significant Labs:  Lab Results   Component Value Date    GROUPTRH O POS 04/21/2022    HEPBSAG Negative 09/27/2021    STREPBCULT No Group B Streptococcus isolated 04/01/2022     Recent Labs   Lab 04/23/22  0437   HGB 9.9*  9.9*   HCT 29.3*  29.3*       I have personallly reviewed all pertinent lab results from the last 24 hours.    Physical Exam:   Constitutional: She appears well-developed and well-nourished. No distress.    HENT:   Head: Normocephalic and atraumatic.      Cardiovascular:  Intact distal pulses.             Pulmonary/Chest: Effort normal.        Abdominal: Soft. She exhibits no distension. Abdominal incision: Well approximated with no evidence of breakdown or infection.There is no abdominal tenderness.             Musculoskeletal: No edema.       Neurological: She is alert.    Skin: Skin is warm and dry.    Psychiatric: Her behavior is normal.

## 2022-04-24 NOTE — PROGRESS NOTES
[facility] ([facility]  Obstetrics  Postpartum Progress Note    Patient Name: Eve Salazar  MRN: [id number]  Admission Date: 2022  Hospital Length of Stay: 3 days  Attending Physician: Laura Vasques, *  Primary Care Provider: Primary Doctor No    Subjective:     Principal Problem:S/P  section    Hospital Course:  HD1 - IOL started with cytotec and cervical ripening balloon.   HD2 - Pt progressed to 5 cm with the aid of pitocin and AROM, but stayed at this cervical dilation for > hours. She was counseled on continued IOL vs C/S and opted for C/S.   HD3/POD1 - Pt doing well.  HD4/POD2 - Meeting all milestones.  to be circumcised today and then will discharge to home.       No new subjective & objective note has been filed under this hospital service since the last note was generated.    Assessment/Plan:     26 y.o. female  for:    * S/P  section  Doing well, as continues to meet milestones will plan for circumcision of  as per parental request and plan for discharge to home today.    HSV (herpes simplex virus) anogenital infection  Increased daily valtex 500 mg to BID for suppression in the post partum period        Disposition: As patient meets milestones, will plan to discharge to home today.    Laura Vasques MD  Obstetrics  [facility]  [facility] ([facility]

## 2022-04-24 NOTE — ASSESSMENT & PLAN NOTE
Doing well, as continues to meet milestones will plan for circumcision of  as per parental request and plan for discharge to home today.

## 2022-04-24 NOTE — PLAN OF CARE
Pt voiding and ambulating without difficulty. Pt encouraged to drink fluids. Pt verbalizes understanding. VSS. Pain controlled scheduled medications. Incision dry and intact. Edges approximated. Light lochia. Fundus firm. Mom attentive to baby's needs. Pt does not have any questions or concerns @ this time. Will continue to monitor.

## 2022-04-24 NOTE — LACTATION NOTE
04/24/22 1300   Maternal Assessment   Breast Shape pendulous   Breast Density soft   Areola dense   Nipples graspable   Maternal Infant Feeding   Maternal Emotional State assist needed;relaxed   Infant Positioning cross-cradle   Latch Assistance yes   Breast Pumping   Breast Pumping hand expression utilized   Lactation Referrals   Lactation Referrals outpatient lactation program     Situation: continuity of lactation care, breastfeeding, episode of difficult to latch when baby is reluctant and sleepy    Background: day 2 postpartum, baby was post circumcision today    Assessment:   Pt Mom- plans to breastfeed  Pt Baby- briefly cueing, needs arousal    Actions:   1.  Reviewed basics of breastfeeding and managing breastfeeding difficulties, taught hand expression, feed baby expressed milk if latch is not efficient.   2.  Latch assistance and observation done.  Last feeding at 0840H, fed well as per pt before circumcission, just provided some expressed colostrum about 4ml via cup and finger feeding to help with sucking.   3.  Support provided and encouraged to call LC as needed  4.  Discussed discharge plans. Encouraged frequent STS. Encouraged to follow up closely with peds.    Results: pt agrees to the plan of feeding LC number on board, pt to call.

## 2022-04-25 ENCOUNTER — TELEPHONE (OUTPATIENT)
Dept: OBSTETRICS AND GYNECOLOGY | Facility: CLINIC | Age: 27
End: 2022-04-25
Payer: OTHER GOVERNMENT

## 2022-05-06 ENCOUNTER — POSTPARTUM VISIT (OUTPATIENT)
Dept: OBSTETRICS AND GYNECOLOGY | Facility: CLINIC | Age: 27
End: 2022-05-06
Payer: OTHER GOVERNMENT

## 2022-05-06 VITALS
SYSTOLIC BLOOD PRESSURE: 118 MMHG | WEIGHT: 198 LBS | BODY MASS INDEX: 31.82 KG/M2 | DIASTOLIC BLOOD PRESSURE: 70 MMHG | HEIGHT: 66 IN

## 2022-05-06 DIAGNOSIS — Z48.89 ENCOUNTER FOR POSTOPERATIVE WOUND CHECK: ICD-10-CM

## 2022-05-06 DIAGNOSIS — Z01.30 BLOOD PRESSURE CHECK: ICD-10-CM

## 2022-05-06 PROCEDURE — 99999 PR PBB SHADOW E&M-EST. PATIENT-LVL III: ICD-10-PCS | Mod: PBBFAC,,, | Performed by: NURSE PRACTITIONER

## 2022-05-06 PROCEDURE — 0502F PR SUBSEQUENT PRENATAL CARE: ICD-10-PCS | Mod: S$GLB,,, | Performed by: NURSE PRACTITIONER

## 2022-05-06 PROCEDURE — 0502F SUBSEQUENT PRENATAL CARE: CPT | Mod: S$GLB,,, | Performed by: NURSE PRACTITIONER

## 2022-05-06 PROCEDURE — 99999 PR PBB SHADOW E&M-EST. PATIENT-LVL III: CPT | Mod: PBBFAC,,, | Performed by: NURSE PRACTITIONER

## 2022-05-06 NOTE — PROGRESS NOTES
Postpartum Visit  Eve Salazar is a 26 y.o. female  is here for a postpartum visit. She is 2 weeks postpartum following a low cervical transverse  section, of a male infant weighinlb 9oz, with Anesthesia: spinal. . The delivery was at 38w 2d. Occaosional non compliance with Procardia, denies preE symptoms    Pregnancy was complicated by: essential hypertension.      OB History    Para Term  AB Living   1 1 1 0 0 1   SAB IAB Ectopic Multiple Live Births   0 0 0 0 1      # Outcome Date GA Lbr Klever/2nd Weight Sex Delivery Anes PTL Lv   1 Term 22 38w2d  3.91 kg (8 lb 9.9 oz) M CS-LTranv Spinal, EPI N BUTCH      Complications: Failure to Progress in First Stage       Postpartum course has been uncomplicated.    Postpartum depression screening: negative.  Baby's course has been uncomplicated. Baby is feeding by breast.     ROS:  GENERAL: No fever, chills, fatigability.  VULVAR: No pain, no lesions and no itching.  VAGINAL: No relaxation, no itching, no discharge, no abnormal bleeding and no lesions.  ABDOMEN: No abdominal pain. Denies nausea. Denies vomiting. No diarrhea. No constipation  BREAST: Denies pain. No lumps. No discharge.  URINARY: No incontinence, no nocturia, no frequency and no dysuria.  CARDIOVASCULAR: No chest pain. No shortness of breath. No leg cramps.  NEUROLOGICAL: No headaches. No vision changes.      General appearance - alert, well appearing, and in no distress  Mental status - normal mood, behavior, speech, dress, motor activity, and thought processes  Skin - coloration normal for race, good turgor, warm to touch, no rashes  Abdomen - soft, nontender, nondistended, no masses or organomegaly  7 cm Pfannenstiel incision: Clean, dry, intact - healing well.      Eve was seen today for postpartum care.    Diagnoses and all orders for this visit:    Encounter for postpartum care of lactating mother    Blood pressure check    Encounter for postoperative wound  check      Site healing without issue. Blood pressure is WNL, asymptomatic. PreE precautions. Will continue Procardia, recommended daily BP check.    Routine follow up in 4 weeks for PP visit with OBGYN.      SOPHIE Jones

## 2022-06-06 ENCOUNTER — POSTPARTUM VISIT (OUTPATIENT)
Dept: OBSTETRICS AND GYNECOLOGY | Facility: CLINIC | Age: 27
End: 2022-06-06
Payer: OTHER GOVERNMENT

## 2022-06-06 VITALS
WEIGHT: 194.88 LBS | HEIGHT: 66 IN | DIASTOLIC BLOOD PRESSURE: 86 MMHG | SYSTOLIC BLOOD PRESSURE: 148 MMHG | BODY MASS INDEX: 31.32 KG/M2

## 2022-06-06 DIAGNOSIS — Z30.09 ENCOUNTER FOR OTHER GENERAL COUNSELING AND ADVICE ON CONTRACEPTION: ICD-10-CM

## 2022-06-06 PROCEDURE — 99999 PR PBB SHADOW E&M-EST. PATIENT-LVL III: CPT | Mod: PBBFAC,,, | Performed by: OBSTETRICS & GYNECOLOGY

## 2022-06-06 PROCEDURE — 0503F POSTPARTUM CARE VISIT: CPT | Mod: S$GLB,,, | Performed by: OBSTETRICS & GYNECOLOGY

## 2022-06-06 PROCEDURE — 99999 PR PBB SHADOW E&M-EST. PATIENT-LVL III: ICD-10-PCS | Mod: PBBFAC,,, | Performed by: OBSTETRICS & GYNECOLOGY

## 2022-06-06 PROCEDURE — 0503F PR POSTPARTUM CARE VISIT: ICD-10-PCS | Mod: S$GLB,,, | Performed by: OBSTETRICS & GYNECOLOGY

## 2022-06-06 RX ORDER — ACETAMINOPHEN AND CODEINE PHOSPHATE 120; 12 MG/5ML; MG/5ML
1 SOLUTION ORAL DAILY
Qty: 28 TABLET | Refills: 11 | Status: SHIPPED | OUTPATIENT
Start: 2022-06-06 | End: 2022-09-16

## 2022-06-06 RX ORDER — DOCUSATE SODIUM 100 MG/1
100 CAPSULE, LIQUID FILLED ORAL 2 TIMES DAILY
Qty: 60 CAPSULE | Refills: 1 | Status: SHIPPED | OUTPATIENT
Start: 2022-06-06 | End: 2023-06-06

## 2022-06-06 NOTE — PROGRESS NOTES
"  Subjective:      Eve Salazar is a 26 y.o.  who presents for a postpartum visit.  She is status post   delivery secondary to arrest of dilation 6 weeks ago.  Her hospitalization was not complicated but she was induced 2/2 gHTN. Has been taking her Procardia irregularly.  She is breastfeeding.  She desires oral progesterone-only contraceptive for contraception.  She denies signs and symptoms of postpartum depression.    Her last pap was NILM on 2021     Objective:     BP (!) 148/86   Ht 5' 6" (1.676 m)   Wt 88.4 kg (194 lb 14.2 oz)   LMP 07/10/2021 (Approximate)   Breastfeeding Yes   BMI 31.46 kg/m²     General: healthy, no distress  Abdomen: Normal, benign.    Assessment:      delivery delivered    Encounter for other general counseling and advice on contraception  -     norethindrone (ORTHO MICRONOR) 0.35 mg tablet; Take 1 tablet (0.35 mg total) by mouth once daily.  Dispense: 28 tablet; Refill: 11    Other orders  -     docusate sodium (COLACE) 100 MG capsule; Take 1 capsule (100 mg total) by mouth 2 (two) times daily.  Dispense: 60 capsule; Refill: 1      Plan:     1. Return to clinic in 3-4 months for annual exam  2. Continue Procardia as Rx'd. Pt instructed to set an alarm.   3. Starting pOCPs today.       "

## 2022-09-16 ENCOUNTER — OFFICE VISIT (OUTPATIENT)
Dept: OBSTETRICS AND GYNECOLOGY | Facility: CLINIC | Age: 27
End: 2022-09-16
Payer: OTHER GOVERNMENT

## 2022-09-16 VITALS
DIASTOLIC BLOOD PRESSURE: 80 MMHG | HEIGHT: 66 IN | BODY MASS INDEX: 30.59 KG/M2 | SYSTOLIC BLOOD PRESSURE: 134 MMHG | WEIGHT: 190.38 LBS

## 2022-09-16 DIAGNOSIS — A60.9 HSV (HERPES SIMPLEX VIRUS) ANOGENITAL INFECTION: ICD-10-CM

## 2022-09-16 DIAGNOSIS — Z12.4 SCREENING FOR CERVICAL CANCER: Primary | ICD-10-CM

## 2022-09-16 DIAGNOSIS — Z30.09 ENCOUNTER FOR OTHER GENERAL COUNSELING AND ADVICE ON CONTRACEPTION: ICD-10-CM

## 2022-09-16 PROCEDURE — 99395 PR PREVENTIVE VISIT,EST,18-39: ICD-10-PCS | Mod: S$GLB,,, | Performed by: OBSTETRICS & GYNECOLOGY

## 2022-09-16 PROCEDURE — 99999 PR PBB SHADOW E&M-EST. PATIENT-LVL III: CPT | Mod: PBBFAC,,, | Performed by: OBSTETRICS & GYNECOLOGY

## 2022-09-16 PROCEDURE — 99999 PR PBB SHADOW E&M-EST. PATIENT-LVL III: ICD-10-PCS | Mod: PBBFAC,,, | Performed by: OBSTETRICS & GYNECOLOGY

## 2022-09-16 PROCEDURE — 99395 PREV VISIT EST AGE 18-39: CPT | Mod: S$GLB,,, | Performed by: OBSTETRICS & GYNECOLOGY

## 2022-09-16 RX ORDER — ACETAMINOPHEN AND CODEINE PHOSPHATE 120; 12 MG/5ML; MG/5ML
1 SOLUTION ORAL DAILY
Qty: 28 TABLET | Refills: 11 | Status: CANCELLED | OUTPATIENT
Start: 2022-09-16 | End: 2023-09-16

## 2022-09-16 RX ORDER — VALACYCLOVIR HYDROCHLORIDE 500 MG/1
500 TABLET, FILM COATED ORAL DAILY
Qty: 90 TABLET | Refills: 3 | Status: SHIPPED | OUTPATIENT
Start: 2022-09-16 | End: 2023-10-16

## 2022-09-16 RX ORDER — TIMOLOL MALEATE 5 MG/ML
1 SOLUTION/ DROPS OPHTHALMIC DAILY
Qty: 28 TABLET | Refills: 11 | Status: SHIPPED | OUTPATIENT
Start: 2022-09-16 | End: 2023-09-16

## 2022-09-16 NOTE — PROGRESS NOTES
"Chief Complaint: Well Woman Exam     HPI:      Eve Salazar is a 27 y.o.  who presents today for well woman exam.  LMP: No LMP recorded. (Menstrual status: Birth Control).  No issues, problems, or complaints. Specifically, patient denies abnormal vaginal bleeding, discharge, pelvic pain, urinary problems, or changes in appetite. Ms. Salazar is currently sexually active with a single male partner. She is currently using oral progesterone-only contraceptive for contraception. She declines STD screening today.    Previous Pap: NILM (2021)    Ms. Salazar confirms that she wears her seatbelt when riding in the car and does text while driving.     OB History          1    Para   1    Term   1       0    AB   0    Living   1         SAB   0    IAB   0    Ectopic   0    Multiple   0    Live Births   1                 ROS:     GENERAL: Denies unintentional weight gain or weight loss. Feeling well overall.   SKIN: Denies rash or lesions.   HEENT: Denies headaches, or vision changes.   CARDIOVASCULAR: Denies palpitations or chest pain.   RESPIRATORY: Denies shortness of breath or dyspnea on exertion.  BREASTS: Denies lumps or nipple discharge.   ABDOMEN: Denies constipation, diarrhea, nausea, vomiting, change in appetite.  URINARY: Denies frequency, dysuria.  NEUROLOGIC: Denies syncope or weakness.   PSYCHIATRIC: Denies uncontrolled depression or anxiety.    Physical Exam:      PHYSICAL EXAM:  /80   Ht 5' 6" (1.676 m)   Wt 86.4 kg (190 lb 5.9 oz)   Breastfeeding Yes   BMI 30.73 kg/m²   Body mass index is 30.73 kg/m².     APPEARANCE: Well nourished, well developed, in no acute distress.  PSYCH: Appropriate mood and affect.  SKIN: No acne or hirsutism  NECK: Neck symmetric without masses  NODES: No inguinal, axillary, or supraclavicular lymph node enlargement  ABDOMEN: Soft.  No tenderness or masses.    CARDIOVASCULAR: No edema of peripheral extremities  BREASTS: Symmetrical, no visible skin " lesions. No palpable masses. No nipple discharge bilaterally.  PELVIC: Normal external genitalia without lesions.  Normal hair distribution.  Adequate perineal body, normal urethral meatus.  Vagina moist and well rugated. Without lesions. Vagina without discharge.  Cervix pink, without lesions, discharge or tenderness.  No significant cystocele or rectocele.  Bimanual exam shows uterus to be normal size, regular, mobile and nontender.  Adnexa without masses or tenderness.      Assessment/Plan:     Screening for cervical cancer  -     Liquid-Based Pap Smear, Screening    Encounter for other general counseling and advice on contraception  -     levonorgestrel-ethinyl estradiol (VIENVA) 0.1-20 mg-mcg per tablet; Take 1 tablet by mouth once daily.  Dispense: 28 tablet; Refill: 11    HSV (herpes simplex virus) anogenital infection  -     valACYclovir (VALTREX) 500 MG tablet; Take 1 tablet (500 mg total) by mouth once daily.  Dispense: 90 tablet; Refill: 3      Follow up in about 1 year (around 9/16/2023) for Annual Exam.    Counseling:     Patient was counseled today on current ASCCP pap guidelines, the recommendation for yearly physical exams, safe driving habits, breast self awareness. She is to see her PCP for other health maintenance.     Use of the [employer] Patient Portal discussed and encouraged during today's visit.

## 2022-11-18 ENCOUNTER — PATIENT MESSAGE (OUTPATIENT)
Dept: OBSTETRICS AND GYNECOLOGY | Facility: CLINIC | Age: 27
End: 2022-11-18
Payer: OTHER GOVERNMENT

## 2023-01-18 ENCOUNTER — PATIENT MESSAGE (OUTPATIENT)
Dept: OBSTETRICS AND GYNECOLOGY | Facility: CLINIC | Age: 28
End: 2023-01-18
Payer: OTHER GOVERNMENT

## 2023-06-21 ENCOUNTER — PATIENT MESSAGE (OUTPATIENT)
Dept: OBSTETRICS AND GYNECOLOGY | Facility: CLINIC | Age: 28
End: 2023-06-21
Payer: OTHER GOVERNMENT

## 2023-06-21 DIAGNOSIS — R39.89 SUSPECTED UTI: Primary | ICD-10-CM

## 2023-06-22 ENCOUNTER — PATIENT MESSAGE (OUTPATIENT)
Dept: OBSTETRICS AND GYNECOLOGY | Facility: CLINIC | Age: 28
End: 2023-06-22
Payer: OTHER GOVERNMENT

## 2023-06-22 RX ORDER — NITROFURANTOIN 25; 75 MG/1; MG/1
100 CAPSULE ORAL 2 TIMES DAILY
Qty: 10 CAPSULE | Refills: 0 | Status: SHIPPED | OUTPATIENT
Start: 2023-06-22 | End: 2023-06-27

## 2023-12-12 ENCOUNTER — OFFICE VISIT (OUTPATIENT)
Dept: OBSTETRICS AND GYNECOLOGY | Facility: CLINIC | Age: 28
End: 2023-12-12
Payer: OTHER GOVERNMENT

## 2023-12-12 VITALS
WEIGHT: 163.38 LBS | HEIGHT: 66 IN | BODY MASS INDEX: 26.26 KG/M2 | DIASTOLIC BLOOD PRESSURE: 82 MMHG | SYSTOLIC BLOOD PRESSURE: 120 MMHG

## 2023-12-12 DIAGNOSIS — Z01.419 ENCOUNTER FOR ANNUAL ROUTINE GYNECOLOGICAL EXAMINATION: Primary | ICD-10-CM

## 2023-12-12 PROCEDURE — 99999 PR PBB SHADOW E&M-EST. PATIENT-LVL III: ICD-10-PCS | Mod: PBBFAC,,, | Performed by: OBSTETRICS & GYNECOLOGY

## 2023-12-12 PROCEDURE — 99999 PR PBB SHADOW E&M-EST. PATIENT-LVL III: CPT | Mod: PBBFAC,,, | Performed by: OBSTETRICS & GYNECOLOGY

## 2023-12-12 PROCEDURE — 99395 PR PREVENTIVE VISIT,EST,18-39: ICD-10-PCS | Mod: S$GLB,,, | Performed by: OBSTETRICS & GYNECOLOGY

## 2023-12-12 PROCEDURE — 99395 PREV VISIT EST AGE 18-39: CPT | Mod: S$GLB,,, | Performed by: OBSTETRICS & GYNECOLOGY

## 2023-12-12 NOTE — PROGRESS NOTES
"Chief Complaint: Well Woman Exam     HPI:      Eve Salazar is a 28 y.o.  who presents today for well woman exam.  LMP: Patient's last menstrual period was 2023 (exact date).  No issues, problems, or complaints. Specifically, patient denies abnormal vaginal bleeding, discharge, pelvic pain, urinary problems, or changes in appetite. Ms. Salazar is currently sexually active with a single male partner. She is currently using no method for contraception and is open to pregnancy. She declines STD screening today.    Previous Pap: NILM (2021)    Ms. Salazar confirms that she wears her seatbelt when riding in the car and does text while driving.     OB History          1    Para   1    Term   1       0    AB   0    Living   1         SAB   0    IAB   0    Ectopic   0    Multiple   0    Live Births   1               ROS:     GENERAL: Denies unintentional weight gain or weight loss. Feeling well overall.   SKIN: Denies rash or lesions.   HEENT: Denies headaches, or vision changes.   CARDIOVASCULAR: Denies palpitations or chest pain.   RESPIRATORY: Denies shortness of breath or dyspnea on exertion.  BREASTS: Denies lumps or nipple discharge.   ABDOMEN: Denies constipation, diarrhea, nausea, vomiting, change in appetite.  URINARY: Denies frequency, dysuria.  NEUROLOGIC: Denies syncope or weakness.   PSYCHIATRIC: Denies uncontrolled depression or anxiety.    Physical Exam:      PHYSICAL EXAM:  /82   Ht 5' 6" (1.676 m)   Wt 74.1 kg (163 lb 5.8 oz)   LMP 2023 (Exact Date)   Breastfeeding No   BMI 26.37 kg/m²   Body mass index is 26.37 kg/m².     APPEARANCE: Well nourished, well developed, in no acute distress.  PSYCH: Appropriate mood and affect.  SKIN: No acne or hirsutism  NECK: Neck symmetric without masses  NODES: No inguinal, axillary, or supraclavicular lymph node enlargement  ABDOMEN: Soft.  No tenderness or masses.    CARDIOVASCULAR: No edema of peripheral " extremities  BREASTS: Symmetrical, no visible skin lesions. No palpable masses. No nipple discharge bilaterally.  PELVIC: Normal external genitalia without lesions.  Normal hair distribution.  Adequate perineal body, normal urethral meatus.  Vagina moist and well rugated. Without lesions. Vagina without discharge.  Cervix pink, without lesions, discharge or tenderness.  No significant cystocele or rectocele.  Bimanual exam shows uterus to be normal size, regular, mobile and nontender.  Adnexa without masses or tenderness.      Assessment/Plan:     Encounter for annual routine gynecological examination          Follow up in about 1 year (around 12/12/2024) for Annual Exam.    Counseling:     Patient was counseled today on current ASCCP pap guidelines, the recommendation for yearly physical exams, safe driving habits, breast self awareness. She is to see her PCP for other health maintenance.     Use of the [employer] Patient Portal discussed and encouraged during today's visit.

## 2024-05-13 ENCOUNTER — TELEPHONE (OUTPATIENT)
Dept: OBSTETRICS AND GYNECOLOGY | Facility: CLINIC | Age: 29
End: 2024-05-13
Payer: OTHER GOVERNMENT

## 2024-05-13 DIAGNOSIS — Z34.90 PREGNANCY, UNSPECIFIED GESTATIONAL AGE: Primary | ICD-10-CM

## 2024-06-11 NOTE — PROGRESS NOTES
Chief Complaint: Absence of Menses     HPI:      Eve Salazar is a 28 y.o.  who presents complaining of absence of menses.  She reports nausea and vomiting. Denies vaginal bleeding since LMP. Patient's last menstrual period was 2024 (exact date).      Pregnancy History:   G1 - C/S 2/2 failure to progress after IOL at 38w1d for cHTN on Procardia.      Past Medical History:   Diagnosis Date    HSV (herpes simplex virus) anogenital infection      Past Surgical History:   Procedure Laterality Date     SECTION N/A 2022    Procedure:  SECTION;  Surgeon: Laura Vasques MD;  Location: [facility] L&D;  Service: OB/GYN;  Laterality: N/A;     Social History     Tobacco Use    Smoking status: Former    Smokeless tobacco: Never   Substance Use Topics    Alcohol use: Not Currently     Alcohol/week: 2.0 standard drinks of alcohol     Types: 2 Glasses of wine per week    Drug use: No     Family History   Problem Relation Name Age of Onset    Breast cancer Neg Hx      Cancer Neg Hx      Colon cancer Neg Hx      Diabetes Neg Hx      Eclampsia Neg Hx      Hypertension Neg Hx      Miscarriages / Stillbirths Neg Hx      Ovarian cancer Neg Hx       labor Neg Hx      Stroke Neg Hx       OB History    Para Term  AB Living   2 1 1 0 0 1   SAB IAB Ectopic Multiple Live Births   0 0 0 0 1      # Outcome Date GA Lbr Klever/2nd Weight Sex Type Anes PTL Lv   2 Current            1 Term 22 38w2d  3.91 kg (8 lb 9.9 oz) M CS-LTranv Spinal, EPI N BUTCH      Complications: Failure to Progress in First Stage       Physical Exam:      PHYSICAL EXAM:  /80   Wt 82.2 kg (181 lb 3.5 oz)   LMP 2023 (Exact Date)   BMI 29.25 kg/m²   Body mass index is 29.25 kg/m².     APPEARANCE: Well nourished, well developed, in no acute distress.    Assessment/Plan:     Eve was seen today for routine prenatal visit.    Diagnoses and all orders for this visit:    9 weeks gestation of  pregnancy  -     Treponema Pallidium Antibodies IgG, IgM; Future  -     CBC Auto Differential; Future  -     Urine culture  -     Comprehensive Metabolic Panel; Future  -     Hepatitis B Surface Antigen; Future  -     HIV 1/2 Ag/Ab (4th Gen); Future  -     Rubella Antibody, IgG; Future  -     TSH; Future  -     Type & Screen - Ob Profile; Future  -     US MFM Procedure (Viewpoint); Future  -     C. trachomatis/N. gonorrhoeae by AMP DNA  -     HEPATITIS C ANTIBODY; Future    Nausea and vomiting of pregnancy, antepartum  -     ondansetron (ZOFRAN) 4 MG tablet; Take 1 tablet (4 mg total) by mouth daily as needed for Nausea.    Quadruplet gestation in first trimester, unspecified multiple gestation type  -     Ambulatory referral/consult to Perinatology; Future  -     US MFM Procedure (Viewpoint); Future    History of  delivery, antepartum    Herpes simplex type 2 (HSV-2) infection affecting pregnancy, antepartum    Chronic hypertension during pregnancy    Other orders  -     aspirin (ECOTRIN) 81 MG EC tablet; Take 1 tablet (81 mg total) by mouth once daily.        No follow-ups on file.    Counselin. MFM referral placed for quadruplet pregnancy.   2. Increased surveillance throughout this pregnancy discussed as well as increased risks of HTN issues,  delivery, genetic anomalies with babies, etc.   3. Medications safe in pregnancy list provided.  4. Daily 81 mg ASA was recommended to be started at 12 weeks    30 minutes of face-to-face discussion occurred during today's visit.     Use of the [employer] Patient Portal discussed and encouraged during today's visit.

## 2024-06-12 ENCOUNTER — INITIAL PRENATAL (OUTPATIENT)
Dept: OBSTETRICS AND GYNECOLOGY | Facility: CLINIC | Age: 29
End: 2024-06-12
Payer: OTHER GOVERNMENT

## 2024-06-12 VITALS
SYSTOLIC BLOOD PRESSURE: 124 MMHG | DIASTOLIC BLOOD PRESSURE: 80 MMHG | WEIGHT: 181.19 LBS | BODY MASS INDEX: 29.25 KG/M2

## 2024-06-12 DIAGNOSIS — O10.919 CHRONIC HYPERTENSION DURING PREGNANCY: ICD-10-CM

## 2024-06-12 DIAGNOSIS — O21.9 NAUSEA AND VOMITING OF PREGNANCY, ANTEPARTUM: ICD-10-CM

## 2024-06-12 DIAGNOSIS — Z34.90 PREGNANCY, UNSPECIFIED GESTATIONAL AGE: ICD-10-CM

## 2024-06-12 DIAGNOSIS — O30.201 QUADRUPLET GESTATION IN FIRST TRIMESTER, UNSPECIFIED MULTIPLE GESTATION TYPE: ICD-10-CM

## 2024-06-12 DIAGNOSIS — B00.9 HERPES SIMPLEX TYPE 2 (HSV-2) INFECTION AFFECTING PREGNANCY, ANTEPARTUM: ICD-10-CM

## 2024-06-12 DIAGNOSIS — O98.519 HERPES SIMPLEX TYPE 2 (HSV-2) INFECTION AFFECTING PREGNANCY, ANTEPARTUM: ICD-10-CM

## 2024-06-12 DIAGNOSIS — O34.219 HISTORY OF CESAREAN DELIVERY, ANTEPARTUM: ICD-10-CM

## 2024-06-12 DIAGNOSIS — Z3A.09 9 WEEKS GESTATION OF PREGNANCY: Primary | ICD-10-CM

## 2024-06-12 PROCEDURE — 87086 URINE CULTURE/COLONY COUNT: CPT | Performed by: OBSTETRICS & GYNECOLOGY

## 2024-06-12 PROCEDURE — 76802 OB US < 14 WKS ADDL FETUS: CPT | Mod: S$GLB,,, | Performed by: OBSTETRICS & GYNECOLOGY

## 2024-06-12 PROCEDURE — 99999 PR PBB SHADOW E&M-EST. PATIENT-LVL III: CPT | Mod: PBBFAC,,, | Performed by: OBSTETRICS & GYNECOLOGY

## 2024-06-12 PROCEDURE — 0500F INITIAL PRENATAL CARE VISIT: CPT | Mod: S$GLB,,, | Performed by: OBSTETRICS & GYNECOLOGY

## 2024-06-12 PROCEDURE — 87491 CHLMYD TRACH DNA AMP PROBE: CPT | Performed by: OBSTETRICS & GYNECOLOGY

## 2024-06-12 PROCEDURE — 76801 OB US < 14 WKS SINGLE FETUS: CPT | Mod: S$GLB,,, | Performed by: OBSTETRICS & GYNECOLOGY

## 2024-06-12 PROCEDURE — 87591 N.GONORRHOEAE DNA AMP PROB: CPT | Performed by: OBSTETRICS & GYNECOLOGY

## 2024-06-12 RX ORDER — ONDANSETRON 4 MG/1
4 TABLET, FILM COATED ORAL DAILY PRN
Qty: 30 TABLET | Refills: 1 | Status: SHIPPED | OUTPATIENT
Start: 2024-06-12 | End: 2025-06-12

## 2024-06-12 RX ORDER — ASPIRIN 81 MG/1
81 TABLET ORAL DAILY
Qty: 180 TABLET | Refills: 1 | Status: SHIPPED | OUTPATIENT
Start: 2024-06-12 | End: 2025-06-12

## 2024-06-13 LAB
C TRACH DNA SPEC QL NAA+PROBE: NOT DETECTED
N GONORRHOEA DNA SPEC QL NAA+PROBE: NOT DETECTED

## 2024-06-14 LAB
BACTERIA UR CULT: NORMAL
BACTERIA UR CULT: NORMAL

## 2024-06-18 ENCOUNTER — TELEPHONE (OUTPATIENT)
Dept: MATERNAL FETAL MEDICINE | Facility: CLINIC | Age: 29
End: 2024-06-18

## 2024-06-18 ENCOUNTER — PROCEDURE VISIT (OUTPATIENT)
Dept: MATERNAL FETAL MEDICINE | Facility: CLINIC | Age: 29
End: 2024-06-18
Payer: OTHER GOVERNMENT

## 2024-06-18 ENCOUNTER — OFFICE VISIT (OUTPATIENT)
Dept: MATERNAL FETAL MEDICINE | Facility: CLINIC | Age: 29
End: 2024-06-18
Payer: OTHER GOVERNMENT

## 2024-06-18 VITALS
HEIGHT: 66 IN | BODY MASS INDEX: 29.32 KG/M2 | SYSTOLIC BLOOD PRESSURE: 137 MMHG | WEIGHT: 182.44 LBS | DIASTOLIC BLOOD PRESSURE: 85 MMHG

## 2024-06-18 DIAGNOSIS — O30.201 QUADRUPLET GESTATION IN FIRST TRIMESTER, UNSPECIFIED MULTIPLE GESTATION TYPE: Primary | ICD-10-CM

## 2024-06-18 DIAGNOSIS — A60.9 HSV (HERPES SIMPLEX VIRUS) ANOGENITAL INFECTION: ICD-10-CM

## 2024-06-18 DIAGNOSIS — Z98.891 S/P CESAREAN SECTION: ICD-10-CM

## 2024-06-18 DIAGNOSIS — O10.919 CHRONIC HYPERTENSION IN PREGNANCY: ICD-10-CM

## 2024-06-18 DIAGNOSIS — O30.201 QUADRUPLET GESTATION IN FIRST TRIMESTER, UNSPECIFIED MULTIPLE GESTATION TYPE: ICD-10-CM

## 2024-06-18 DIAGNOSIS — O30.231 QUADRACHORIONIC QUADRA-AMNIOTIC QUADRUPLET PREGNANCY IN FIRST TRIMESTER: Primary | ICD-10-CM

## 2024-06-18 PROCEDURE — 76815 OB US LIMITED FETUS(S): CPT | Mod: S$GLB,,, | Performed by: OBSTETRICS & GYNECOLOGY

## 2024-06-18 PROCEDURE — 99999 PR PBB SHADOW E&M-EST. PATIENT-LVL IV: CPT | Mod: PBBFAC,,, | Performed by: OBSTETRICS & GYNECOLOGY

## 2024-06-18 PROCEDURE — 99214 OFFICE O/P EST MOD 30 MIN: CPT | Mod: S$GLB,,, | Performed by: OBSTETRICS & GYNECOLOGY

## 2024-06-18 NOTE — PROGRESS NOTES
MATERNAL-FETAL MEDICINE   CONSULT NOTE    Provider requesting consultation: Dr. Vasques    SUBJECTIVE:     Ms. Eve Salazar is a 28 y.o.  female with IUP at 10w3d who is seen in consultation by MFM for evaluation and management of quad/quad pregnancy and chronic hypertension    She is overall feeling well. Minimal N/v, but tolerating PO. Does reports having a bad taste in her mouth on some days.  Patient denies any contractions/cramping, vaginal bleeding or leakage of fluid.    Medication List with Changes/Refills   Current Medications    ASPIRIN (ECOTRIN) 81 MG EC TABLET    Take 1 tablet (81 mg total) by mouth once daily.    ONDANSETRON (ZOFRAN) 4 MG TABLET    Take 1 tablet (4 mg total) by mouth daily as needed for Nausea.    PRENATAL VIT NO.124/IRON/FOLIC (PRENATAL VITAMIN ORAL)    Take by mouth Daily.    VALACYCLOVIR (VALTREX) 500 MG TABLET    TAKE 1 TABLET(500 MG) BY MOUTH EVERY DAY     Review of patient's allergies indicates:  No Known Allergies    PMH:  Past Medical History:   Diagnosis Date    HSV (herpes simplex virus) anogenital infection        PObHx:  OB History    Para Term  AB Living   2 1 1 0 0 1   SAB IAB Ectopic Multiple Live Births   0 0 0 0 1      # Outcome Date GA Lbr Klever/2nd Weight Sex Type Anes PTL Lv   2 Current            1 Term 22 38w2d  3.91 kg (8 lb 9.9 oz) M CS-LTranv Spinal, EPI N BUTCH      Complications: Failure to Progress in First Stage     PSH:  Past Surgical History:   Procedure Laterality Date     SECTION N/A 2022    Procedure:  SECTION;  Surgeon: Laura Vasques MD;  Location: [facility] L&D;  Service: OB/GYN;  Laterality: N/A;     Family history:family history is not on file.    Social history: reports that she has quit smoking. She has never used smokeless tobacco. She reports that she does not currently use alcohol after a past usage of about 2.0 standard drinks of alcohol per week. She reports that she does not use  "drugs.    Genetic history: The patient denies any inherited genetic diseases or birth defects in herself or her partner's personal history or family.    Objective:   /85 (BP Location: Left arm, Patient Position: Sitting, BP Method: Large (Automatic))   Ht 5' 6" (1.676 m)   Wt 82.7 kg (182 lb 6.9 oz)   LMP 2023 (Exact Date)   BMI 29.45 kg/m²     Ultrasound performed. See viewpoint for full ultrasound report.  Quad/Quad living IUP  Fluid grossly normal for all four.  Please see media for nomenclature/location of each fetus    Significant labs/imaging:  N/a          ASSESSMENT/PLAN:     28 y.o.  female with IUP at 10w3d     Quadrachorionic quadra-amniotic quadruplet pregnancy in first trimester  Quadrachorionic/Quadra-amniotic Gestation  We reviewed the risks associated with higher order multifetal gestation. These risks include anemia,  labor, premature rupture of the membranes,  delivery, preeclampsia, gestational diabetes, prolonged antepartum hospitalization, miscarriage/stillbirth, and congenital abnormalities. We discussed the option for multifetal pregnancy reduction in detail and R/B associated with this - patient declines.   We discussed the various methods of genetic screening in setting of higher order multiples via nuchal translucency ultrasound and/or cell free DNA testing. She declines these testing options.   We discussed the risk of  delivery (average quad gestation delivers at 30 weeks), fetal anomalies, fetal growth restriction, and the potential for fetal growth restriction to impact pregnancy management. The patient was counseled on prematurity risks including periviability, the risk of cerebral palsy, prolonged NICU stay, and prematurity complications. Lastly, maternal risks were reviewed, including the risk of pregnancy associated hypertension including superimposed preeclampsia (20-40%, although likely higher in setting of CHTN), gestational diabetes, " postpartum hemorrhage, cholestasis of pregnancy, and gestational anemia.    Recommendations:  Folic acid 1 mg daily and low-dose aspirin (81 mg daily) for preeclampsia risk reduction.    Early anatomy at 16 weeks   TV CL screening will be performed around this time.  Consider early GDM screening  Detailed anatomy survey at 19-20 weeks - scheduled  Growth ultrasounds every 3-4 weeks following detailed anatomic survey  Fetal echocardiogram will be ordered at 22-23 weeks  Can consider antepartum testing at 32 weeks, if not delivered  Given increased risks throughout pregnancy, consider routine prenatal visits every 2 weeks from 20-28 weeks; weekly visits thereafter  Close monitoring for development of preeclampsia. See cHTN for specific recommendations  If admitted to the hospital for antepartum indications would recommend prophylactic Lovenox daily according to the [facility] thromboprophylaxis guidelines.     Delivery in the absence of maternal or fetal complications should occur in the 32-33 week.  Delivery timing may be adjusted depending on pregnancy course.        Chronic hypertension in pregnancy  Chronic Hypertension  Previously diagnosed with cHTN during last pregnancy. She was started on procardia 30 XL, which was discontinued after delivery. She reports normal BPs outside of pregnancy, especially after losing weight. Has not followed with PCP.    Today I counseled the patient on maternal/fetal risks associated with CHTN during pregnancy. Risks include but not limited to fetal growth restriction, miscarriage, abruption, maternal end organ disease (renal failure, MI, and stroke),  delivery, development of superimposed preeclampsia, and eclampsia. She was counseled on the recommendations for blood pressure control, serial ultrasound for fetal growth assessment and  testing, and timing of delivery. I also counseled her on the recommendation for aspirin 81 mg daily which may decrease her risk of  developing superimposed preeclampsia.     Recommendations (Please refer to [facility] guidelines):  Initiate aspirin 81 mg daily at 12-16 weeks gestation for preeclampsia risk reduction  No need for medications at this time.  Consider signing patient up for CONNECTED MoMs  Baseline evaluation with primary OB:   24-hour urine protein or baseline P/C ratio, CMP, and CBC.  Maternal EKG  Maternal ophthalmic evaluation  Continued close observation of patient's blood pressures. Avoid hypotension as this has been associated with uteroplacental insufficiency.  Recommend treatment to a goal blood pressure < 140/90    Delivery timing:  Per other problems.      S/P  section  Management per primary OB provider      HSV (herpes simplex virus) anogenital infection  Management per primary OB provider    FOLLOW UP:   16 week early anatomy ultrasound scheduled with MD visit      PATRICK Jean-Baptiste MD  Maternal Fetal Medicine Fellow   PGY-5       ATTENDING ATTESTATION  I have seen the patient and reviewed the Dr. Jean-Baptiste's consultation note, assessment and plan. I have personally spoken to and discussed plan with the patient and agree with the findings. All changes made to the body of the note.      Patient was counseled that prenatal ultrasound studies have limitations. They do not detect all fetal, genetic, placental, and maternal abnormalities. A normal appearing prenatal ultrasound is reassuring. However, it does not guarantee the absence of an abnormality or predict a normal outcome for the fetus or the mother.   The patient was given an opportunity to ask questions about the management of her high risk pregnancy problems. She expressed an understanding of and agreement to the above impression and plan. All questions were answered to her satisfaction.    40 minutes of total time spent on the encounter, which includes face to face time and non-face to face time preparing to see the patient (eg, review of tests), obtaining  and/or reviewing separately obtained history, documenting clinical information in the electronic or other health record, independently interpreting results (not separately reported) and communicating results to the patient/family/caregiver, or care coordination (not separately reported).        Ronny Hallman MD   Maternal-Fetal Medicine      Electronically Signed by Ronny Hallman June 18, 2024

## 2024-06-18 NOTE — ASSESSMENT & PLAN NOTE
Chronic Hypertension  Previously diagnosed with cHTN during last pregnancy. She was started on procardia 30 XL, which was discontinued after delivery. She reports normal BPs outside of pregnancy, especially after losing weight. Has not followed with PCP.    Today I counseled the patient on maternal/fetal risks associated with CHTN during pregnancy. Risks include but not limited to fetal growth restriction, miscarriage, abruption, maternal end organ disease (renal failure, MI, and stroke),  delivery, development of superimposed preeclampsia, and eclampsia. She was counseled on the recommendations for blood pressure control, serial ultrasound for fetal growth assessment and  testing, and timing of delivery. I also counseled her on the recommendation for aspirin 81 mg daily which may decrease her risk of developing superimposed preeclampsia.     Recommendations (Please refer to [facility] guidelines):  Initiate aspirin 81 mg daily at 12-16 weeks gestation for preeclampsia risk reduction  No need for medications at this time.  Consider signing patient up for CONNECTED MoMs  Baseline evaluation with primary OB:   24-hour urine protein or baseline P/C ratio, CMP, and CBC.  Maternal EKG  Maternal ophthalmic evaluation  Continued close observation of patient's blood pressures. Avoid hypotension as this has been associated with uteroplacental insufficiency.  Recommend treatment to a goal blood pressure < 140/90    Delivery timing:  Per other problems.

## 2024-06-18 NOTE — Clinical Note
Here is the note from our most recent visit. Please let me know if you have any questions. [place]

## 2024-06-18 NOTE — ASSESSMENT & PLAN NOTE
Quadrachorionic/Quadra-amniotic Gestation  We reviewed the risks associated with higher order multifetal gestation. These risks include anemia,  labor, premature rupture of the membranes,  delivery, preeclampsia, gestational diabetes, prolonged antepartum hospitalization, miscarriage/stillbirth, and congenital abnormalities. We discussed the option for multifetal pregnancy reduction in detail and R/B associated with this - patient declines.   We discussed the various methods of genetic screening in setting of higher order multiples via nuchal translucency ultrasound and/or cell free DNA testing. She declines these testing options.   We discussed the risk of  delivery (average quad gestation delivers at 30 weeks), fetal anomalies, fetal growth restriction, and the potential for fetal growth restriction to impact pregnancy management. The patient was counseled on prematurity risks including periviability, the risk of cerebral palsy, prolonged NICU stay, and prematurity complications. Lastly, maternal risks were reviewed, including the risk of pregnancy associated hypertension including superimposed preeclampsia (20-40%, although likely higher in setting of CHTN), gestational diabetes, postpartum hemorrhage, cholestasis of pregnancy, and gestational anemia.    Recommendations:  Folic acid 1 mg daily and low-dose aspirin (81 mg daily) for preeclampsia risk reduction.    Early anatomy at 16 weeks   TV CL screening will be performed around this time.  Consider early GDM screening  Detailed anatomy survey at 19-20 weeks - scheduled  Growth ultrasounds every 3-4 weeks following detailed anatomic survey  Fetal echocardiogram will be ordered at 22-23 weeks  Can consider antepartum testing at 32 weeks, if not delivered  Given increased risks throughout pregnancy, consider routine prenatal visits every 2 weeks from 20-28 weeks; weekly visits thereafter  Close monitoring for development of preeclampsia. See  cHTN for specific recommendations  If admitted to the hospital for antepartum indications would recommend prophylactic Lovenox daily according to the [facility] thromboprophylaxis guidelines.     Delivery in the absence of maternal or fetal complications should occur in the 32-33 week.  Delivery timing may be adjusted depending on pregnancy course.

## 2024-07-01 ENCOUNTER — TELEPHONE (OUTPATIENT)
Dept: PEDIATRIC CARDIOLOGY | Facility: CLINIC | Age: 29
End: 2024-07-01
Payer: OTHER GOVERNMENT

## 2024-07-01 ENCOUNTER — PATIENT MESSAGE (OUTPATIENT)
Dept: PEDIATRIC CARDIOLOGY | Facility: CLINIC | Age: 29
End: 2024-07-01
Payer: OTHER GOVERNMENT

## 2024-07-01 NOTE — TELEPHONE ENCOUNTER
Call placed to patient in an attempt to schedule her fetal echos. Call answered by voicemail recording. VM left requesting a return call to [phone] to schedule.

## 2024-07-10 ENCOUNTER — ROUTINE PRENATAL (OUTPATIENT)
Dept: OBSTETRICS AND GYNECOLOGY | Facility: CLINIC | Age: 29
End: 2024-07-10
Attending: OBSTETRICS & GYNECOLOGY
Payer: OTHER GOVERNMENT

## 2024-07-10 ENCOUNTER — PATIENT MESSAGE (OUTPATIENT)
Dept: ADMINISTRATIVE | Facility: OTHER | Age: 29
End: 2024-07-10
Payer: OTHER GOVERNMENT

## 2024-07-10 VITALS — SYSTOLIC BLOOD PRESSURE: 138 MMHG | BODY MASS INDEX: 31.3 KG/M2 | DIASTOLIC BLOOD PRESSURE: 72 MMHG | WEIGHT: 193.88 LBS

## 2024-07-10 DIAGNOSIS — O30.201 QUADRUPLET GESTATION IN FIRST TRIMESTER, UNSPECIFIED MULTIPLE GESTATION TYPE: Primary | ICD-10-CM

## 2024-07-10 DIAGNOSIS — Z3A.13 13 WEEKS GESTATION OF PREGNANCY: ICD-10-CM

## 2024-07-10 PROCEDURE — 0502F SUBSEQUENT PRENATAL CARE: CPT | Mod: S$GLB,,, | Performed by: OBSTETRICS & GYNECOLOGY

## 2024-07-10 PROCEDURE — 99999 PR PBB SHADOW E&M-EST. PATIENT-LVL III: CPT | Mod: PBBFAC,,, | Performed by: OBSTETRICS & GYNECOLOGY

## 2024-07-24 ENCOUNTER — TELEPHONE (OUTPATIENT)
Dept: MATERNAL FETAL MEDICINE | Facility: CLINIC | Age: 29
End: 2024-07-24
Payer: OTHER GOVERNMENT

## 2024-07-24 NOTE — TELEPHONE ENCOUNTER
Spoke with patient this AM and gave her a number to the billing department for further questions.  Patient verbalized her understanding.    ----- Message from Ligia Silva MA sent at 7/24/2024  9:42 AM CDT -----  Regarding: Balance Inquiry  Good Morning,     I received a call from patient's , Chika Salazar. He inquired about the balance for her upcoming visit on 8/28. He is requesting a call back to discuss. Their phone number is [phone].    Thanks,   Ligia

## 2024-07-27 ENCOUNTER — PATIENT MESSAGE (OUTPATIENT)
Dept: OTHER | Facility: OTHER | Age: 29
End: 2024-07-27
Payer: OTHER GOVERNMENT

## 2024-07-31 ENCOUNTER — PROCEDURE VISIT (OUTPATIENT)
Dept: MATERNAL FETAL MEDICINE | Facility: CLINIC | Age: 29
End: 2024-07-31
Payer: OTHER GOVERNMENT

## 2024-07-31 ENCOUNTER — OFFICE VISIT (OUTPATIENT)
Dept: MATERNAL FETAL MEDICINE | Facility: CLINIC | Age: 29
End: 2024-07-31
Payer: OTHER GOVERNMENT

## 2024-07-31 VITALS
DIASTOLIC BLOOD PRESSURE: 76 MMHG | HEART RATE: 118 BPM | HEIGHT: 66 IN | SYSTOLIC BLOOD PRESSURE: 138 MMHG | WEIGHT: 206.13 LBS | BODY MASS INDEX: 33.13 KG/M2

## 2024-07-31 DIAGNOSIS — O10.919 CHRONIC HYPERTENSION IN PREGNANCY: ICD-10-CM

## 2024-07-31 DIAGNOSIS — O30.231 QUADRACHORIONIC QUADRA-AMNIOTIC QUADRUPLET PREGNANCY IN FIRST TRIMESTER: Primary | ICD-10-CM

## 2024-07-31 DIAGNOSIS — Z3A.09 9 WEEKS GESTATION OF PREGNANCY: ICD-10-CM

## 2024-07-31 PROCEDURE — 99213 OFFICE O/P EST LOW 20 MIN: CPT | Mod: S$GLB,,, | Performed by: OBSTETRICS & GYNECOLOGY

## 2024-07-31 PROCEDURE — 99999 PR PBB SHADOW E&M-EST. PATIENT-LVL III: CPT | Mod: PBBFAC,,, | Performed by: OBSTETRICS & GYNECOLOGY

## 2024-07-31 PROCEDURE — 76816 OB US FOLLOW-UP PER FETUS: CPT | Mod: S$GLB,,, | Performed by: OBSTETRICS & GYNECOLOGY

## 2024-07-31 NOTE — ASSESSMENT & PLAN NOTE
Previously diagnosed with cHTN during last pregnancy. She was started on procardia 30 XL, which was discontinued after delivery.   Currently on no meds. Overall asymptomatic.    Recommendations (Please refer to [facility] guidelines):  Continue aspirin 81 mg daily for preeclampsia risk reduction  No need for medications at this time.  Continue with CONNECTED MoMs  Baseline evaluation with primary OB as previously recommended.  Continued close observation of patient's blood pressures. Avoid hypotension as this has been associated with uteroplacental insufficiency.  Recommend treatment to a goal blood pressure < 140/90

## 2024-07-31 NOTE — PROGRESS NOTES
"Maternal Fetal Medicine follow up consult    SUBJECTIVE:     Eve Salazar is a 28 y.o.  female with IUP at 16w4d who is seen in follow up consultation by MFM.  Pregnancy complications include:   Problem   Quadrachorionic Quadra-Amniotic Quadruplet Pregnancy in First Trimester   Chronic Hypertension in Pregnancy     Previous notes reviewed.   No changes to medical, surgical, family, social, or obstetric history.    Interval history since last MFM visit:   Patient has no complaints today. She is overall feeling well.  Patient denies any contractions/cramping, vaginal bleeding or leakage of fluid.  Patient denies any headaches, blurry vision or RUQ pain.      Medications:  Current Outpatient Medications   Medication Instructions    ascorbic acid (vitamin C) (VITAMIN C) 100 mg, Oral, Daily    aspirin (ECOTRIN) 81 mg, Oral, Daily    ondansetron (ZOFRAN) 4 mg, Oral, Daily PRN    prenatal vit no.124/iron/folic (PRENATAL VITAMIN ORAL) Oral, Daily    valACYclovir (VALTREX) 500 mg, Oral     Care team members:  Layo/Farhat - Primary OB     OBJECTIVE:   /76 (BP Location: Left arm, Patient Position: Sitting, BP Method: Large (Automatic))   Pulse (!) 118   Ht 5' 6" (1.676 m)   Wt 93.5 kg (206 lb 2.1 oz)   LMP 2023 (Exact Date)   BMI 33.27 kg/m²     Physical Exam:  Deferred    Ultrasound performed. See viewpoint for full ultrasound report.  Live quadrichorionic-quadriamniotic quadruplet pregnancy     Biometry is consistent with EGA x 4  Limited early anatomy appears unremarkable x 4  MVP is subjectively normal x 4    A velamentous cord insertion is seen for Fetus D    Significant labs/imaging:  None new    ASSESSMENT/PLAN:     28 y.o.  female with IUP at 16w4d    Quadrachorionic quadra-amniotic quadruplet pregnancy in first trimester  We reviewed today's unremarkable early anatomy of all four fetuses.  We briefly reviewed the risks associated with higher order multifetal gestation once " again.   Feeling well overall at this time, only with some exhaustion and general discomforts.    Recommendations:  Folic acid 1 mg daily and low-dose aspirin (81 mg daily) for preeclampsia risk reduction.    Consider early GDM screening  Detailed anatomy survey at 20 weeks - scheduled  Growth ultrasounds every 3-4 weeks following detailed anatomic survey  Fetal echocardiogram scheduled for September 9  Can consider antepartum testing at 32 weeks, if not delivered  Given increased risks throughout pregnancy, consider routine prenatal visits every 2 weeks from 20-28 weeks; weekly visits thereafter.  Close monitoring for development of preeclampsia. See TN for specific recommendations  If admitted to the hospital for antepartum indications would recommend prophylactic Lovenox daily according to the [facility] thromboprophylaxis guidelines.     Delivery in the absence of maternal or fetal complications should occur in the 32-33 week.  Delivery timing may be adjusted depending on pregnancy course.       Chronic hypertension in pregnancy  Previously diagnosed with cHTN during last pregnancy. She was started on procardia 30 XL, which was discontinued after delivery.   Currently on no meds. Overall asymptomatic.    Recommendations (Please refer to [facility] guidelines):  Continue aspirin 81 mg daily for preeclampsia risk reduction  No need for medications at this time.  Continue with CONNECTED MoMs  Baseline evaluation with primary OB as previously recommended.  Continued close observation of patient's blood pressures. Avoid hypotension as this has been associated with uteroplacental insufficiency.  Recommend treatment to a goal blood pressure < 140/90      Patient reports having a trip planned to [place] at the end of August. We will adjust her follow up visit with us to just after she returns. We reviewed common pregnancy precautions on flights and travel. We also reviewed precautions given the rise in rate of  Dengue fever in the tropics. I directed the patient to the [facility] website for further information.      Patient was counseled that prenatal ultrasound studies have limitations. They do not detect all fetal, genetic, placental, and maternal abnormalities. A normal appearing prenatal ultrasound is reassuring. However, it does not guarantee the absence of an abnormality or predict a normal outcome for the fetus or the mother.       FOLLOW UP:   A follow up ultrasound will be made for 4 weeks from today with a follow up MFM MD visit.    The patient was given an opportunity to ask questions about the management of her high risk pregnancy problems. She expressed an understanding of and agreement to the above impression and plan. All questions were answered to her satisfaction.    25 minutes of total time spent on the encounter, which includes face to face time and non-face to face time preparing to see the patient (eg, review of tests), obtaining and/or reviewing separately obtained history, documenting clinical information in the electronic or other health record, independently interpreting results (not separately reported) and communicating results to the patient/family/caregiver, or care coordination (not separately reported).        Ronny Hallman MD   Maternal-Fetal Medicine      Electronically Signed by Ronny Hallman July 31, 2024

## 2024-07-31 NOTE — ASSESSMENT & PLAN NOTE
We reviewed today's unremarkable early anatomy of all four fetuses.  We briefly reviewed the risks associated with higher order multifetal gestation once again.   Feeling well overall at this time, only with some exhaustion and general discomforts.    Recommendations:  Folic acid 1 mg daily and low-dose aspirin (81 mg daily) for preeclampsia risk reduction.    Consider early GDM screening  Detailed anatomy survey at 20 weeks - scheduled  Growth ultrasounds every 3-4 weeks following detailed anatomic survey  Fetal echocardiogram scheduled for September 9  Can consider antepartum testing at 32 weeks, if not delivered  Given increased risks throughout pregnancy, consider routine prenatal visits every 2 weeks from 20-28 weeks; weekly visits thereafter.  Close monitoring for development of preeclampsia. See cHTN for specific recommendations  If admitted to the hospital for antepartum indications would recommend prophylactic Lovenox daily according to the [facility] thromboprophylaxis guidelines.     Delivery in the absence of maternal or fetal complications should occur in the 32-33 week.  Delivery timing may be adjusted depending on pregnancy course.

## 2024-08-03 ENCOUNTER — PATIENT MESSAGE (OUTPATIENT)
Dept: OTHER | Facility: OTHER | Age: 29
End: 2024-08-03
Payer: OTHER GOVERNMENT

## 2024-08-07 ENCOUNTER — ROUTINE PRENATAL (OUTPATIENT)
Dept: OBSTETRICS AND GYNECOLOGY | Facility: CLINIC | Age: 29
End: 2024-08-07
Attending: OBSTETRICS & GYNECOLOGY
Payer: OTHER GOVERNMENT

## 2024-08-07 ENCOUNTER — PROCEDURE VISIT (OUTPATIENT)
Dept: OBSTETRICS AND GYNECOLOGY | Facility: CLINIC | Age: 29
End: 2024-08-07
Payer: OTHER GOVERNMENT

## 2024-08-07 VITALS
SYSTOLIC BLOOD PRESSURE: 138 MMHG | BODY MASS INDEX: 33.63 KG/M2 | DIASTOLIC BLOOD PRESSURE: 78 MMHG | WEIGHT: 208.31 LBS

## 2024-08-07 DIAGNOSIS — O30.202: Primary | ICD-10-CM

## 2024-08-07 DIAGNOSIS — Z3A.17 17 WEEKS GESTATION OF PREGNANCY: ICD-10-CM

## 2024-08-07 DIAGNOSIS — O30.231 QUADRACHORIONIC QUADRA-AMNIOTIC QUADRUPLET PREGNANCY IN FIRST TRIMESTER: ICD-10-CM

## 2024-08-07 DIAGNOSIS — O30.231 QUADRACHORIONIC QUADRA-AMNIOTIC QUADRUPLET PREGNANCY IN FIRST TRIMESTER: Primary | ICD-10-CM

## 2024-08-07 PROCEDURE — 99999 PR PBB SHADOW E&M-EST. PATIENT-LVL III: CPT | Mod: PBBFAC,,, | Performed by: OBSTETRICS & GYNECOLOGY

## 2024-08-07 PROCEDURE — 76817 TRANSVAGINAL US OBSTETRIC: CPT | Mod: S$GLB,,, | Performed by: OBSTETRICS & GYNECOLOGY

## 2024-08-07 PROCEDURE — 76815 OB US LIMITED FETUS(S): CPT | Mod: 59,S$GLB,, | Performed by: OBSTETRICS & GYNECOLOGY

## 2024-08-07 PROCEDURE — 0502F SUBSEQUENT PRENATAL CARE: CPT | Mod: S$GLB,,, | Performed by: OBSTETRICS & GYNECOLOGY

## 2024-08-07 RX ORDER — FOLIC ACID 0.8 MG
800 TABLET ORAL DAILY
COMMUNITY

## 2024-08-24 ENCOUNTER — PATIENT MESSAGE (OUTPATIENT)
Dept: OTHER | Facility: OTHER | Age: 29
End: 2024-08-24
Payer: OTHER GOVERNMENT

## 2024-08-29 ENCOUNTER — OFFICE VISIT (OUTPATIENT)
Dept: MATERNAL FETAL MEDICINE | Facility: CLINIC | Age: 29
End: 2024-08-29
Payer: OTHER GOVERNMENT

## 2024-08-29 ENCOUNTER — PROCEDURE VISIT (OUTPATIENT)
Dept: MATERNAL FETAL MEDICINE | Facility: CLINIC | Age: 29
End: 2024-08-29
Payer: OTHER GOVERNMENT

## 2024-08-29 VITALS
DIASTOLIC BLOOD PRESSURE: 78 MMHG | HEIGHT: 66 IN | BODY MASS INDEX: 35.12 KG/M2 | WEIGHT: 218.5 LBS | SYSTOLIC BLOOD PRESSURE: 139 MMHG

## 2024-08-29 DIAGNOSIS — O30.231 QUADRACHORIONIC QUADRA-AMNIOTIC QUADRUPLET PREGNANCY IN FIRST TRIMESTER: Primary | ICD-10-CM

## 2024-08-29 DIAGNOSIS — O30.231 QUADRACHORIONIC QUADRA-AMNIOTIC QUADRUPLET PREGNANCY IN FIRST TRIMESTER: ICD-10-CM

## 2024-08-29 DIAGNOSIS — O30.201 QUADRUPLET GESTATION IN FIRST TRIMESTER, UNSPECIFIED MULTIPLE GESTATION TYPE: ICD-10-CM

## 2024-08-29 DIAGNOSIS — O10.919 CHRONIC HYPERTENSION IN PREGNANCY: ICD-10-CM

## 2024-08-29 PROCEDURE — 99999 PR PBB SHADOW E&M-EST. PATIENT-LVL III: CPT | Mod: PBBFAC,,, | Performed by: OBSTETRICS & GYNECOLOGY

## 2024-08-29 NOTE — ASSESSMENT & PLAN NOTE
Previously diagnosed with cHTN during last pregnancy. Currently on no meds. Overall asymptomatic.    Recommendations (Please refer to [facility] guidelines):  Continue aspirin 81 mg daily for preeclampsia risk reduction  No need for medications at this time.  Continue with CONNECTED MoMs  Baseline evaluation with primary OB as previously recommended.  Continued close observation of patient's blood pressures. Avoid hypotension as this has been associated with uteroplacental insufficiency.  Recommend treatment to a goal blood pressure < 140/90

## 2024-08-29 NOTE — ASSESSMENT & PLAN NOTE
We reviewed today's anatomy ultrasound of all four fetuses. See above  We briefly reviewed the risks associated with higher order multifetal gestation once again.   Feeling well overall at this time, only with some exhaustion and general discomforts. Recommended starting pepcid, claritin for associated symptoms.    Recommendations:  Folic acid 1 mg daily and low-dose aspirin (81 mg daily) for preeclampsia risk reduction.    Consider early GDM screening  Growth ultrasounds every 3-4 weeks following detailed anatomic survey  Fetal echocardiogram scheduled for September 9  Can consider antepartum testing at 32 weeks, if not delivered  Given increased risks throughout pregnancy, consider routine prenatal visits every 2 weeks from 20-28 weeks; weekly visits thereafter.  Close monitoring for development of preeclampsia. See cHTN for specific recommendations  If admitted to the hospital for antepartum indications would recommend prophylactic Lovenox daily according to the [facility] thromboprophylaxis guidelines.     Delivery in the absence of maternal or fetal complications should occur in the 32-33 week.  Delivery timing may be adjusted depending on pregnancy course.

## 2024-08-29 NOTE — PROGRESS NOTES
"Maternal Fetal Medicine Follow Up Consult    SUBJECTIVE:   Eve Salazar is a 28 y.o.  female with IUP at 20w5d who is seen in follow up consultation by MFM.  Pregnancy complications include:   Problem   Quadrachorionic Quadra-Amniotic Quadruplet Pregnancy in First Trimester   Chronic Hypertension in Pregnancy     Interval history since last MFM visit: reflux not relieved by tums and congestion/allergies    Previous notes reviewed. No changes to medical, surgical, family, social, or obstetric history.  Medications:  Current Outpatient Medications   Medication Instructions    ascorbic acid (vitamin C) (VITAMIN C) 100 mg, Oral, Daily    aspirin (ECOTRIN) 81 mg, Oral, Daily    folic acid (FOLVITE) 800 mcg, Oral, Daily    ondansetron (ZOFRAN) 4 mg, Oral, Daily PRN    prenatal vit no.124/iron/folic (PRENATAL VITAMIN ORAL) Oral, Daily    valACYclovir (VALTREX) 500 mg, Oral     Care team members:  Layo - Primary OB    OBJECTIVE:   /78 (BP Location: Left arm, Patient Position: Sitting, BP Method: Large (Automatic))   Ht 5' 6" (1.676 m)   Wt 99.1 kg (218 lb 7.6 oz)   LMP 2023 (Exact Date)   BMI 35.26 kg/m²     Ultrasound performed. See viewpoint for full ultrasound report.  A quadrichorionic/quadriamniotic gestation is identified. Cardiac activity seen x 4  All fetal sizes are appropriate for established gestational age. Detailed anatomic surveys are performed for all fetuses. No structural malformations are seen; however, the anatomic surveys are incomplete as noted above.   Placental locations are normal without evidence of previa.   Fetus D placental cord insertion is velamentous.  TV Cervical length is normal.     Significant labs/imaging:  N/A  ASSESSMENT/PLAN:   28 y.o.  female with IUP at 20w5d    Quadrachorionic quadra-amniotic quadruplet pregnancy in first trimester  We reviewed today's anatomy ultrasound of all four fetuses. See above  We briefly reviewed the risks " associated with higher order multifetal gestation once again.   Feeling well overall at this time, only with some exhaustion and general discomforts. Recommended starting pepcid, claritin for associated symptoms.    Recommendations:  Folic acid 1 mg daily and low-dose aspirin (81 mg daily) for preeclampsia risk reduction.    Consider early GDM screening  Growth ultrasounds every 3-4 weeks following detailed anatomic survey  Fetal echocardiogram scheduled for September 9  Can consider antepartum testing at 32 weeks, if not delivered  Given increased risks throughout pregnancy, consider routine prenatal visits every 2 weeks from 20-28 weeks; weekly visits thereafter.  Close monitoring for development of preeclampsia. See TN for specific recommendations  If admitted to the hospital for antepartum indications would recommend prophylactic Lovenox daily according to the [facility] thromboprophylaxis guidelines.     Delivery in the absence of maternal or fetal complications should occur in the 32-33 week.  Delivery timing may be adjusted depending on pregnancy course.     Chronic hypertension in pregnancy  Previously diagnosed with cHTN during last pregnancy. Currently on no meds. Overall asymptomatic.    Recommendations (Please refer to [facility] guidelines):  Continue aspirin 81 mg daily for preeclampsia risk reduction  No need for medications at this time.  Continue with CONNECTED MoMs  Baseline evaluation with primary OB as previously recommended.  Continued close observation of patient's blood pressures. Avoid hypotension as this has been associated with uteroplacental insufficiency.  Recommend treatment to a goal blood pressure < 140/90    F/u in 3-4 weeks for completion of anatomy    PATRICK Jean-Baptiste MD  Maternal Fetal Medicine Fellow   PGY-6       ATTENDING ATTESTATION  I have seen the patient and reviewed the Dr. Jean-Baptiste's consultation note, assessment and plan. I have personally spoken to and discussed plan  with the patient and agree with the findings. All changes made to the body of the note.    FOLLOW UP:   A follow up ultrasound was made for 4 weeks from today. A follow up MFDAPHNE MD visit was made for same day.    Patient was counseled that prenatal ultrasound studies have limitations. They do not detect all fetal, genetic, placental, and maternal abnormalities. A normal appearing prenatal ultrasound is reassuring. However, it does not guarantee the absence of an abnormality or predict a normal outcome for the fetus or the mother.     The patient was given an opportunity to ask questions about the management of her high risk pregnancy problems. She expressed an understanding of and agreement to the above impression and plan. All questions were answered to her satisfaction.    20 minutes of total time spent on the encounter, which includes face to face time and non-face to face time preparing to see the patient (eg, review of tests), obtaining and/or reviewing separately obtained history, documenting clinical information in the electronic or other health record, independently interpreting results (not separately reported) and communicating results to the patient/family/caregiver, or care coordination (not separately reported).        Ronny Hallman MD   Maternal-Fetal Medicine      Electronically Signed by Ronny Hallman August 29, 2024

## 2024-09-05 ENCOUNTER — TELEPHONE (OUTPATIENT)
Dept: PEDIATRIC CARDIOLOGY | Facility: CLINIC | Age: 29
End: 2024-09-05
Payer: OTHER GOVERNMENT

## 2024-09-06 ENCOUNTER — CLINICAL SUPPORT (OUTPATIENT)
Dept: PEDIATRIC CARDIOLOGY | Facility: CLINIC | Age: 29
End: 2024-09-06
Attending: OBSTETRICS & GYNECOLOGY
Payer: OTHER GOVERNMENT

## 2024-09-06 ENCOUNTER — OFFICE VISIT (OUTPATIENT)
Dept: PEDIATRIC CARDIOLOGY | Facility: CLINIC | Age: 29
End: 2024-09-06
Attending: PEDIATRICS
Payer: OTHER GOVERNMENT

## 2024-09-06 VITALS
SYSTOLIC BLOOD PRESSURE: 135 MMHG | BODY MASS INDEX: 34.92 KG/M2 | OXYGEN SATURATION: 100 % | HEART RATE: 97 BPM | WEIGHT: 216.38 LBS | DIASTOLIC BLOOD PRESSURE: 75 MMHG

## 2024-09-06 DIAGNOSIS — O30.201 QUADRUPLET GESTATION IN FIRST TRIMESTER, UNSPECIFIED MULTIPLE GESTATION TYPE: ICD-10-CM

## 2024-09-06 DIAGNOSIS — O30.231 QUADRACHORIONIC QUADRA-AMNIOTIC QUADRUPLET PREGNANCY IN FIRST TRIMESTER: Primary | ICD-10-CM

## 2024-09-06 PROCEDURE — 99999 PR PBB SHADOW E&M-EST. PATIENT-LVL III: CPT | Mod: PBBFAC,,, | Performed by: PEDIATRICS

## 2024-09-06 NOTE — PROGRESS NOTES
[facility]    Today, I had the pleasure of evaluating Eve Salazar who is now 29 y.o. and carrying her second pregnancy at 21 6/7 weeks gestation with an DENNIS of 25. She was referred for evaluation of the fetal heart due quadrachorionic, quadra-amniotic pregnancy, spontaneous.    She is carrying four female fetuses  Pregnancy thus far has been otherwise uncomplicated.    Obstetric History:    .  Her OB history is otherwise unremarkable.     Past Medical History:   Diagnosis Date    HSV (herpes simplex virus) anogenital infection          Current Outpatient Medications:     ascorbic acid, vitamin C, (VITAMIN C) 100 MG tablet, Take 100 mg by mouth once daily., Disp: , Rfl:     aspirin (ECOTRIN) 81 MG EC tablet, Take 1 tablet (81 mg total) by mouth once daily., Disp: 180 tablet, Rfl: 1    prenatal vit no.124/iron/folic (PRENATAL VITAMIN ORAL), Take by mouth Daily., Disp: , Rfl:     valACYclovir (VALTREX) 500 MG tablet, TAKE 1 TABLET(500 MG) BY MOUTH EVERY DAY, Disp: 90 tablet, Rfl: 0    folic acid (FOLVITE) 800 MCG Tab, Take 800 mcg by mouth once daily. (Patient not taking: Reported on 2024), Disp: , Rfl:     ondansetron (ZOFRAN) 4 MG tablet, Take 1 tablet (4 mg total) by mouth daily as needed for Nausea. (Patient not taking: Reported on 2024), Disp: 30 tablet, Rfl: 1     Review of patient's allergies indicates:  No Known Allergies    Family History: Negative for congenital heart disease, early coronary artery disease, sudden unexplained death, connective tissues disorders, genetic syndromes, multiple miscarriages or other congenital anomalies.    Social History: Ms. Eve Salazar is  to the father of the babies.  The father of the baby is involved.     FETAL ECHOCARDIOGRAM (summary):  Fetal echos at 21 6/7wga  Fetus A, maternal left, lower quadrant  Possible muscular VSD  Otherwise normal fetal echocardiogram.    Fetus B, maternal right,  lower quadrant.    There is a single, small echogenic focus on the mitral valve apparatus in the LV.  Otherwise normal fetal echocardiogram.     Fetus C, maternal left, upper quadrant   Normal fetal echocardiogram.     Fetus D, maternal right, upper quadrant    No indirect evidence of aortic arch obstruction.   Normal fetal patent ductus arteriosus.   Otherwise normal fetal echocardiogram.   (Full report in electronic medical record)    Impression:  Quadruplet active female fetus at 21 6/7 wga. Overall reassuring fetal echocardiograms.       On fetus A, there was a possible muscular ventricular septal defect.  Would recommend  echocardiogram     On fetus B, there is a single echogenic focus.  In the absence of other fetal anomalies, this does not need follow-up or a  echo.      Fetus C has a normal fetal echocardiogram.      Fetus D had difficult images and we were unable to completely evaluate the aortic arch.  The aortic valve and proximal left ventricular outflow tract appeared normal.  The ductus arteriosus is normal.  There is no indirect evidence of arch obstruction.  Low threshold for  echo.    I discussed with her that fetal echocardiography is insufficiently sensitive to rule out all septal defects, anomalies of pulmonary and systemic veins, arch anomalies, and some valvar abnormalities, nor can it ensure that the ductus arteriosus and foramen ovale will spontaneously close.     Recommendations:  Location, timing, and mode of delivery will be determined by the obstetrical team.  She does not require further follow-up in the fetal echocardiography clinic, but I would be happy to see her again if additional questions or concerns arise.    Should there be any concerns about the baby's heart after birth, a post- echocardiogram and cardiology consultation are recommended.           Megan Scruggs MD, MSCI  Pediatric Cardiology  Pediatric Echocardiography, Fetal  Echocardiography, Cardiac MRI  [facility] [address] [place]  [address]  Phone [phone], Fax [phone]      The above information was discussed in detail including the use of diagrams, with 30 minutes of total face to face time, with greater than 50% with counseling and coordination of care.  The discussion of the diagnosis and treatment options is as described above.

## 2024-09-09 ENCOUNTER — TELEPHONE (OUTPATIENT)
Dept: OBSTETRICS AND GYNECOLOGY | Facility: CLINIC | Age: 29
End: 2024-09-09
Payer: OTHER GOVERNMENT

## 2024-09-09 DIAGNOSIS — O30.231 QUADRACHORIONIC QUADRA-AMNIOTIC QUADRUPLET PREGNANCY IN FIRST TRIMESTER: Primary | ICD-10-CM

## 2024-09-09 NOTE — TELEPHONE ENCOUNTER
----- Message from Laura Vasques MD sent at 9/8/2024  5:16 PM CDT -----  Can we please request a C/S for this patient on 11/20 at 7 am?   Indication is Quads!  She will be 32w4d.   Thank you!

## 2024-09-18 ENCOUNTER — LAB VISIT (OUTPATIENT)
Dept: LAB | Facility: OTHER | Age: 29
End: 2024-09-18
Attending: OBSTETRICS & GYNECOLOGY
Payer: OTHER GOVERNMENT

## 2024-09-18 ENCOUNTER — ROUTINE PRENATAL (OUTPATIENT)
Dept: OBSTETRICS AND GYNECOLOGY | Facility: CLINIC | Age: 29
End: 2024-09-18
Payer: OTHER GOVERNMENT

## 2024-09-18 VITALS — WEIGHT: 222.44 LBS | DIASTOLIC BLOOD PRESSURE: 88 MMHG | SYSTOLIC BLOOD PRESSURE: 144 MMHG | BODY MASS INDEX: 35.9 KG/M2

## 2024-09-18 DIAGNOSIS — Z3A.23 23 WEEKS GESTATION OF PREGNANCY: Primary | ICD-10-CM

## 2024-09-18 DIAGNOSIS — Z3A.09 9 WEEKS GESTATION OF PREGNANCY: ICD-10-CM

## 2024-09-18 DIAGNOSIS — O16.2 ELEVATED BLOOD PRESSURE AFFECTING PREGNANCY IN SECOND TRIMESTER, ANTEPARTUM: ICD-10-CM

## 2024-09-18 LAB
ABO + RH BLD: NORMAL
ALBUMIN SERPL BCP-MCNC: 3.1 G/DL (ref 3.5–5.2)
ALP SERPL-CCNC: 66 U/L (ref 55–135)
ALT SERPL W/O P-5'-P-CCNC: 27 U/L (ref 10–44)
ANION GAP SERPL CALC-SCNC: 8 MMOL/L (ref 8–16)
AST SERPL-CCNC: 31 U/L (ref 10–40)
BASOPHILS # BLD AUTO: 0.02 K/UL (ref 0–0.2)
BASOPHILS NFR BLD: 0.2 % (ref 0–1.9)
BILIRUB SERPL-MCNC: 0.4 MG/DL (ref 0.1–1)
BLD GP AB SCN CELLS X3 SERPL QL: NORMAL
BUN SERPL-MCNC: 7 MG/DL (ref 6–20)
CALCIUM SERPL-MCNC: 10.2 MG/DL (ref 8.7–10.5)
CHLORIDE SERPL-SCNC: 105 MMOL/L (ref 95–110)
CO2 SERPL-SCNC: 22 MMOL/L (ref 23–29)
CREAT SERPL-MCNC: 0.7 MG/DL (ref 0.5–1.4)
CREAT UR-MCNC: 21 MG/DL (ref 15–325)
DIFFERENTIAL METHOD BLD: ABNORMAL
EOSINOPHIL # BLD AUTO: 0.3 K/UL (ref 0–0.5)
EOSINOPHIL NFR BLD: 2.5 % (ref 0–8)
ERYTHROCYTE [DISTWIDTH] IN BLOOD BY AUTOMATED COUNT: 12.9 % (ref 11.5–14.5)
EST. GFR  (NO RACE VARIABLE): >60 ML/MIN/1.73 M^2
GLUCOSE SERPL-MCNC: 68 MG/DL (ref 70–110)
HBV SURFACE AG SERPL QL IA: NORMAL
HCT VFR BLD AUTO: 33.2 % (ref 37–48.5)
HCV AB SERPL QL IA: NEGATIVE
HGB BLD-MCNC: 10.6 G/DL (ref 12–16)
HIV 1+2 AB+HIV1 P24 AG SERPL QL IA: NEGATIVE
IMM GRANULOCYTES # BLD AUTO: 0.11 K/UL (ref 0–0.04)
IMM GRANULOCYTES NFR BLD AUTO: 0.9 % (ref 0–0.5)
LYMPHOCYTES # BLD AUTO: 2.1 K/UL (ref 1–4.8)
LYMPHOCYTES NFR BLD: 17.7 % (ref 18–48)
MCH RBC QN AUTO: 28.9 PG (ref 27–31)
MCHC RBC AUTO-ENTMCNC: 31.9 G/DL (ref 32–36)
MCV RBC AUTO: 91 FL (ref 82–98)
MONOCYTES # BLD AUTO: 1.1 K/UL (ref 0.3–1)
MONOCYTES NFR BLD: 8.8 % (ref 4–15)
NEUTROPHILS # BLD AUTO: 8.4 K/UL (ref 1.8–7.7)
NEUTROPHILS NFR BLD: 69.9 % (ref 38–73)
NRBC BLD-RTO: 0 /100 WBC
PLATELET # BLD AUTO: 243 K/UL (ref 150–450)
PMV BLD AUTO: 10.4 FL (ref 9.2–12.9)
POTASSIUM SERPL-SCNC: 4.1 MMOL/L (ref 3.5–5.1)
PROT SERPL-MCNC: 7 G/DL (ref 6–8.4)
PROT UR-MCNC: <7 MG/DL (ref 0–15)
PROT/CREAT UR: NORMAL MG/G{CREAT} (ref 0–0.2)
RBC # BLD AUTO: 3.67 M/UL (ref 4–5.4)
SODIUM SERPL-SCNC: 135 MMOL/L (ref 136–145)
SPECIMEN OUTDATE: NORMAL
TREPONEMA PALLIDUM IGG+IGM AB [PRESENCE] IN SERUM OR PLASMA BY IMMUNOASSAY: NONREACTIVE
TSH SERPL DL<=0.005 MIU/L-ACNC: 0.84 UIU/ML (ref 0.4–4)
WBC # BLD AUTO: 11.97 K/UL (ref 3.9–12.7)

## 2024-09-18 PROCEDURE — 84443 ASSAY THYROID STIM HORMONE: CPT | Performed by: OBSTETRICS & GYNECOLOGY

## 2024-09-18 PROCEDURE — 87389 HIV-1 AG W/HIV-1&-2 AB AG IA: CPT | Performed by: OBSTETRICS & GYNECOLOGY

## 2024-09-18 PROCEDURE — 36415 COLL VENOUS BLD VENIPUNCTURE: CPT | Performed by: OBSTETRICS & GYNECOLOGY

## 2024-09-18 PROCEDURE — 0502F SUBSEQUENT PRENATAL CARE: CPT | Mod: S$GLB,,,

## 2024-09-18 PROCEDURE — 86803 HEPATITIS C AB TEST: CPT | Performed by: OBSTETRICS & GYNECOLOGY

## 2024-09-18 PROCEDURE — 82570 ASSAY OF URINE CREATININE: CPT

## 2024-09-18 PROCEDURE — 86593 SYPHILIS TEST NON-TREP QUANT: CPT | Performed by: OBSTETRICS & GYNECOLOGY

## 2024-09-18 PROCEDURE — 99999 PR PBB SHADOW E&M-EST. PATIENT-LVL II: CPT | Mod: PBBFAC,,,

## 2024-09-18 PROCEDURE — 86900 BLOOD TYPING SEROLOGIC ABO: CPT | Performed by: OBSTETRICS & GYNECOLOGY

## 2024-09-18 PROCEDURE — 86850 RBC ANTIBODY SCREEN: CPT | Performed by: OBSTETRICS & GYNECOLOGY

## 2024-09-18 PROCEDURE — 87340 HEPATITIS B SURFACE AG IA: CPT | Performed by: OBSTETRICS & GYNECOLOGY

## 2024-09-18 PROCEDURE — 86762 RUBELLA ANTIBODY: CPT | Performed by: OBSTETRICS & GYNECOLOGY

## 2024-09-18 PROCEDURE — 80053 COMPREHEN METABOLIC PANEL: CPT | Performed by: OBSTETRICS & GYNECOLOGY

## 2024-09-18 PROCEDURE — 85025 COMPLETE CBC W/AUTO DIFF WBC: CPT | Performed by: OBSTETRICS & GYNECOLOGY

## 2024-09-18 PROCEDURE — 84156 ASSAY OF PROTEIN URINE: CPT

## 2024-09-18 NOTE — PROGRESS NOTES
Here for routine OB appt at 23w4d. No complaints, denies VB and cramping. Good FM. V-scan today with +++ cardiac activity x4.   Pregnancy complicated by quadruplet pregnancy and cHTN w/o meds. Has seen MFM, anatomy scan normal, reviewed recs today. Growth scan and MFM visit scheduled for next week.   BP today elevated - 144/88. Pt denies HA's, vision changes, RUQ pain, or swelling. Will draw CMP and CBC today and collected baseline protein/creatinine ratio. Pt instructed to setup connected mom and monitor BP's.    Did not have initial OB labs done - will get those today as well. Plan for early GTT, pt will come back for that in the next week.   labor and preE precautions discussed with patient. ED precautions given.  RTC in 2 weeks.

## 2024-09-18 NOTE — Clinical Note
MAGALY.  Slightly elevated BP today.  Did baseline labs.  Instructed her to monitor and gave her precautions. Also, seeing MFM next week.  Did you want to see her the week after?  Per [facility] recs she's supposed to be seeing primary OB every 2 weeks at this point.

## 2024-09-19 LAB
RUBV IGG SER-ACNC: 24 IU/ML
RUBV IGG SER-IMP: REACTIVE

## 2024-09-21 ENCOUNTER — PATIENT MESSAGE (OUTPATIENT)
Dept: OTHER | Facility: OTHER | Age: 29
End: 2024-09-21
Payer: OTHER GOVERNMENT

## 2024-09-25 ENCOUNTER — LAB VISIT (OUTPATIENT)
Dept: LAB | Facility: OTHER | Age: 29
End: 2024-09-25
Attending: OBSTETRICS & GYNECOLOGY
Payer: OTHER GOVERNMENT

## 2024-09-25 ENCOUNTER — OFFICE VISIT (OUTPATIENT)
Dept: MATERNAL FETAL MEDICINE | Facility: CLINIC | Age: 29
End: 2024-09-25
Payer: OTHER GOVERNMENT

## 2024-09-25 ENCOUNTER — PROCEDURE VISIT (OUTPATIENT)
Dept: MATERNAL FETAL MEDICINE | Facility: CLINIC | Age: 29
End: 2024-09-25
Payer: OTHER GOVERNMENT

## 2024-09-25 VITALS — WEIGHT: 225 LBS | SYSTOLIC BLOOD PRESSURE: 142 MMHG | DIASTOLIC BLOOD PRESSURE: 78 MMHG | BODY MASS INDEX: 36.31 KG/M2

## 2024-09-25 DIAGNOSIS — O30.231 QUADRACHORIONIC QUADRA-AMNIOTIC QUADRUPLET PREGNANCY IN FIRST TRIMESTER: ICD-10-CM

## 2024-09-25 DIAGNOSIS — O30.201 QUADRUPLET GESTATION IN FIRST TRIMESTER, UNSPECIFIED MULTIPLE GESTATION TYPE: ICD-10-CM

## 2024-09-25 DIAGNOSIS — Z3A.17 17 WEEKS GESTATION OF PREGNANCY: ICD-10-CM

## 2024-09-25 DIAGNOSIS — O10.919 CHRONIC HYPERTENSION IN PREGNANCY: ICD-10-CM

## 2024-09-25 DIAGNOSIS — O16.2 ELEVATED BLOOD PRESSURE AFFECTING PREGNANCY IN SECOND TRIMESTER, ANTEPARTUM: Primary | ICD-10-CM

## 2024-09-25 LAB
BASOPHILS # BLD AUTO: 0.02 K/UL (ref 0–0.2)
BASOPHILS NFR BLD: 0.2 % (ref 0–1.9)
DIFFERENTIAL METHOD BLD: ABNORMAL
EOSINOPHIL # BLD AUTO: 0.2 K/UL (ref 0–0.5)
EOSINOPHIL NFR BLD: 1.8 % (ref 0–8)
ERYTHROCYTE [DISTWIDTH] IN BLOOD BY AUTOMATED COUNT: 12.9 % (ref 11.5–14.5)
GLUCOSE SERPL-MCNC: 150 MG/DL (ref 70–140)
HCT VFR BLD AUTO: 29.7 % (ref 37–48.5)
HGB BLD-MCNC: 9.5 G/DL (ref 12–16)
IMM GRANULOCYTES # BLD AUTO: 0.09 K/UL (ref 0–0.04)
IMM GRANULOCYTES NFR BLD AUTO: 0.8 % (ref 0–0.5)
LYMPHOCYTES # BLD AUTO: 2.1 K/UL (ref 1–4.8)
LYMPHOCYTES NFR BLD: 19.3 % (ref 18–48)
MCH RBC QN AUTO: 28.5 PG (ref 27–31)
MCHC RBC AUTO-ENTMCNC: 32 G/DL (ref 32–36)
MCV RBC AUTO: 89 FL (ref 82–98)
MONOCYTES # BLD AUTO: 0.7 K/UL (ref 0.3–1)
MONOCYTES NFR BLD: 6.2 % (ref 4–15)
NEUTROPHILS # BLD AUTO: 7.9 K/UL (ref 1.8–7.7)
NEUTROPHILS NFR BLD: 71.7 % (ref 38–73)
NRBC BLD-RTO: 0 /100 WBC
PLATELET # BLD AUTO: 207 K/UL (ref 150–450)
PMV BLD AUTO: 10.5 FL (ref 9.2–12.9)
RBC # BLD AUTO: 3.33 M/UL (ref 4–5.4)
WBC # BLD AUTO: 10.97 K/UL (ref 3.9–12.7)

## 2024-09-25 PROCEDURE — 99999 PR PBB SHADOW E&M-EST. PATIENT-LVL III: CPT | Mod: PBBFAC,,, | Performed by: OBSTETRICS & GYNECOLOGY

## 2024-09-25 PROCEDURE — 82950 GLUCOSE TEST: CPT | Performed by: OBSTETRICS & GYNECOLOGY

## 2024-09-25 PROCEDURE — 85025 COMPLETE CBC W/AUTO DIFF WBC: CPT | Performed by: OBSTETRICS & GYNECOLOGY

## 2024-09-25 PROCEDURE — 36415 COLL VENOUS BLD VENIPUNCTURE: CPT | Performed by: OBSTETRICS & GYNECOLOGY

## 2024-09-25 NOTE — Clinical Note
Here is the note from our most recent visit.BPs are starting go up a bit. May need to consider BP meds if still elevated at your next visit. Please let me know if you have any questions. [place]

## 2024-09-25 NOTE — ASSESSMENT & PLAN NOTE
Previously diagnosed with cHTN during last pregnancy. Currently on no meds. Overall asymptomatic.  BPs have been elevated during last week's visit and today.  Encouraged patient to perform Connected MoM pressures at home to determine range. Patient concerned this is all 'white coat hypertension'.  Counseled the patient about the possible need for meds if these BPs remain elevated.    Recommendations (Please refer to [facility] guidelines):  Continue aspirin 81 mg daily for preeclampsia risk reduction  If BPs remain elevated, may need to consider meds to control  Recommend treatment to a goal blood pressure < 140/90  Continue with CONNECTED MoMs  Continued close observation of patient's blood pressures. Avoid hypotension as this has been associated with uteroplacental insufficiency.

## 2024-09-25 NOTE — ASSESSMENT & PLAN NOTE
We reviewed today's anatomy ultrasound of all four fetuses. See above  We briefly reviewed the risks associated with higher order multifetal gestation.  Feeling well overall at this time, only with some exhaustion and general discomforts.     Recommendations:  Folic acid 1 mg daily and low-dose aspirin (81 mg daily) for preeclampsia risk reduction.    Growth ultrasounds every 4 weeks following detailed anatomic survey - next scheduled.  Can consider antepartum testing at 32 weeks, if not delivered  Given increased risks throughout pregnancy, consider routine prenatal visits every 2 weeks from 20-28 weeks; weekly visits thereafter.  Close monitoring for development of preeclampsia. See cHTN for specific recommendations  If admitted to the hospital for antepartum indications would recommend prophylactic Lovenox daily according to the [facility] thromboprophylaxis guidelines.     Delivery in the absence of maternal or fetal complications should occur in the 32-33 week.  Delivery timing may be adjusted depending on pregnancy course.

## 2024-09-25 NOTE — PROGRESS NOTES
Maternal Fetal Medicine Follow Up Consult    SUBJECTIVE:   Eve Salazar is a 29 y.o.  female with IUP at 24w4d who is seen in follow up consultation by MFM.  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 She is overall feeling well.  Patient denies any contractions/cramping, vaginal bleeding or leakage of fluid.  She reports good fetal movement x 4  Patient denies any headaches, blurry vision or RUQ pain.     Previous notes reviewed. No changes to medical, surgical, family, social, or obstetric history.  Medications:  Current Outpatient Medications   Medication Instructions    ascorbic acid (vitamin C) (VITAMIN C) 100 mg, Oral, Daily    aspirin (ECOTRIN) 81 mg, Oral, Daily    folic acid (FOLVITE) 800 mcg, Oral, Daily    ondansetron (ZOFRAN) 4 mg, Oral, Daily PRN    prenatal vit no.124/iron/folic (PRENATAL VITAMIN ORAL) Oral, Daily    valACYclovir (VALTREX) 500 mg, Oral     Care team members:  Layo - Primary OB    OBJECTIVE:   BP (!) 142/78 (BP Location: Right arm, Patient Position: Sitting, BP Method: Large (Manual))   Wt 102 kg (224 lb 15.7 oz)   LMP 2023 (Exact Date)   BMI 36.31 kg/m²     Ultrasound performed. See viewpoint for full ultrasound report.  Quadrichorionic-Quadriamniotic triplet pregnancy with cardiac activity seen in all four.    A = Fetal size is AGA with the EFW at the 50%.  AC is at the 35%.  Normal repeat limited fetal anatomic survey.   MVP is normal.   B = Fetal size is AGA with the EFW at the 49%. AC is at the 56%.  Normal repeat limited fetal anatomic survey. Some views remains suboptimally seen.  MVP is normal.    C = Fetal size is AGA with the EFW at the 43%. AC is at the 39%  Normal repeat limited fetal anatomic survey.  MVP is normal.  D = Fetal size is AGA with the EFW at the  36%. AC is at the 17%  Normal repeat limited fetal anatomic survey.  MVP is normal.    Largest growth discordance is 7.5%.     Significant labs/imaging:  N/A    ASSESSMENT/PLAN:   29 y.o.  female with IUP at 24w4d    Quadrachorionic quadra-amniotic quadruplet pregnancy in first trimester  We reviewed today's anatomy ultrasound of all four fetuses. See above  We briefly reviewed the risks associated with higher order multifetal gestation.  Feeling well overall at this time, only with some exhaustion and general discomforts.     Recommendations:  Folic acid 1 mg daily and low-dose aspirin (81 mg daily) for preeclampsia risk reduction.    Growth ultrasounds every 4 weeks following detailed anatomic survey - next scheduled.  Can consider antepartum testing at 32 weeks, if not delivered  Given increased risks throughout pregnancy, consider routine prenatal visits every 2 weeks from 20-28 weeks; weekly visits thereafter.  Close monitoring for development of preeclampsia. See cHTN for specific recommendations  If admitted to the hospital for antepartum indications would recommend prophylactic Lovenox daily according to the [facility] thromboprophylaxis guidelines.     Delivery in the absence of maternal or fetal complications should occur in the 32-33 week.  Delivery timing may be adjusted depending on pregnancy course.       Chronic hypertension in pregnancy  Previously diagnosed with cHTN during last pregnancy. Currently on no meds. Overall asymptomatic.  BPs have been elevated during last week's visit and today.  Encouraged patient to perform Connected MoM pressures at home to determine range. Patient concerned this is all 'white coat hypertension'.  Counseled the patient about the possible need for meds if these BPs remain elevated.    Recommendations (Please refer to [facility] guidelines):  Continue aspirin 81 mg daily for preeclampsia risk reduction  If BPs remain elevated, may need to consider meds to  control  Recommend treatment to a goal blood pressure < 140/90  Continue with CONNECTED MoMs  Continued close observation of patient's blood pressures. Avoid hypotension as this has been associated with uteroplacental insufficiency.      FOLLOW UP:   A follow up ultrasound was made for 4 weeks from today. A follow up MFDAPHNE MD visit was made for same day.    Patient was counseled that prenatal ultrasound studies have limitations. They do not detect all fetal, genetic, placental, and maternal abnormalities. A normal appearing prenatal ultrasound is reassuring. However, it does not guarantee the absence of an abnormality or predict a normal outcome for the fetus or the mother.     The patient was given an opportunity to ask questions about the management of her high risk pregnancy problems. She expressed an understanding of and agreement to the above impression and plan. All questions were answered to her satisfaction.    30 minutes of total time spent on the encounter, which includes face to face time and non-face to face time preparing to see the patient (eg, review of tests), obtaining and/or reviewing separately obtained history, documenting clinical information in the electronic or other health record, independently interpreting results (not separately reported) and communicating results to the patient/family/caregiver, or care coordination (not separately reported).        Ronny Hallman MD   Maternal-Fetal Medicine      Electronically Signed by Ronny Hallman September 25, 2024

## 2024-09-26 ENCOUNTER — TELEPHONE (OUTPATIENT)
Dept: OBSTETRICS AND GYNECOLOGY | Facility: CLINIC | Age: 29
End: 2024-09-26
Payer: OTHER GOVERNMENT

## 2024-09-26 DIAGNOSIS — R89.9 ABNORMAL LABORATORY TEST RESULT: Primary | ICD-10-CM

## 2024-09-26 DIAGNOSIS — Z36.2 ENCOUNTER FOR FOLLOW-UP ULTRASOUND OF FETAL ANATOMY: Primary | ICD-10-CM

## 2024-09-26 NOTE — TELEPHONE ENCOUNTER
----- Message from Madie Dougherty MD sent at 9/26/2024  9:02 AM CDT -----  Call patient. Needs 3 hr GTT ASAP. Please order under [facility].

## 2024-09-30 ENCOUNTER — LAB VISIT (OUTPATIENT)
Dept: LAB | Facility: OTHER | Age: 29
End: 2024-09-30
Attending: OBSTETRICS & GYNECOLOGY
Payer: OTHER GOVERNMENT

## 2024-09-30 DIAGNOSIS — R89.9 ABNORMAL LABORATORY TEST RESULT: ICD-10-CM

## 2024-09-30 LAB
GLUCOSE SERPL-MCNC: 100 MG/DL
GLUCOSE SERPL-MCNC: 137 MG/DL
GLUCOSE SERPL-MCNC: 162 MG/DL
GLUCOSE SERPL-MCNC: 76 MG/DL (ref 70–110)

## 2024-09-30 PROCEDURE — 82951 GLUCOSE TOLERANCE TEST (GTT): CPT | Performed by: OBSTETRICS & GYNECOLOGY

## 2024-09-30 PROCEDURE — 36415 COLL VENOUS BLD VENIPUNCTURE: CPT | Performed by: OBSTETRICS & GYNECOLOGY

## 2024-09-30 PROCEDURE — 82952 GTT-ADDED SAMPLES: CPT | Performed by: OBSTETRICS & GYNECOLOGY

## 2024-10-02 ENCOUNTER — ROUTINE PRENATAL (OUTPATIENT)
Dept: OBSTETRICS AND GYNECOLOGY | Facility: CLINIC | Age: 29
End: 2024-10-02
Payer: OTHER GOVERNMENT

## 2024-10-02 VITALS — BODY MASS INDEX: 36.7 KG/M2 | SYSTOLIC BLOOD PRESSURE: 142 MMHG | WEIGHT: 227.38 LBS | DIASTOLIC BLOOD PRESSURE: 86 MMHG

## 2024-10-02 DIAGNOSIS — O98.519 HERPES SIMPLEX TYPE 2 (HSV-2) INFECTION AFFECTING PREGNANCY, ANTEPARTUM: ICD-10-CM

## 2024-10-02 DIAGNOSIS — Z3A.25 25 WEEKS GESTATION OF PREGNANCY: ICD-10-CM

## 2024-10-02 DIAGNOSIS — O30.232: Primary | ICD-10-CM

## 2024-10-02 DIAGNOSIS — B00.9 HERPES SIMPLEX TYPE 2 (HSV-2) INFECTION AFFECTING PREGNANCY, ANTEPARTUM: ICD-10-CM

## 2024-10-02 DIAGNOSIS — O10.919 CHRONIC HYPERTENSION DURING PREGNANCY: ICD-10-CM

## 2024-10-02 PROCEDURE — 99999 PR PBB SHADOW E&M-EST. PATIENT-LVL II: CPT | Mod: PBBFAC,,, | Performed by: OBSTETRICS & GYNECOLOGY

## 2024-10-02 PROCEDURE — 0502F SUBSEQUENT PRENATAL CARE: CPT | Mod: S$GLB,,, | Performed by: OBSTETRICS & GYNECOLOGY

## 2024-10-05 ENCOUNTER — PATIENT MESSAGE (OUTPATIENT)
Dept: OTHER | Facility: OTHER | Age: 29
End: 2024-10-05
Payer: OTHER GOVERNMENT

## 2024-10-16 ENCOUNTER — ROUTINE PRENATAL (OUTPATIENT)
Dept: OBSTETRICS AND GYNECOLOGY | Facility: CLINIC | Age: 29
End: 2024-10-16
Payer: OTHER GOVERNMENT

## 2024-10-16 VITALS
WEIGHT: 232.25 LBS | BODY MASS INDEX: 37.49 KG/M2 | SYSTOLIC BLOOD PRESSURE: 134 MMHG | DIASTOLIC BLOOD PRESSURE: 89 MMHG

## 2024-10-16 DIAGNOSIS — O30.232: Primary | ICD-10-CM

## 2024-10-16 DIAGNOSIS — O98.519 HERPES SIMPLEX TYPE 2 (HSV-2) INFECTION AFFECTING PREGNANCY, ANTEPARTUM: ICD-10-CM

## 2024-10-16 DIAGNOSIS — B00.9 HERPES SIMPLEX TYPE 2 (HSV-2) INFECTION AFFECTING PREGNANCY, ANTEPARTUM: ICD-10-CM

## 2024-10-16 DIAGNOSIS — Z3A.27 27 WEEKS GESTATION OF PREGNANCY: ICD-10-CM

## 2024-10-16 DIAGNOSIS — O10.919 CHRONIC HYPERTENSION DURING PREGNANCY: ICD-10-CM

## 2024-10-16 PROCEDURE — 90715 TDAP VACCINE 7 YRS/> IM: CPT | Mod: S$GLB,,, | Performed by: OBSTETRICS & GYNECOLOGY

## 2024-10-16 PROCEDURE — 90471 IMMUNIZATION ADMIN: CPT | Mod: S$GLB,,, | Performed by: OBSTETRICS & GYNECOLOGY

## 2024-10-16 PROCEDURE — 0502F SUBSEQUENT PRENATAL CARE: CPT | Mod: S$GLB,,, | Performed by: OBSTETRICS & GYNECOLOGY

## 2024-10-16 PROCEDURE — 99999 PR PBB SHADOW E&M-EST. PATIENT-LVL III: CPT | Mod: PBBFAC,,, | Performed by: OBSTETRICS & GYNECOLOGY

## 2024-10-19 ENCOUNTER — PATIENT MESSAGE (OUTPATIENT)
Dept: OTHER | Facility: OTHER | Age: 29
End: 2024-10-19
Payer: OTHER GOVERNMENT

## 2024-10-23 ENCOUNTER — OFFICE VISIT (OUTPATIENT)
Dept: MATERNAL FETAL MEDICINE | Facility: CLINIC | Age: 29
End: 2024-10-23
Payer: OTHER GOVERNMENT

## 2024-10-23 ENCOUNTER — ROUTINE PRENATAL (OUTPATIENT)
Dept: OBSTETRICS AND GYNECOLOGY | Facility: CLINIC | Age: 29
End: 2024-10-23
Payer: OTHER GOVERNMENT

## 2024-10-23 ENCOUNTER — PROCEDURE VISIT (OUTPATIENT)
Dept: MATERNAL FETAL MEDICINE | Facility: CLINIC | Age: 29
End: 2024-10-23
Payer: OTHER GOVERNMENT

## 2024-10-23 VITALS
DIASTOLIC BLOOD PRESSURE: 84 MMHG | BODY MASS INDEX: 37.75 KG/M2 | SYSTOLIC BLOOD PRESSURE: 114 MMHG | WEIGHT: 233.94 LBS

## 2024-10-23 VITALS
DIASTOLIC BLOOD PRESSURE: 84 MMHG | SYSTOLIC BLOOD PRESSURE: 114 MMHG | WEIGHT: 234.25 LBS | BODY MASS INDEX: 37.81 KG/M2

## 2024-10-23 DIAGNOSIS — O10.919 CHRONIC HYPERTENSION IN PREGNANCY: ICD-10-CM

## 2024-10-23 DIAGNOSIS — Z3A.28 28 WEEKS GESTATION OF PREGNANCY: ICD-10-CM

## 2024-10-23 DIAGNOSIS — O30.231 QUADRACHORIONIC QUADRA-AMNIOTIC QUADRUPLET PREGNANCY IN FIRST TRIMESTER: ICD-10-CM

## 2024-10-23 DIAGNOSIS — O30.233: Primary | ICD-10-CM

## 2024-10-23 DIAGNOSIS — Z36.89 ENCOUNTER FOR ULTRASOUND TO ASSESS FETAL GROWTH: Primary | ICD-10-CM

## 2024-10-23 DIAGNOSIS — Z36.2 ENCOUNTER FOR FOLLOW-UP ULTRASOUND OF FETAL ANATOMY: ICD-10-CM

## 2024-10-23 PROCEDURE — 99999 PR PBB SHADOW E&M-EST. PATIENT-LVL II: CPT | Mod: PBBFAC,,, | Performed by: OBSTETRICS & GYNECOLOGY

## 2024-10-23 PROCEDURE — 99213 OFFICE O/P EST LOW 20 MIN: CPT | Mod: S$GLB,,, | Performed by: OBSTETRICS & GYNECOLOGY

## 2024-10-23 PROCEDURE — 0502F SUBSEQUENT PRENATAL CARE: CPT | Mod: S$GLB,,, | Performed by: OBSTETRICS & GYNECOLOGY

## 2024-10-23 PROCEDURE — 99999 PR PBB SHADOW E&M-EST. PATIENT-LVL III: CPT | Mod: PBBFAC,,, | Performed by: OBSTETRICS & GYNECOLOGY

## 2024-10-23 PROCEDURE — 76816 OB US FOLLOW-UP PER FETUS: CPT | Mod: 59,S$GLB,, | Performed by: OBSTETRICS & GYNECOLOGY

## 2024-10-23 RX ORDER — FAMOTIDINE 40 MG/1
40 TABLET, FILM COATED ORAL DAILY
Qty: 90 TABLET | Refills: 3 | Status: SHIPPED | OUTPATIENT
Start: 2024-10-23 | End: 2025-10-23

## 2024-10-23 NOTE — PROGRESS NOTES
28w4d without major complaints.   R/B/A  delivery and bilateral tubal ligation, operative delivery, and blood transfusion discussed today. All questions answered and patient voices understanding. Consents signed.   GBS, HIV, RPR, CBC collected.   Labor precautions discussed.   RTC in 1 week for routine PNC.

## 2024-10-23 NOTE — ASSESSMENT & PLAN NOTE
We reviewed today's growth assessment of quad/quad pregnancy. Will continue to monitor with q3 week assessment - reviewed lagging AC on Quad A, but that criteria for FGR not met.     We briefly reviewed the risks associated with higher order multifetal gestation.  Feeling well overall at this time, reports difficulty with heartburn. Will send rx for pepcid.    Recommendations:  Folic acid 1 mg daily and low-dose aspirin (81 mg daily) for preeclampsia risk reduction.    Growth ultrasounds every 3 weeks - next scheduled   Can consider antepartum testing at 32 weeks, if not delivered  Given increased risks throughout pregnancy, consider routine prenatal visits every 2 weeks from 20-28 weeks; weekly visits thereafter.  Close monitoring for development of preeclampsia. See cHTN for specific recommendations  If admitted to the hospital for antepartum indications would recommend prophylactic Lovenox daily according to the [facility] thromboprophylaxis guidelines.     Delivery in the absence of maternal or fetal complications should occur in the 32-33 week.  Delivery timing may be adjusted depending on pregnancy course. Currently scheduled for CS at 11/20.

## 2024-10-23 NOTE — PROGRESS NOTES
Maternal Fetal Medicine follow up consult    SUBJECTIVE:     Eve Salazar is a 29 y.o.  female with IUP at 28w4d who is seen in follow up consultation by MFM.  Pregnancy complications include:   Problem   Quadrachorionic Quadra-Amniotic Quadruplet Pregnancy in First Trimester   Chronic Hypertension in Pregnancy       Previous notes reviewed.   No changes to medical, surgical, family, social, or obstetric history.    Interval history since last MFM visit:   Overall doing well. CMOM reviewed - BP have been predominantly on target. Denies s/sxs of pree. Complains of heartburn refractory to Tums.     Medications reviewed.    Care team members:  Dr. Vasques - Primary OB     OBJECTIVE:   /84 (BP Location: Left arm, Patient Position: Sitting)   Wt 106.1 kg (233 lb 14.5 oz)   LMP 2023 (Exact Date)   BMI 37.75 kg/m²     Ultrasound performed. See viewpoint for full ultrasound report.  Quadrichorionic-Quadriamniotic pregnancy with cardiac activity seen in all four.    A = Fetal size is AGA with the EFW at the 26%.  AC is at the 2%.  Normal repeat limited fetal anatomic survey.   MVP is normal.   B = Fetal size is AGA with the EFW at the 42%. AC is at the 41%.  Normal repeat limited fetal anatomic survey. Some views remains suboptimally seen.  MVP is consistent with mild polyhydramnios .  C = Fetal size is AGA with the EFW at the 43%. AC is at the 33%  Normal repeat limited fetal anatomic survey.  MVP is normal.  D = Fetal size is AGA with the EFW at the 37%. AC is at the 32%  Normal repeat limited fetal anatomic survey.   MVP is normal.   Known velamentous cord insertion    Significant labs/imaging:  None    ASSESSMENT/PLAN:     29 y.o.  female with IUP at 28w4d    Quadrachorionic quadra-amniotic quadruplet pregnancy in first trimester  We reviewed today's growth assessment of quad/quad pregnancy. Will continue to monitor with q3 week assessment - reviewed lagging AC on Quad A, but that  criteria for FGR not met.     We briefly reviewed the risks associated with higher order multifetal gestation.  Feeling well overall at this time, reports difficulty with heartburn. Will send rx for pepcid.    Recommendations:  Folic acid 1 mg daily and low-dose aspirin (81 mg daily) for preeclampsia risk reduction.    Growth ultrasounds every 3 weeks - next scheduled   Can consider antepartum testing at 32 weeks, if not delivered  Given increased risks throughout pregnancy, consider routine prenatal visits every 2 weeks from 20-28 weeks; weekly visits thereafter.  Close monitoring for development of preeclampsia. See [place] for specific recommendations  If admitted to the hospital for antepartum indications would recommend prophylactic Lovenox daily according to the [facility] thromboprophylaxis guidelines.     Delivery in the absence of maternal or fetal complications should occur in the 32-33 week.  Delivery timing may be adjusted depending on pregnancy course. Currently scheduled for CS at 11/20.      Chronic hypertension in pregnancy  Previously diagnosed with cHTN during last pregnancy. Currently on no meds. Overall asymptomatic.  CMOM BP reviewed, overall on target. Denies s/sxs of pree.     Recommendations (Please refer to [facility] guidelines):  Continue aspirin 81 mg daily for preeclampsia risk reduction  If BPs elevated, may need to consider meds to control  Recommend treatment to a goal blood pressure < 140/90  Continue with CONNECTED MoMs  Continued close observation of patient's blood pressures. Avoid hypotension as this has been associated with uteroplacental insufficiency.    F/u in 3 weeks for [facility] visit    Nyasia Davila MD  PGY 7  Maternal Fetal Medicine  [facility]]

## 2024-10-23 NOTE — Clinical Note
Here is the note from our most recent visit. I would consider putting her on Valtrex prophylaxis starting now. Please let me know if you have any questions. [place]

## 2024-10-23 NOTE — ASSESSMENT & PLAN NOTE
Previously diagnosed with cHTN during last pregnancy. Currently on no meds. Overall asymptomatic.  CMOM BP reviewed, overall on target. Denies s/sxs of pree.     Recommendations (Please refer to [facility] guidelines):  Continue aspirin 81 mg daily for preeclampsia risk reduction  If BPs elevated, may need to consider meds to control  Recommend treatment to a goal blood pressure < 140/90  Continue with CONNECTED MoMs  Continued close observation of patient's blood pressures. Avoid hypotension as this has been associated with uteroplacental insufficiency.

## 2024-10-29 ENCOUNTER — PATIENT MESSAGE (OUTPATIENT)
Dept: OBSTETRICS AND GYNECOLOGY | Facility: CLINIC | Age: 29
End: 2024-10-29
Payer: OTHER GOVERNMENT

## 2024-10-29 DIAGNOSIS — A60.9 HSV (HERPES SIMPLEX VIRUS) ANOGENITAL INFECTION: ICD-10-CM

## 2024-10-30 ENCOUNTER — LAB VISIT (OUTPATIENT)
Dept: LAB | Facility: OTHER | Age: 29
End: 2024-10-30
Attending: OBSTETRICS & GYNECOLOGY
Payer: OTHER GOVERNMENT

## 2024-10-30 ENCOUNTER — ROUTINE PRENATAL (OUTPATIENT)
Dept: OBSTETRICS AND GYNECOLOGY | Facility: CLINIC | Age: 29
End: 2024-10-30
Payer: OTHER GOVERNMENT

## 2024-10-30 VITALS
DIASTOLIC BLOOD PRESSURE: 87 MMHG | SYSTOLIC BLOOD PRESSURE: 137 MMHG | HEART RATE: 101 BPM | BODY MASS INDEX: 37.52 KG/M2 | WEIGHT: 232.5 LBS

## 2024-10-30 DIAGNOSIS — A60.9 HSV (HERPES SIMPLEX VIRUS) ANOGENITAL INFECTION: ICD-10-CM

## 2024-10-30 DIAGNOSIS — Z3A.29 29 WEEKS GESTATION OF PREGNANCY: ICD-10-CM

## 2024-10-30 DIAGNOSIS — O30.233: ICD-10-CM

## 2024-10-30 DIAGNOSIS — O30.233: Primary | ICD-10-CM

## 2024-10-30 LAB
BASOPHILS # BLD AUTO: 0.02 K/UL (ref 0–0.2)
BASOPHILS NFR BLD: 0.2 % (ref 0–1.9)
DIFFERENTIAL METHOD BLD: ABNORMAL
EOSINOPHIL # BLD AUTO: 0.1 K/UL (ref 0–0.5)
EOSINOPHIL NFR BLD: 1.5 % (ref 0–8)
ERYTHROCYTE [DISTWIDTH] IN BLOOD BY AUTOMATED COUNT: 13.5 % (ref 11.5–14.5)
HCT VFR BLD AUTO: 28.5 % (ref 37–48.5)
HGB BLD-MCNC: 9.2 G/DL (ref 12–16)
HIV 1+2 AB+HIV1 P24 AG SERPL QL IA: NEGATIVE
IMM GRANULOCYTES # BLD AUTO: 0.05 K/UL (ref 0–0.04)
IMM GRANULOCYTES NFR BLD AUTO: 0.5 % (ref 0–0.5)
LYMPHOCYTES # BLD AUTO: 1.9 K/UL (ref 1–4.8)
LYMPHOCYTES NFR BLD: 20 % (ref 18–48)
MCH RBC QN AUTO: 26.4 PG (ref 27–31)
MCHC RBC AUTO-ENTMCNC: 32.3 G/DL (ref 32–36)
MCV RBC AUTO: 82 FL (ref 82–98)
MONOCYTES # BLD AUTO: 0.9 K/UL (ref 0.3–1)
MONOCYTES NFR BLD: 8.9 % (ref 4–15)
NEUTROPHILS # BLD AUTO: 6.6 K/UL (ref 1.8–7.7)
NEUTROPHILS NFR BLD: 68.9 % (ref 38–73)
NRBC BLD-RTO: 0 /100 WBC
PLATELET # BLD AUTO: 206 K/UL (ref 150–450)
PMV BLD AUTO: 10.7 FL (ref 9.2–12.9)
RBC # BLD AUTO: 3.49 M/UL (ref 4–5.4)
TREPONEMA PALLIDUM IGG+IGM AB [PRESENCE] IN SERUM OR PLASMA BY IMMUNOASSAY: NONREACTIVE
WBC # BLD AUTO: 9.54 K/UL (ref 3.9–12.7)

## 2024-10-30 PROCEDURE — 87653 STREP B DNA AMP PROBE: CPT | Performed by: OBSTETRICS & GYNECOLOGY

## 2024-10-30 PROCEDURE — 86593 SYPHILIS TEST NON-TREP QUANT: CPT | Performed by: OBSTETRICS & GYNECOLOGY

## 2024-10-30 PROCEDURE — 0502F SUBSEQUENT PRENATAL CARE: CPT | Mod: S$GLB,,, | Performed by: OBSTETRICS & GYNECOLOGY

## 2024-10-30 PROCEDURE — 87389 HIV-1 AG W/HIV-1&-2 AB AG IA: CPT | Performed by: OBSTETRICS & GYNECOLOGY

## 2024-10-30 PROCEDURE — 99999 PR PBB SHADOW E&M-EST. PATIENT-LVL III: CPT | Mod: PBBFAC,,, | Performed by: OBSTETRICS & GYNECOLOGY

## 2024-10-30 PROCEDURE — 85025 COMPLETE CBC W/AUTO DIFF WBC: CPT | Performed by: OBSTETRICS & GYNECOLOGY

## 2024-10-30 PROCEDURE — 36415 COLL VENOUS BLD VENIPUNCTURE: CPT | Performed by: OBSTETRICS & GYNECOLOGY

## 2024-10-30 RX ORDER — VALACYCLOVIR HYDROCHLORIDE 500 MG/1
500 TABLET, FILM COATED ORAL 2 TIMES DAILY
Qty: 90 TABLET | Refills: 0 | Status: ON HOLD | OUTPATIENT
Start: 2024-10-30

## 2024-11-01 LAB — GROUP B STREPTOCOCCUS, PCR: POSITIVE

## 2024-11-02 ENCOUNTER — HOSPITAL ENCOUNTER (INPATIENT)
Facility: OTHER | Age: 29
LOS: 4 days | Discharge: HOME | DRG: 832 | End: 2024-11-06
Attending: STUDENT IN AN ORGANIZED HEALTH CARE EDUCATION/TRAINING PROGRAM | Admitting: OBSTETRICS & GYNECOLOGY
Payer: OTHER GOVERNMENT

## 2024-11-02 ENCOUNTER — PATIENT MESSAGE (OUTPATIENT)
Dept: OTHER | Facility: OTHER | Age: 29
End: 2024-11-02
Payer: OTHER GOVERNMENT

## 2024-11-02 ENCOUNTER — TELEPHONE (OUTPATIENT)
Dept: OBSTETRICS AND GYNECOLOGY | Facility: OTHER | Age: 29
End: 2024-11-02
Payer: OTHER GOVERNMENT

## 2024-11-02 DIAGNOSIS — O30.233: ICD-10-CM

## 2024-11-02 DIAGNOSIS — R11.2 NAUSEA AND VOMITING, UNSPECIFIED VOMITING TYPE: ICD-10-CM

## 2024-11-02 DIAGNOSIS — Z3A.30 30 WEEKS GESTATION OF PREGNANCY: ICD-10-CM

## 2024-11-02 DIAGNOSIS — O30.231 QUADRACHORIONIC QUADRA-AMNIOTIC QUADRUPLET PREGNANCY IN FIRST TRIMESTER: ICD-10-CM

## 2024-11-02 PROBLEM — O60.00 PRETERM LABOR: Status: ACTIVE | Noted: 2024-11-02

## 2024-11-02 LAB
ABO + RH BLD: NORMAL
ALBUMIN SERPL BCP-MCNC: 3 G/DL (ref 3.5–5.2)
ALP SERPL-CCNC: 96 U/L (ref 40–150)
ALT SERPL W/O P-5'-P-CCNC: 25 U/L (ref 10–44)
ANION GAP SERPL CALC-SCNC: 16 MMOL/L (ref 8–16)
AST SERPL-CCNC: 37 U/L (ref 10–40)
BASOPHILS # BLD AUTO: 0.02 K/UL (ref 0–0.2)
BASOPHILS NFR BLD: 0.2 % (ref 0–1.9)
BILIRUB SERPL-MCNC: 0.6 MG/DL (ref 0.1–1)
BLD GP AB SCN CELLS X3 SERPL QL: NORMAL
BUN SERPL-MCNC: 9 MG/DL (ref 6–20)
CALCIUM SERPL-MCNC: 8.4 MG/DL (ref 8.7–10.5)
CHLORIDE SERPL-SCNC: 110 MMOL/L (ref 95–110)
CO2 SERPL-SCNC: 15 MMOL/L (ref 23–29)
CREAT SERPL-MCNC: 0.6 MG/DL (ref 0.5–1.4)
DIFFERENTIAL METHOD BLD: ABNORMAL
EOSINOPHIL # BLD AUTO: 0.1 K/UL (ref 0–0.5)
EOSINOPHIL NFR BLD: 0.5 % (ref 0–8)
ERYTHROCYTE [DISTWIDTH] IN BLOOD BY AUTOMATED COUNT: 13.8 % (ref 11.5–14.5)
EST. GFR  (NO RACE VARIABLE): >60 ML/MIN/1.73 M^2
GLUCOSE SERPL-MCNC: 65 MG/DL (ref 70–110)
HCT VFR BLD AUTO: 32.1 % (ref 37–48.5)
HGB BLD-MCNC: 10.4 G/DL (ref 12–16)
IMM GRANULOCYTES # BLD AUTO: 0.09 K/UL (ref 0–0.04)
IMM GRANULOCYTES NFR BLD AUTO: 0.8 % (ref 0–0.5)
INFLUENZA A, MOLECULAR: NEGATIVE
INFLUENZA B, MOLECULAR: NEGATIVE
LYMPHOCYTES # BLD AUTO: 0.5 K/UL (ref 1–4.8)
LYMPHOCYTES NFR BLD: 4.5 % (ref 18–48)
MAGNESIUM SERPL-MCNC: 1.8 MG/DL (ref 1.6–2.6)
MCH RBC QN AUTO: 26.5 PG (ref 27–31)
MCHC RBC AUTO-ENTMCNC: 32.4 G/DL (ref 32–36)
MCV RBC AUTO: 82 FL (ref 82–98)
MONOCYTES # BLD AUTO: 0.7 K/UL (ref 0.3–1)
MONOCYTES NFR BLD: 6.1 % (ref 4–15)
NEUTROPHILS # BLD AUTO: 10.2 K/UL (ref 1.8–7.7)
NEUTROPHILS NFR BLD: 87.9 % (ref 38–73)
NRBC BLD-RTO: 0 /100 WBC
PHOSPHATE SERPL-MCNC: 3 MG/DL (ref 2.7–4.5)
PLATELET # BLD AUTO: 220 K/UL (ref 150–450)
PLATELET BLD QL SMEAR: ABNORMAL
PMV BLD AUTO: 11.6 FL (ref 9.2–12.9)
POTASSIUM SERPL-SCNC: 4.4 MMOL/L (ref 3.5–5.1)
PROT SERPL-MCNC: 6.5 G/DL (ref 6–8.4)
RBC # BLD AUTO: 3.93 M/UL (ref 4–5.4)
SARS-COV-2 RDRP RESP QL NAA+PROBE: NEGATIVE
SODIUM SERPL-SCNC: 141 MMOL/L (ref 136–145)
SPECIMEN OUTDATE: NORMAL
SPECIMEN SOURCE: NORMAL
TREPONEMA PALLIDUM IGG+IGM AB [PRESENCE] IN SERUM OR PLASMA BY IMMUNOASSAY: NONREACTIVE
WBC # BLD AUTO: 11.6 K/UL (ref 3.9–12.7)

## 2024-11-02 PROCEDURE — 86901 BLOOD TYPING SEROLOGIC RH(D): CPT

## 2024-11-02 PROCEDURE — 59025 FETAL NON-STRESS TEST: CPT

## 2024-11-02 PROCEDURE — 86593 SYPHILIS TEST NON-TREP QUANT: CPT | Performed by: OBSTETRICS & GYNECOLOGY

## 2024-11-02 PROCEDURE — 84100 ASSAY OF PHOSPHORUS: CPT

## 2024-11-02 PROCEDURE — 83735 ASSAY OF MAGNESIUM: CPT

## 2024-11-02 PROCEDURE — 25000003 PHARM REV CODE 250: Performed by: STUDENT IN AN ORGANIZED HEALTH CARE EDUCATION/TRAINING PROGRAM

## 2024-11-02 PROCEDURE — 4A0HXCZ MEASUREMENT OF PRODUCTS OF CONCEPTION, CARDIAC RATE, EXTERNAL APPROACH: ICD-10-PCS | Performed by: STUDENT IN AN ORGANIZED HEALTH CARE EDUCATION/TRAINING PROGRAM

## 2024-11-02 PROCEDURE — 99223 1ST HOSP IP/OBS HIGH 75: CPT | Mod: 25,,, | Performed by: OBSTETRICS & GYNECOLOGY

## 2024-11-02 PROCEDURE — 63600175 PHARM REV CODE 636 W HCPCS: Performed by: STUDENT IN AN ORGANIZED HEALTH CARE EDUCATION/TRAINING PROGRAM

## 2024-11-02 PROCEDURE — 85025 COMPLETE CBC W/AUTO DIFF WBC: CPT

## 2024-11-02 PROCEDURE — 80053 COMPREHEN METABOLIC PANEL: CPT

## 2024-11-02 PROCEDURE — 11000001 HC ACUTE MED/SURG PRIVATE ROOM

## 2024-11-02 PROCEDURE — 99285 EMERGENCY DEPT VISIT HI MDM: CPT | Mod: 25

## 2024-11-02 PROCEDURE — 87635 SARS-COV-2 COVID-19 AMP PRB: CPT

## 2024-11-02 PROCEDURE — 87502 INFLUENZA DNA AMP PROBE: CPT

## 2024-11-02 PROCEDURE — 59025 FETAL NON-STRESS TEST: CPT | Mod: 26,59,, | Performed by: STUDENT IN AN ORGANIZED HEALTH CARE EDUCATION/TRAINING PROGRAM

## 2024-11-02 PROCEDURE — 25000003 PHARM REV CODE 250

## 2024-11-02 PROCEDURE — 99285 EMERGENCY DEPT VISIT HI MDM: CPT | Mod: 25,,, | Performed by: STUDENT IN AN ORGANIZED HEALTH CARE EDUCATION/TRAINING PROGRAM

## 2024-11-02 PROCEDURE — 96372 THER/PROPH/DIAG INJ SC/IM: CPT | Performed by: STUDENT IN AN ORGANIZED HEALTH CARE EDUCATION/TRAINING PROGRAM

## 2024-11-02 RX ORDER — ASPIRIN 81 MG/1
81 TABLET ORAL DAILY
Status: DISCONTINUED | OUTPATIENT
Start: 2024-11-03 | End: 2024-11-06 | Stop reason: HOSPADM

## 2024-11-02 RX ORDER — DIPHENHYDRAMINE HCL 25 MG
25 CAPSULE ORAL EVERY 4 HOURS PRN
Status: DISCONTINUED | OUTPATIENT
Start: 2024-11-02 | End: 2024-11-06 | Stop reason: HOSPADM

## 2024-11-02 RX ORDER — DIPHENHYDRAMINE HYDROCHLORIDE 50 MG/ML
25 INJECTION INTRAMUSCULAR; INTRAVENOUS EVERY 4 HOURS PRN
Status: DISCONTINUED | OUTPATIENT
Start: 2024-11-02 | End: 2024-11-06 | Stop reason: HOSPADM

## 2024-11-02 RX ORDER — FOLIC ACID 0.8 MG
800 TABLET ORAL DAILY
Status: DISCONTINUED | OUTPATIENT
Start: 2024-11-03 | End: 2024-11-02

## 2024-11-02 RX ORDER — INDOMETHACIN 25 MG/1
50 CAPSULE ORAL ONCE
Status: COMPLETED | OUTPATIENT
Start: 2024-11-02 | End: 2024-11-02

## 2024-11-02 RX ORDER — METOCLOPRAMIDE HYDROCHLORIDE 5 MG/ML
10 INJECTION INTRAMUSCULAR; INTRAVENOUS ONCE
Status: COMPLETED | OUTPATIENT
Start: 2024-11-02 | End: 2024-11-02

## 2024-11-02 RX ORDER — FAMOTIDINE 10 MG/ML
20 INJECTION INTRAVENOUS ONCE
Status: COMPLETED | OUTPATIENT
Start: 2024-11-02 | End: 2024-11-02

## 2024-11-02 RX ORDER — ONDANSETRON 8 MG/1
8 TABLET, ORALLY DISINTEGRATING ORAL ONCE
Status: COMPLETED | OUTPATIENT
Start: 2024-11-02 | End: 2024-11-02

## 2024-11-02 RX ORDER — SODIUM CHLORIDE, SODIUM LACTATE, POTASSIUM CHLORIDE, CALCIUM CHLORIDE 600; 310; 30; 20 MG/100ML; MG/100ML; MG/100ML; MG/100ML
INJECTION, SOLUTION INTRAVENOUS CONTINUOUS
Status: DISCONTINUED | OUTPATIENT
Start: 2024-11-02 | End: 2024-11-03

## 2024-11-02 RX ORDER — ONDANSETRON 8 MG/1
8 TABLET, ORALLY DISINTEGRATING ORAL EVERY 8 HOURS PRN
Status: DISCONTINUED | OUTPATIENT
Start: 2024-11-02 | End: 2024-11-06 | Stop reason: HOSPADM

## 2024-11-02 RX ORDER — SIMETHICONE 80 MG
1 TABLET,CHEWABLE ORAL EVERY 6 HOURS PRN
Status: DISCONTINUED | OUTPATIENT
Start: 2024-11-02 | End: 2024-11-06 | Stop reason: HOSPADM

## 2024-11-02 RX ORDER — FAMOTIDINE 20 MG/1
40 TABLET, FILM COATED ORAL DAILY
Status: DISCONTINUED | OUTPATIENT
Start: 2024-11-03 | End: 2024-11-04

## 2024-11-02 RX ORDER — CALCIUM CARBONATE 200(500)MG
500 TABLET,CHEWABLE ORAL 3 TIMES DAILY PRN
Status: DISCONTINUED | OUTPATIENT
Start: 2024-11-02 | End: 2024-11-06 | Stop reason: HOSPADM

## 2024-11-02 RX ORDER — MAGNESIUM SULFATE HEPTAHYDRATE 40 MG/ML
2 INJECTION, SOLUTION INTRAVENOUS CONTINUOUS
Status: DISCONTINUED | OUTPATIENT
Start: 2024-11-02 | End: 2024-11-03

## 2024-11-02 RX ORDER — MAGNESIUM SULFATE HEPTAHYDRATE 40 MG/ML
6 INJECTION, SOLUTION INTRAVENOUS ONCE
Status: COMPLETED | OUTPATIENT
Start: 2024-11-02 | End: 2024-11-02

## 2024-11-02 RX ORDER — ACETAMINOPHEN 325 MG/1
650 TABLET ORAL EVERY 6 HOURS PRN
Status: DISCONTINUED | OUTPATIENT
Start: 2024-11-02 | End: 2024-11-06 | Stop reason: HOSPADM

## 2024-11-02 RX ORDER — CALCIUM GLUCONATE 98 MG/ML
1 INJECTION, SOLUTION INTRAVENOUS
Status: DISCONTINUED | OUTPATIENT
Start: 2024-11-02 | End: 2024-11-03

## 2024-11-02 RX ORDER — AMOXICILLIN 250 MG
1 CAPSULE ORAL NIGHTLY PRN
Status: DISCONTINUED | OUTPATIENT
Start: 2024-11-02 | End: 2024-11-06 | Stop reason: HOSPADM

## 2024-11-02 RX ORDER — PRENATAL WITH FERROUS FUM AND FOLIC ACID 3080; 920; 120; 400; 22; 1.84; 3; 20; 10; 1; 12; 200; 27; 25; 2 [IU]/1; [IU]/1; MG/1; [IU]/1; MG/1; MG/1; MG/1; MG/1; MG/1; MG/1; UG/1; MG/1; MG/1; MG/1; MG/1
1 TABLET ORAL DAILY
Status: DISCONTINUED | OUTPATIENT
Start: 2024-11-03 | End: 2024-11-06 | Stop reason: HOSPADM

## 2024-11-02 RX ORDER — BETAMETHASONE SODIUM PHOSPHATE AND BETAMETHASONE ACETATE 3; 3 MG/ML; MG/ML
12 INJECTION, SUSPENSION INTRA-ARTICULAR; INTRALESIONAL; INTRAMUSCULAR; SOFT TISSUE ONCE
Status: COMPLETED | OUTPATIENT
Start: 2024-11-02 | End: 2024-11-02

## 2024-11-02 RX ORDER — SODIUM CHLORIDE 0.9 % (FLUSH) 0.9 %
10 SYRINGE (ML) INJECTION
Status: DISCONTINUED | OUTPATIENT
Start: 2024-11-02 | End: 2024-11-06 | Stop reason: HOSPADM

## 2024-11-02 RX ORDER — FOLIC ACID 1 MG/1
1000 TABLET ORAL DAILY
Status: DISCONTINUED | OUTPATIENT
Start: 2024-11-03 | End: 2024-11-06 | Stop reason: HOSPADM

## 2024-11-02 RX ADMIN — SODIUM CHLORIDE, POTASSIUM CHLORIDE, SODIUM LACTATE AND CALCIUM CHLORIDE: 600; 310; 30; 20 INJECTION, SOLUTION INTRAVENOUS at 09:11

## 2024-11-02 RX ADMIN — BETAMETHASONE SODIUM PHOSPHATE AND BETAMETHASONE ACETATE 12 MG: 3; 3 INJECTION, SUSPENSION INTRA-ARTICULAR; INTRALESIONAL; INTRAMUSCULAR at 06:11

## 2024-11-02 RX ADMIN — MAGNESIUM SULFATE IN WATER 2 G/HR: 40 INJECTION, SOLUTION INTRAVENOUS at 07:11

## 2024-11-02 RX ADMIN — INDOMETHACIN 50 MG: 25 CAPSULE ORAL at 06:11

## 2024-11-02 RX ADMIN — MAGNESIUM SULFATE HEPTAHYDRATE 6 G: 40 INJECTION, SOLUTION INTRAVENOUS at 06:11

## 2024-11-02 RX ADMIN — FAMOTIDINE 20 MG: 10 INJECTION, SOLUTION INTRAVENOUS at 09:11

## 2024-11-02 RX ADMIN — ONDANSETRON 8 MG: 8 TABLET, ORALLY DISINTEGRATING ORAL at 05:11

## 2024-11-02 RX ADMIN — METOCLOPRAMIDE 10 MG: 5 INJECTION, SOLUTION INTRAMUSCULAR; INTRAVENOUS at 07:11

## 2024-11-02 NOTE — ED PROVIDER NOTES
Encounter Date: 2024       History     Chief Complaint   Patient presents with    Nausea    Vomiting     Eve Salazar is a 29 y.o. [id number] at 30w0d presents complaining of vomiting.   This IUP is complicated by quad pregnancy, cHTN, HSV, GBS pos.  Patient denies contractions, denies vaginal bleeding, denies LOF.   Fetal Movement: normal    Cc: Patient has been having nausea/vomiting since this morning. She reports 7-8 episodes of emesis today. She has had two episodes of diarrhea today.   She denies fevers, chills, dysuria.         Review of patient's allergies indicates:  No Known Allergies  Past Medical History:   Diagnosis Date    HSV (herpes simplex virus) anogenital infection      Past Surgical History:   Procedure Laterality Date     SECTION N/A 2022    Procedure:  SECTION;  Surgeon: Laura Vasques MD;  Location: [facility] L&D;  Service: OB/GYN;  Laterality: N/A;     Family History   Problem Relation Name Age of Onset    Breast cancer Neg Hx      Cancer Neg Hx      Colon cancer Neg Hx      Diabetes Neg Hx      Eclampsia Neg Hx      Hypertension Neg Hx      Miscarriages / Stillbirths Neg Hx      Ovarian cancer Neg Hx       labor Neg Hx      Stroke Neg Hx       Social History     Tobacco Use    Smoking status: Former    Smokeless tobacco: Never   Substance Use Topics    Alcohol use: Not Currently     Alcohol/week: 2.0 standard drinks of alcohol     Types: 2 Glasses of wine per week    Drug use: No     Review of Systems   Constitutional:  Negative for chills and fatigue.   HENT:  Negative for congestion and sore throat.    Eyes:  Negative for visual disturbance.   Respiratory:  Negative for chest tightness and shortness of breath.    Cardiovascular:  Negative for chest pain and leg swelling.   Gastrointestinal:  Positive for diarrhea, nausea and vomiting. Negative for abdominal pain.   Genitourinary:  Negative for dysuria, vaginal bleeding and vaginal discharge.    Neurological:  Negative for headaches.       Physical Exam     Initial Vitals   BP Pulse Resp Temp SpO2   -- -- -- -- --      MAP       --         Physical Exam    Constitutional: She appears well-developed and well-nourished. No distress.   HENT:   Head: Normocephalic and atraumatic.   Neck: Neck supple.   Normal range of motion.  Pulmonary/Chest: No respiratory distress.   Abdominal: There is no abdominal tenderness.   Gravid abdomen   Musculoskeletal:      Cervical back: Normal range of motion and neck supple.     Neurological: She is alert and oriented to person, place, and time.   Skin: Skin is warm and dry.   Psychiatric: She has a normal mood and affect.     OB LABOR EXAM:             Dilatation: 4.   Station: -3.   Effacement: 60%.             ED Course   Obtain Fetal nonstress test (NST)    Date/Time: 11/2/2024 6:06 PM    Performed by: Darlyn Acuna MD  Authorized by: Darlyn Acuna MD    Nonstress Test:     Variability:  6-25 BPM    Decelerations:  None    Accelerations:  15 bpm    Contractions:  Regular    Contraction Frequency:  Q3min  Biophysical Profile:     Nonstress Test Interpretation: reactive      Overall Impression:  Reassuring  Post-procedure:      Baby 1: 120  Baby 2: 150  Baby 3: 145  Baby 4: 125    Labs Reviewed   INFLUENZA A & B BY MOLECULAR   CBC W/ AUTO DIFFERENTIAL   COMPREHENSIVE METABOLIC PANEL   MAGNESIUM   PHOSPHORUS   SARS-COV-2 RNA AMPLIFICATION, QUAL   POCT URINALYSIS, DIPSTICK OR TABLET REAGENT, AUTOMATED, WITH MICROSCOP   TYPE & SCREEN          Imaging Results    None          Medications   lactated ringers bolus 500 mL (has no administration in time range)   magnesium sulfate in water 40 gram/1,000 mL (4 %) bolus from bag 6 g 150 mL (has no administration in time range)     And   lactated ringers infusion (has no administration in time range)   magnesium sulfate in water 40 gram/1,000 mL (4 %) infusion (has no administration in time range)   calcium gluconate 100 mg/mL (10%)  injection 1 g (has no administration in time range)   aspirin EC tablet 81 mg (has no administration in time range)   famotidine tablet 40 mg (has no administration in time range)   prenatal vitamin oral tablet (has no administration in time range)   sodium chloride 0.9% flush 10 mL (has no administration in time range)   acetaminophen tablet 650 mg (has no administration in time range)   ondansetron disintegrating tablet 8 mg (has no administration in time range)   senna-docusate 8.6-50 mg per tablet 1 tablet (has no administration in time range)   simethicone chewable tablet 80 mg (has no administration in time range)   diphenhydrAMINE capsule 25 mg (has no administration in time range)   diphenhydrAMINE injection 25 mg (has no administration in time range)   folic acid tablet 1,000 mcg (has no administration in time range)   ondansetron disintegrating tablet 8 mg (8 mg Oral Given 24 1717)   betamethasone acetate-betamethasone sodium phosphate injection 12 mg (12 mg Intramuscular Given 24 1800)   indomethacin capsule 50 mg (50 mg Oral Given 24 1803)     Medical Decision Making  Eve Salazar is a 29 y.o. [id number] at 30w0d presents complaining of vomiting.        NST reactive and reassuring  Jenner: q 3min      Vomiting:   -CBC, CMP, Mg, Phos pending  -Covid, flu pending     Pre-term Labor:   SVE: /-3 @ 1730  -LR infusion   -Mag, BMZ, Indocin   -Admit to L&D       Patient will be admitted to L&D for pre-term labor. Plan to start Magnesium sulfate, BMZ, and Indocin. LR infusion started in [facility].         Darlyn Acuna MD  Obstetrics & Gynecology, PGY-1      Amount and/or Complexity of Data Reviewed  Labs: ordered.    Risk  Prescription drug management.  Decision regarding hospitalization.              Attending Attestation:   Physician Attestation Statement for Resident:  As the supervising MD   Physician Attestation Statement: I have personally seen and examined this patient.   I agree with the  above history.  -:   As the supervising MD I agree with the above PE.     As the supervising MD I agree with the above treatment, course, plan, and disposition.   I was personally present during the critical portions of the procedure(s) performed by the resident and was immediately available in the ED to provide services and assistance as needed during the entire procedure.  I have reviewed and agree with the residents interpretation of the following: lab data.  I have reviewed the following: old records at this facility.                      I agree with the above edited resident note. Pt seen and examined, chart and labs reviewed.    Briefly, 28 YO  at 30w0d with Quad-Quad quadruplets presenting with n/v and abdominal pain x 1 day. Noted to be amaya regularly on toco, SVE /-3, previously closed on 10/30.     Discussed with MFM on call. Will admit to L&D for MgSO4, IVF, BMZ course. Will give dose of indocin 50mg now for tocolysis.     FHT reassuring x 4, [place] Q2 minutes. Consents in chart. See full H&P to follow.     Gracia Bazan MD   OBN Hospitalist                   Clinical Impression:  Final diagnoses:  [O60.14X2]  labor in third trimester with  delivery, fetus 2 of multiple gestation (Primary)  [Z3A.30] 30 weeks gestation of pregnancy  [R11.2] Nausea and vomiting, unspecified vomiting type  [O30.233] Quadrachorionic quadra-amniotic quadruplet pregnancy in third trimester          ED Disposition Condition    Send to L&D                 Darlyn Acuna MD  Resident  [phone]       Gracia Bazan MD  [phone]

## 2024-11-02 NOTE — H&P
HISTORY AND PHYSICAL                                                OBSTETRICS          Subjective:       Eve Salazar is a 29 y.o.  female with IUP at 30w0d weeks gestation who presents with pre-term labor. Patient seen in the [facility] for nausea/vomiting. On [place] she was amaya q3 minutes. SVE 4/60/-3. Patient admitted to L&D for pre-term labor. Plan for LR infusion, Mag, Indocin, and BMZ.     Patient reports contractions, denies vaginal bleeding, denies LOF.   Fetal Movement: normal.    This IUP is complicated by quad pregnancy (4 amnion, 4 chorion), cHTN (no meds).     Review of Systems   Constitutional:  Negative for chills and fever.   Eyes:  Negative for visual disturbance.   Respiratory:  Negative for shortness of breath.    Cardiovascular:  Negative for chest pain.   Gastrointestinal:  Positive for diarrhea, nausea and vomiting.   Genitourinary:  Positive for pelvic pain. Negative for dysuria.   Neurological:  Negative for headaches.       PMHx:   Past Medical History:   Diagnosis Date    HSV (herpes simplex virus) anogenital infection        PSHx:   Past Surgical History:   Procedure Laterality Date     SECTION N/A 2022    Procedure:  SECTION;  Surgeon: Laura Vasques MD;  Location: [facility] L&D;  Service: OB/GYN;  Laterality: N/A;       All: Review of patient's allergies indicates:  No Known Allergies    Meds: (Not in a hospital admission)      SH:   Social History     Socioeconomic History    Marital status:    Tobacco Use    Smoking status: Former    Smokeless tobacco: Never   Substance and Sexual Activity    Alcohol use: Not Currently     Alcohol/week: 2.0 standard drinks of alcohol     Types: 2 Glasses of wine per week    Drug use: No    Sexual activity: Yes     Partners: Male     Birth control/protection: None       FH:   Family History   Problem Relation Name Age of Onset    Breast cancer Neg Hx      Cancer Neg Hx      Colon  cancer Neg Hx      Diabetes Neg Hx      Eclampsia Neg Hx      Hypertension Neg Hx      Miscarriages / Stillbirths Neg Hx      Ovarian cancer Neg Hx       labor Neg Hx      Stroke Neg Hx         OBHx:   OB History    Para Term  AB Living   2 1 1 0 0 1   SAB IAB Ectopic Multiple Live Births   0 0 0 0 1      # Outcome Date GA Lbr Klever/2nd Weight Sex Type Anes PTL Lv   2 Current            1 Term 22 38w2d  3.91 kg (8 lb 9.9 oz) M CS-LTranv Spinal, EPI N BUTCH      Complications: Failure to Progress in First Stage      Name: ELLIOT BERGER      Apgar1: 9  Apgar5: 9       Objective:       LMP 2023 (Exact Date)     There were no vitals filed for this visit.    General:   alert, appears stated age and cooperative, no apparent distress   HENT:  normocephalic, atraumatic   Eyes:  extraocular movements and conjunctivae normal   Neck:  supple, range of motion normal, no thyromegaly   Lungs:   no respiratory distress   Heart:   regular rate   Abdomen:  soft, non-tender, non-distended but gravid, no rebound or guarding    Extremities negative edema, negative erythema   FHT: , Baby A, Baby B, Baby C, Baby D , moderate variability, +accels, + variable decels;  Cat 2 (reassuring)                 TOCO: Q 3 minutes   Presentations: Baby A cephalic, Baby B cephalic, Baby C cephalic, Baby D breech by ultrasound   Cervix:     Dilation: 4    Effacement: 60    Station:  -3             Recent Growth Scan: @ 28w4d  Baby A: 1091g (26%), AC 2%  Baby B: 1237g (42%), AC 41%  Baby C: 1253g (43%), AC 33%  Baby D: 1195 (37%), AC 32%    Lab Review  Blood Type O POS  GBBS: positive  Rubella: Immune  RPR: NR  HIV: negative  HepB: negative       Assessment:       30w0d weeks gestation with PTL.     Active Hospital Problems    Diagnosis  POA    * labor [O60.00]  Unknown      Resolved Hospital Problems   No resolved problems to display.          Plan:   PTL:    Risks, benefits, alternatives and possible  complications have been discussed in detail with the patient.   - Consents signed and to chart  - Admit to Labor and Delivery unit  - Continue Mag  - S/p Indomethacin 50mg   - BMZ (1/2) 11/2    - Epidural per Anesthesia  - Draw CBC, T&S  - Notify Staff  - Recheck in 2 hrs or PRN  - EFW: see above       Post-Partum Hemorrhage risk - low    cHTN  -on no meds   -CBC and CMP pending     Darlyn Acuna MD  Obstetrics & Gynecology, PGY-1

## 2024-11-03 PROCEDURE — 63600175 PHARM REV CODE 636 W HCPCS

## 2024-11-03 PROCEDURE — 86920 COMPATIBILITY TEST SPIN: CPT

## 2024-11-03 PROCEDURE — 25000003 PHARM REV CODE 250: Performed by: STUDENT IN AN ORGANIZED HEALTH CARE EDUCATION/TRAINING PROGRAM

## 2024-11-03 PROCEDURE — 59025 FETAL NON-STRESS TEST: CPT | Mod: 26,,, | Performed by: OBSTETRICS & GYNECOLOGY

## 2024-11-03 PROCEDURE — 25000003 PHARM REV CODE 250

## 2024-11-03 PROCEDURE — 11000001 HC ACUTE MED/SURG PRIVATE ROOM

## 2024-11-03 RX ORDER — BETAMETHASONE SODIUM PHOSPHATE AND BETAMETHASONE ACETATE 3; 3 MG/ML; MG/ML
12 INJECTION, SUSPENSION INTRA-ARTICULAR; INTRALESIONAL; INTRAMUSCULAR; SOFT TISSUE ONCE
Status: COMPLETED | OUTPATIENT
Start: 2024-11-03 | End: 2024-11-03

## 2024-11-03 RX ORDER — INDOMETHACIN 25 MG/1
25 CAPSULE ORAL
Status: COMPLETED | OUTPATIENT
Start: 2024-11-03 | End: 2024-11-04

## 2024-11-03 RX ORDER — LIDOCAINE HYDROCHLORIDE 20 MG/ML
15 SOLUTION OROPHARYNGEAL ONCE AS NEEDED
Status: COMPLETED | OUTPATIENT
Start: 2024-11-03 | End: 2024-11-04

## 2024-11-03 RX ORDER — ALUMINUM HYDROXIDE, MAGNESIUM HYDROXIDE, AND SIMETHICONE 1200; 120; 1200 MG/30ML; MG/30ML; MG/30ML
30 SUSPENSION ORAL EVERY 6 HOURS PRN
Status: DISCONTINUED | OUTPATIENT
Start: 2024-11-03 | End: 2024-11-06 | Stop reason: HOSPADM

## 2024-11-03 RX ADMIN — INDOMETHACIN 25 MG: 25 CAPSULE ORAL at 01:11

## 2024-11-03 RX ADMIN — CALCIUM CARBONATE (ANTACID) CHEW TAB 500 MG 500 MG: 500 CHEW TAB at 07:11

## 2024-11-03 RX ADMIN — CALCIUM CARBONATE (ANTACID) CHEW TAB 500 MG 500 MG: 500 CHEW TAB at 12:11

## 2024-11-03 RX ADMIN — BETAMETHASONE SODIUM PHOSPHATE AND BETAMETHASONE ACETATE 12 MG: 3; 3 INJECTION, SUSPENSION INTRA-ARTICULAR; INTRALESIONAL; INTRAMUSCULAR at 05:11

## 2024-11-03 RX ADMIN — INDOMETHACIN 25 MG: 25 CAPSULE ORAL at 12:11

## 2024-11-03 RX ADMIN — FOLIC ACID 1000 MCG: 1 TABLET ORAL at 08:11

## 2024-11-03 RX ADMIN — ONDANSETRON 8 MG: 8 TABLET, ORALLY DISINTEGRATING ORAL at 05:11

## 2024-11-03 RX ADMIN — INDOMETHACIN 25 MG: 25 CAPSULE ORAL at 07:11

## 2024-11-03 RX ADMIN — ASPIRIN 81 MG: 81 TABLET, COATED ORAL at 08:11

## 2024-11-03 RX ADMIN — FAMOTIDINE 40 MG: 20 TABLET ORAL at 08:11

## 2024-11-03 NOTE — PLAN OF CARE
Problem:  Labor  Goal: Delayed  Delivery  Outcome: Progressing     Problem: Hypertensive Disorders in Pregnancy  Goal: Patient-Fetal Stabilization  Outcome: Progressing   Stable. Vss. Fetal monitoring continued (difficulty noted in monitoring baby C; MD made aware. Active fetal movement noted with audbile VXH=[id number] not registering on monitor). Po indocin continued; pt denies feeling ctx with occasional ctx noted on toco.  Mag infusion discontinued this am. Repeat cervical check x1=4/60/-3. Encouraged pt to call nurse if contractions increase in frequency/intensity, pressure in bottom, or lof. Pt verbalized understanding. Family at bedside for support.

## 2024-11-03 NOTE — CARE UPDATE
AM NST    Twin A  FHT: 125 bpm, moderate BTBV, + accels, - decels; (reassuring)    Twin B  FHT: 130 bpm, moderate BTBV, + accels, - decels; (reassuring)    Twin C  FHT: 130 bpm, moderate BTBV, + accels, - decels; (reassuring)    Twin D  FHT: 130 bpm, moderate BTBV, +accels, -decels; (reassuring    [place]: no contractions    Ultrasound confirmation for placement of monitors    Sofia Arguello MD  Obstetrics and Gynecology, PGY-2

## 2024-11-03 NOTE — CARE UPDATE
LATE ENTRY       MD to bedside at 2200. Reports contractions now subsided after indocin and IVF. Some back pain, overall feeling better.     FHT cat 1 x 4  [place]: no contractions  SVE: 4/60/-3, unchanged from previous. Amniotic sac less taut than on prior check.       Plan:   - Continue indocin course through BMZ window, first dose given at 1745  - DC MgSO4 at this time, rebolus 6g load if contractions return   - continuous monitoring  - may have clear liquid diet   - Recheck in AM or PRN     Gracia Bazan MD   [facility] Hospitalist

## 2024-11-03 NOTE — PROGRESS NOTES
"Maternal Fetal Medicine  Progress Note          Subjective:    Eve Salazar is a 29 y.o.  at 30w1d admitted for PTL. Please see H&P for details regarding presentation at admission. This pregnancy is complicated by quad-quad pregnancy and cHTN.    Interim HPI: Reports contractions have spaced out overnight. Denies any contractions currently. Denies LOF or VB. Reports good fetal movement. Denies any other complaints.     Medical, Surgical, Social, Family, and Obstetric History: reviewed in chart  Current Medications:    aspirin  81 mg Oral Daily    betamethasone acetate-betamethasone sodium phosphate  12 mg Intramuscular Once    famotidine  40 mg Oral Daily    folic acid  1,000 mcg Oral Daily    indomethacin  25 mg Oral Q6H    lactated ringers  500 mL Intravenous Once    prenatal vitamin  1 tablet Oral Daily      Current Facility-Administered Medications:     acetaminophen, 650 mg, Oral, Q6H PRN    calcium carbonate, 500 mg, Oral, TID PRN    diphenhydrAMINE, 25 mg, Oral, Q4H PRN    diphenhydrAMINE, 25 mg, Intravenous, Q4H PRN    ondansetron, 8 mg, Oral, Q8H PRN    senna-docusate 8.6-50 mg, 1 tablet, Oral, Nightly PRN    simethicone, 1 tablet, Oral, Q6H PRN    sodium chloride 0.9%, 10 mL, Intravenous, PRN   Objective:   BP (!) 117/56   Pulse 93   Temp 98 °F (36.7 °C) (Oral)   Resp 20   Ht 5' 6" (1.676 m)   Wt 105.4 kg (232 lb 5.8 oz)   LMP 2023 (Exact Date)   SpO2 99%   Breastfeeding No   BMI 37.50 kg/m²     Focused Physical Examination   General: well developed, no acute distress  Pulmonary: respiratory effort normal with no retractions  Abdomen: gravid  Cervix: deferred    Fetal Monitoring  EFM:   Baby A 120 baseline/ moderate variability/+ accels/- decels  Baby B 110 baseline/moderate variability/+ accels/- decels  Baby C: Difficult to trace however audibly in 130s  Baby D: 140 baseline/moderate variability/+ accels/- decels  Tocometer:     Lab Results  Recent Labs   Lab 10/30/24  1148 " 24  1827   WBC 9.54 11.60   HGB 9.2* 10.4*   HCT 28.5* 32.1*    220   BUN  --  9   CREATININE  --  0.6   ALT  --  25   AST  --  37   MG  --  1.8   PHOS  --  3.0        Assessment:   29 y.o.  at 30w1d admitted for PTL in the setting of quad-quad pregnancy.    Plan:   PTL   - Patient reports no contractions on rounds. Reports contractions significantly spaced overnight.   - Magnesium de-escalated   - Will transition from continuous to NST BID   - Continue BMZ (11/3-)   - Continue indocin tocolysis through steroid window     2. cHTN  - no home meds   - pre-e labs stable.     3. Quad- Quad pregnancy   -Baby A: EFW 1091g 26% AC 2% @ 28w4d   -Baby B: EFW 1237g 42% AC 41% @ 28w4d   -Baby C: EFW 1253g 43% AC 33% @ 28w4d   -Baby D: EFW 1195g 37% AC 32% @ 28w4d     Kaylene Rutherford MD PGY-3  Obstetrics and Gynecology  [facility]

## 2024-11-04 PROCEDURE — 99236 HOSP IP/OBS SAME DATE HI 85: CPT | Mod: 25,,, | Performed by: OBSTETRICS & GYNECOLOGY

## 2024-11-04 PROCEDURE — 59025 FETAL NON-STRESS TEST: CPT | Mod: 26,,, | Performed by: OBSTETRICS & GYNECOLOGY

## 2024-11-04 PROCEDURE — 25000003 PHARM REV CODE 250

## 2024-11-04 PROCEDURE — 11000001 HC ACUTE MED/SURG PRIVATE ROOM

## 2024-11-04 RX ORDER — FAMOTIDINE 20 MG/1
40 TABLET, FILM COATED ORAL 2 TIMES DAILY
Status: DISCONTINUED | OUTPATIENT
Start: 2024-11-04 | End: 2024-11-06 | Stop reason: HOSPADM

## 2024-11-04 RX ADMIN — INDOMETHACIN 25 MG: 25 CAPSULE ORAL at 02:11

## 2024-11-04 RX ADMIN — INDOMETHACIN 25 MG: 25 CAPSULE ORAL at 08:11

## 2024-11-04 RX ADMIN — ALUMINUM HYDROXIDE, MAGNESIUM HYDROXIDE, AND SIMETHICONE 30 ML: 200; 200; 20 SUSPENSION ORAL at 04:11

## 2024-11-04 RX ADMIN — ASPIRIN 81 MG: 81 TABLET, COATED ORAL at 09:11

## 2024-11-04 RX ADMIN — LIDOCAINE HYDROCHLORIDE 15 ML: 20 SOLUTION ORAL; TOPICAL at 04:11

## 2024-11-04 RX ADMIN — FAMOTIDINE 40 MG: 20 TABLET ORAL at 08:11

## 2024-11-04 RX ADMIN — INDOMETHACIN 25 MG: 25 CAPSULE ORAL at 01:11

## 2024-11-04 RX ADMIN — FOLIC ACID 1000 MCG: 1 TABLET ORAL at 09:11

## 2024-11-04 RX ADMIN — PRENATAL VIT W/ FE FUMARATE-FA TAB 27-0.8 MG 1 TABLET: 27-0.8 TAB at 06:11

## 2024-11-04 NOTE — PROGRESS NOTES
"Maternal Fetal Medicine  Progress Note          Subjective:    Eve Salazar is a 29 y.o.  admitted on 24 at 30w0d for PTL. Please see H&P for details regarding presentation at admission. This pregnancy is complicated by quad-quad pregnancy and cHTN.    Interim HPI:   Today, patient is 30w2d. Doing well, resting comfortably in bed. Denies contractions overnight. Denies LOF or VB. Reports good fetal movement. Reporting persistent GERD, took GI cocktail this AM with some relief.     Medical, Surgical, Social, Family, and Obstetric History: reviewed in chart  Current Medications:    aspirin  81 mg Oral Daily    famotidine  40 mg Oral Daily    folic acid  1,000 mcg Oral Daily    indomethacin  25 mg Oral Q6H    prenatal vitamin  1 tablet Oral Daily      Current Facility-Administered Medications:     acetaminophen, 650 mg, Oral, Q6H PRN    calcium carbonate, 500 mg, Oral, TID PRN    diphenhydrAMINE, 25 mg, Oral, Q4H PRN    diphenhydrAMINE, 25 mg, Intravenous, Q4H PRN    ondansetron, 8 mg, Oral, Q8H PRN    senna-docusate 8.6-50 mg, 1 tablet, Oral, Nightly PRN    simethicone, 1 tablet, Oral, Q6H PRN    sodium chloride 0.9%, 10 mL, Intravenous, PRN   Objective:   /66   Pulse 101   Temp 97.9 °F (36.6 °C)   Resp 18   Ht 5' 6" (1.676 m)   Wt 105.4 kg (232 lb 5.8 oz)   LMP 2023 (Exact Date)   SpO2 100%   Breastfeeding No   BMI 37.50 kg/m²     Focused Physical Examination   General: well developed, no acute distress  Pulmonary: respiratory effort normal with no retractions  Abdomen: gravid, non-TTP  Cervix: deferred    Fetal Monitoring  EFM:   Fetus A: 130 bpm, mod variability, + accels, - decels; overall reassuring and appropriate for gestationl age   Fetus B: 135 bpm, mod variability, + accels, - decels; overall reassuring and appropriate for gestational age   Fetus C: 130 bpm, mod variability, + accels, - decels; overall reassuring and appropriate for gestational age   Fetus D: 135 bpm, mod " variability, + accels, - decels; overall reassuring and appropriate for gestational age   No contractions noted     Lab Results  Recent Labs   Lab 10/30/24  1148 24  1827   WBC 9.54 11.60   HGB 9.2* 10.4*   HCT 28.5* 32.1*    220   BUN  --  9   CREATININE  --  0.6   ALT  --  25   AST  --  37   MG  --  1.8   PHOS  --  3.0        Assessment:   29 y.o.  at 30w1d admitted for PTL in the setting of quad-quad pregnancy.    Plan:   PTL   - currently asymptomatic  - continue BMZ (11/3-)   - continue indocin tocolysis through steroid window   - NST BID  - regular diet  - Neonatology notified  - plan for MgSO4 and CS if patient dilates further    2. cHTN  - no home meds   - pre-e labs stable    3. Quad- Quad pregnancy   - Baby A: EFW 1091g 26% AC 2% @ 28w4d   - Baby B: EFW 1237g 42% AC 41% @ 28w4d   - Baby C: EFW 1253g 43% AC 33% @ 28w4d   - Baby D: EFW 1195g 37% AC 32% @ 28w4d     4. GERD  - s/p GI cocktail  - Pepcid BID, Tums at bedside    Cira Campbell MD   OB/GYN PGY-3

## 2024-11-04 NOTE — CARE UPDATE
Fetus A: 130 bpm, mod variability, + accels, - decels; overall reassuring and appropriate for gestationl age     Fetus B: 135 bpm, mod variability, + accels, - decels; overall reassuring and appropriate for gestational age     Fetus C: 130 bpm, mod variability, + accels, - decels; overall reassuring and appropriate for gestational age     Fetus D: 135 bpm, mod variability, + accels, - decels; overall reassuring and appropriate for gestational age     No contractions noted     Shannon Hannon MD/MPH  OB/GYN PGY-4  [facility]

## 2024-11-04 NOTE — PLAN OF CARE
VSS. Pt denies pain or contractions overnight. Pt reports indigestion. New PRN meds ordered. Pt reports some relief. Babies monitored per orders, all babies reactive and reassuring during monitoring. Pt reports normal, active fetal movement overnight. Pt rested well overnight; no injuries or falls occurred. All questions answered.      Problem:  Fall Injury Risk  Goal: Absence of Fall, Infant Drop and Related Injury  Outcome: Progressing     Problem: Adult Inpatient Plan of Care  Goal: Plan of Care Review  Outcome: Progressing  Goal: Patient-Specific Goal (Individualized)  Outcome: Progressing  Goal: Absence of Hospital-Acquired Illness or Injury  Outcome: Progressing  Goal: Optimal Comfort and Wellbeing  Outcome: Progressing  Goal: Readiness for Transition of Care  Outcome: Progressing     Problem:  Labor  Goal: Delayed  Delivery  Outcome: Progressing     Problem: Hypertensive Disorders in Pregnancy  Goal: Patient-Fetal Stabilization  Outcome: Progressing

## 2024-11-04 NOTE — PLAN OF CARE
Problem:  Labor  Goal: Delayed  Delivery  Outcome: Progressing     Problem: Hypertensive Disorders in Pregnancy  Goal: Patient-Fetal Stabilization  Outcome: Progressing     Problem: Adult Inpatient Plan of Care  Goal: Optimal Comfort and Wellbeing  Outcome: Progressing

## 2024-11-04 NOTE — CARE UPDATE
AM NST    A 120, mod [place], + accels, - decels, reassuring and appropriate for gestational age   B 130, mod [place], + accels, - decels, reassuring and appropriate for gestational age  C 125, mod [place], + accels. - decels>>limited tracing   D 130  mod [place], + accels. - decels>>limited tracing     Tracing limited after ~ 1 hour and 15 minutes due to patient needing to void and symptoms of GERD. Pt up to use the restroom.    Pt placed back on monitor  ~ 5 minutes of tracing of C and D reactive and reassuring    C and D visualized practice breathing for 30 seconds each on BSUS    Plan for PM NST     Tita Johnson MD, MPH  OBGYN PGY-4

## 2024-11-05 PROCEDURE — 59025 FETAL NON-STRESS TEST: CPT | Mod: 26,,, | Performed by: OBSTETRICS & GYNECOLOGY

## 2024-11-05 PROCEDURE — 99236 HOSP IP/OBS SAME DATE HI 85: CPT | Mod: 25,,, | Performed by: OBSTETRICS & GYNECOLOGY

## 2024-11-05 PROCEDURE — 11000001 HC ACUTE MED/SURG PRIVATE ROOM

## 2024-11-05 PROCEDURE — 25000003 PHARM REV CODE 250

## 2024-11-05 RX ADMIN — ASPIRIN 81 MG: 81 TABLET, COATED ORAL at 09:11

## 2024-11-05 RX ADMIN — FOLIC ACID 1000 MCG: 1 TABLET ORAL at 09:11

## 2024-11-05 RX ADMIN — FAMOTIDINE 40 MG: 20 TABLET ORAL at 09:11

## 2024-11-05 NOTE — CARE UPDATE
PM NST [DELAYED ENTRY]    A 140, mod [place], + accels, - decels, reassuring and appropriate for gestational age   B 130, mod [place], + accels, - decels, reassuring and appropriate for gestational age  C 155, mod [place], + accels. - decels, reassuring and appropriate for gestational age  D 130  mod [place], + accels. - decels; difficult to discern, but from [id number] appears to have 2 accelerations and no decelerations      Plan for AM NST     Andrzej Nieves MD MS  OB/Gyn  PGY-2

## 2024-11-05 NOTE — PROGRESS NOTES
"Maternal Fetal Medicine  Progress Note          Subjective:    Eve Salazar is a 29 y.o.  admitted on 24 at 30w0d for PTL. Please see H&P for details regarding presentation at admission. This pregnancy is complicated by quad-quad pregnancy and cHTN.    Interim HPI:   Today, patient is 30w3d. Doing well, resting comfortably in bed. Denies contractions overnight. Denies LOF or VB. Reports good fetal movement x4. Reports GERD symptoms have improved.    Medical, Surgical, Social, Family, and Obstetric History: reviewed in chart  Current Medications:    aspirin  81 mg Oral Daily    famotidine  40 mg Oral BID    folic acid  1,000 mcg Oral Daily    prenatal vitamin  1 tablet Oral Daily      Current Facility-Administered Medications:     acetaminophen, 650 mg, Oral, Q6H PRN    aluminum-magnesium hydroxide-simethicone, 30 mL, Oral, Q6H PRN **AND** [COMPLETED] LIDOcaine viscous HCl 2%, 15 mL, Oral, Once PRN    calcium carbonate, 500 mg, Oral, TID PRN    diphenhydrAMINE, 25 mg, Oral, Q4H PRN    diphenhydrAMINE, 25 mg, Intravenous, Q4H PRN    ondansetron, 8 mg, Oral, Q8H PRN    senna-docusate 8.6-50 mg, 1 tablet, Oral, Nightly PRN    simethicone, 1 tablet, Oral, Q6H PRN    sodium chloride 0.9%, 10 mL, Intravenous, PRN   Objective:   /69   Pulse 96   Temp 97.9 °F (36.6 °C) (Oral)   Resp 16   Ht 5' 6" (1.676 m)   Wt 105.4 kg (232 lb 5.8 oz)   LMP 2023 (Exact Date)   SpO2 98%   Breastfeeding No   BMI 37.50 kg/m²     Focused Physical Examination   General: well developed, no acute distress  Pulmonary: respiratory effort normal with no retractions  Abdomen: gravid, non-TTP  Cervix: deferred    Fetal Monitoring  EFM:   A 140, mod [place], + accels, - decels, reassuring and appropriate for gestational age   B 130, mod [place], + accels, - decels, reassuring and appropriate for gestational age  C 155, mod [place], + accels. - decels, reassuring and appropriate for gestational age  D 130  mod [place], + accels. - " decels; difficult to discern, but from [id number] appears to have 2 accelerations and no decelerations  No contractions on toco    Lab Results  Recent Labs   Lab 10/30/24  1148 24  1827   WBC 9.54 11.60   HGB 9.2* 10.4*   HCT 28.5* 32.1*    220   BUN  --  9   CREATININE  --  0.6   ALT  --  25   AST  --  37   MG  --  1.8   PHOS  --  3.0        Assessment:   29 y.o.  at 30w3d admitted for PTL in the setting of quad-quad pregnancy.    Plan:   PTL   - currently asymptomatic  - s/p BMZ (11/3-)   - s/p indocin tocolysis through steroid window   - NST BID  - regular diet  - Neonatology notified  - plan for MgSO4 and CS if patient dilates further    2. cHTN  - no home meds   - pre-e labs stable    3. Quad- Quad pregnancy   - Baby A: EFW 1091g 26% AC 2% @ 28w4d   - Baby B: EFW 1237g 42% AC 41% @ 28w4d   - Baby C: EFW 1253g 43% AC 33% @ 28w4d   - Baby D: EFW 1195g 37% AC 32% @ 28w4d     4. GERD  - symptoms improved today  - Pepcid BID, Tums at bedside    Cira Campbell MD   OB/GYN PGY-3

## 2024-11-06 VITALS
BODY MASS INDEX: 37.35 KG/M2 | HEART RATE: 93 BPM | HEIGHT: 66 IN | DIASTOLIC BLOOD PRESSURE: 71 MMHG | TEMPERATURE: 98 F | SYSTOLIC BLOOD PRESSURE: 128 MMHG | WEIGHT: 232.38 LBS | RESPIRATION RATE: 15 BRPM | OXYGEN SATURATION: 99 %

## 2024-11-06 DIAGNOSIS — A60.9 HSV (HERPES SIMPLEX VIRUS) ANOGENITAL INFECTION: ICD-10-CM

## 2024-11-06 LAB
ABO + RH BLD: NORMAL
BLD GP AB SCN CELLS X3 SERPL QL: NORMAL
BLD PROD TYP BPU: NORMAL
BLD PROD TYP BPU: NORMAL
BLOOD UNIT EXPIRATION DATE: NORMAL
BLOOD UNIT EXPIRATION DATE: NORMAL
BLOOD UNIT TYPE CODE: 5100
BLOOD UNIT TYPE CODE: 5100
BLOOD UNIT TYPE: NORMAL
BLOOD UNIT TYPE: NORMAL
CODING SYSTEM: NORMAL
CODING SYSTEM: NORMAL
CROSSMATCH INTERPRETATION: NORMAL
CROSSMATCH INTERPRETATION: NORMAL
DISPENSE STATUS: NORMAL
DISPENSE STATUS: NORMAL
NUM UNITS TRANS PACKED RBC: NORMAL
NUM UNITS TRANS PACKED RBC: NORMAL
SPECIMEN OUTDATE: NORMAL

## 2024-11-06 PROCEDURE — 25000003 PHARM REV CODE 250

## 2024-11-06 PROCEDURE — 99238 HOSP IP/OBS DSCHRG MGMT 30/<: CPT | Mod: 25,,, | Performed by: OBSTETRICS & GYNECOLOGY

## 2024-11-06 PROCEDURE — 59025 FETAL NON-STRESS TEST: CPT | Mod: 26,,, | Performed by: OBSTETRICS & GYNECOLOGY

## 2024-11-06 PROCEDURE — 36415 COLL VENOUS BLD VENIPUNCTURE: CPT

## 2024-11-06 PROCEDURE — 86850 RBC ANTIBODY SCREEN: CPT

## 2024-11-06 RX ORDER — AMOXICILLIN 250 MG
1 CAPSULE ORAL NIGHTLY PRN
Qty: 30 TABLET | Refills: 1 | Status: SHIPPED | OUTPATIENT
Start: 2024-11-06

## 2024-11-06 RX ORDER — VALACYCLOVIR HYDROCHLORIDE 500 MG/1
500 TABLET, FILM COATED ORAL 2 TIMES DAILY
Qty: 180 TABLET | Refills: 1 | Status: SHIPPED | OUTPATIENT
Start: 2024-11-06

## 2024-11-06 RX ADMIN — FOLIC ACID 1000 MCG: 1 TABLET ORAL at 09:11

## 2024-11-06 RX ADMIN — FAMOTIDINE 40 MG: 20 TABLET ORAL at 09:11

## 2024-11-06 NOTE — DISCHARGE SUMMARY
Discharge Summary  Gynecology      Admit Date: 2024    Discharge Date and Time: 2024     Attending Physician: Gene Martinez MD    Principal Diagnoses:  labor    Active Hospital Problems    Diagnosis  POA    * labor [O60.00]  Yes      Resolved Hospital Problems   No resolved problems to display.       Discharged Condition: good    Hospital Course:   Eve Salazar is a 29 y.o. y.o.  female who presented on 2024 at 30w0d to [facility] with N/V. She was found to be 4/60/-3, and admitted to antepartum for PTL. This IUP is complicated by quad pregnancy, cHTN, HSV, GBS pos.     She was started on MgSO4, indocin tocolysis and received  corticosteroid course (-3). MgSO4 was de-escalated on . After completion of indocin through steroid window, patient remained asymptomatic for >48 hours with no complaints of any contractions or abdominal pain. On day of discharge, patient was asymptomatic. She denied contractions, vaginal bleeding, LOF. Had normal fetal movement x4.     She was discharged home on HD#5 in stable condition with instructions to follow up with Dr. Vasques on Friday.     Consults: None    Significant Diagnostic Studies:  Recent Labs   Lab 24  1827   WBC 11.60   HGB 10.4*   HCT 32.1*   MCV 82           Disposition: Home or Self Care    Patient Instructions:   Current Discharge Medication List        START taking these medications    Details   senna-docusate 8.6-50 mg (PERICOLACE) 8.6-50 mg per tablet Take 1 tablet by mouth nightly as needed for Constipation.  Qty: 30 tablet, Refills: 1           CONTINUE these medications which have NOT CHANGED    Details   ascorbic acid, vitamin C, (VITAMIN C) 100 MG tablet Take 100 mg by mouth once daily.      aspirin (ECOTRIN) 81 MG EC tablet Take 1 tablet (81 mg total) by mouth once daily.  Qty: 180 tablet, Refills: 1      famotidine (PEPCID) 40 MG tablet Take 1 tablet (40 mg total) by mouth once  daily.  Qty: 90 tablet, Refills: 3      ondansetron (ZOFRAN) 4 MG tablet Take 1 tablet (4 mg total) by mouth daily as needed for Nausea.  Qty: 30 tablet, Refills: 1    Associated Diagnoses: Nausea and vomiting of pregnancy, antepartum      valACYclovir (VALTREX) 500 MG tablet Take 1 tablet (500 mg total) by mouth 2 (two) times daily.  Qty: 90 tablet, Refills: 0    Associated Diagnoses: HSV (herpes simplex virus) anogenital infection           STOP taking these medications       folic acid (FOLVITE) 800 MCG Tab Comments:   Reason for Stopping:         prenatal vit no.124/iron/folic (PRENATAL VITAMIN ORAL) Comments:   Reason for Stopping:               Discharge Procedure Orders   Diet Adult Regular     Other restrictions (specify):   Order Comments: Notify MD if bleeding 1 pad/hour for 2 consecutive hours.     No driving until:   Order Comments: Do not drive while taking narcotics.     Pelvic Rest   Order Comments: Pelvic rest until cleared by MD. Nothing in vagina till cleared by MD including tampons, douching, intercourse     No dressing needed     Notify your health care provider if you experience any of the following:  temperature >100.4     Notify your health care provider if you experience any of the following:  persistent nausea and vomiting or diarrhea     Notify your health care provider if you experience any of the following:  severe uncontrolled pain     Notify your health care provider if you experience any of the following:  difficulty breathing or increased cough     Notify your health care provider if you experience any of the following:  severe persistent headache     Notify your health care provider if you experience any of the following:  persistent dizziness, light-headedness, or visual disturbances     Notify your health care provider if you experience any of the following:  increased confusion or weakness     Notify your health care provider if you experience any of the following:   Order  Comments: Notify MD if bleeding 1 pad/hour for 2 consecutive hours.     Activity as tolerated        Follow-up Information       Laura Vasques MD Follow up in 1 week(s).    Specialty: Obstetrics and Gynecology  Why: PTL follow up  Contact information:  [address] [address] [phone]

## 2024-11-06 NOTE — DISCHARGE INSTRUCTIONS
Contact your primary OB or after hours at [phone] if you experience any of the following:    Contractions every 7-10 minutes for 1 or more hours.   A sudden gush or constant leaking of fluid.  Heavy vaginal bleeding.   If you experience a constant headache, blurry vision, pain underneath your right rib, or sudden swelling of your hands, feet, and face.   If you are 28 weeks pregnant or greater, you can measure kick counts with a goal of 10 or more movements within 2 hours.     Remember to stay hydrated; drink 8-10 bottles of water a day.     Maintain all follow-up appointments.

## 2024-11-06 NOTE — CARE UPDATE
PM NST    Fetus A: 140 bpm, mod variability, + accels, - decels; overall reassuring and appropriate for gestationl age      Fetus B: 145 bpm, mod variability, + accels, + single variable decel to 60s <1 min at 2018, monitoring for additional 30+ min with no further decels; overall reassuring and appropriate for gestational age      Fetus C: 140 bpm, mod variability, + accels, - decels; overall reassuring and appropriate for gestational age      Fetus D: 145 bpm, mod variability, + accels, - decels; overall reassuring and appropriate for gestational age      No contractions on toco    Continue NST BID      Andrzej Nieves MD MS  OB/Gyn  PGY-2

## 2024-11-06 NOTE — TELEPHONE ENCOUNTER
Refill Routing Note   Medication(s) are not appropriate for processing by [facility] for the following reason(s):      New or recently adjusted medication    [facility] action(s):  Defer Care Due:  None identified            Appointments  past 12m or future 3m with PCP    Date Provider   Last Visit   10/30/2024 Laura Vasques MD   Next Visit   11/8/2024 Laura Vasques MD   ED visits in past 90 days: 0        Note composed:4:03 PM 11/06/2024

## 2024-11-06 NOTE — PLAN OF CARE
Pt rested well overnight. Pt denies contractions, leakage of fluid, and vaginal bleeding. Pt endorses normal, active fetal movement. VSS, no falls or injuries. Partner at bedside. All questions answered.      Problem:  Fall Injury Risk  Goal: Absence of Fall, Infant Drop and Related Injury  Outcome: Progressing     Problem: Adult Inpatient Plan of Care  Goal: Plan of Care Review  Outcome: Progressing  Goal: Patient-Specific Goal (Individualized)  Outcome: Progressing  Goal: Absence of Hospital-Acquired Illness or Injury  Outcome: Progressing  Goal: Optimal Comfort and Wellbeing  Outcome: Progressing  Goal: Readiness for Transition of Care  Outcome: Progressing     Problem:  Labor  Goal: Delayed  Delivery  Outcome: Progressing     Problem: Hypertensive Disorders in Pregnancy  Goal: Patient-Fetal Stabilization  Outcome: Progressing

## 2024-11-06 NOTE — CARE UPDATE
AM NST     Fetus A: 140 bpm, mod variability, + accels, - decels; overall reassuring and appropriate for gestationl age      Fetus B: 140 bpm, mod variability, + accels, -decels; overall reassuring and appropriate for gestational age      Fetus C: 130 bpm, mod variability, + accels, - decels; overall reassuring and appropriate for gestational age      Fetus D: 135 bpm, mod variability, + accels, - decels; overall reassuring and appropriate for gestational age      No contractions on toco    Consider discharge this afternoon as patient has had no complaints and no contractions on toco    Tita Johnson MD, MPH  OBGYN PGY-4

## 2024-11-06 NOTE — PROGRESS NOTES
"Maternal Fetal Medicine  Progress Note          Subjective:    Eve Salazar is a 29 y.o.  admitted on 24 at 30w0d for PTL. Please see H&P for details regarding presentation at admission. This pregnancy is complicated by quad-quad pregnancy and cHTN.    Interim HPI:   Today, patient is 30w4d. Doing well, resting comfortably in bed. Denies contractions overnight. Denies LOF or VB. Reports good fetal movement x4. Otherwise asymptomatic.     Medical, Surgical, Social, Family, and Obstetric History: reviewed in chart  Current Medications:    aspirin  81 mg Oral Daily    famotidine  40 mg Oral BID    folic acid  1,000 mcg Oral Daily    prenatal vitamin  1 tablet Oral Daily      Current Facility-Administered Medications:     acetaminophen, 650 mg, Oral, Q6H PRN    aluminum-magnesium hydroxide-simethicone, 30 mL, Oral, Q6H PRN **AND** [COMPLETED] LIDOcaine viscous HCl 2%, 15 mL, Oral, Once PRN    calcium carbonate, 500 mg, Oral, TID PRN    diphenhydrAMINE, 25 mg, Oral, Q4H PRN    diphenhydrAMINE, 25 mg, Intravenous, Q4H PRN    ondansetron, 8 mg, Oral, Q8H PRN    senna-docusate 8.6-50 mg, 1 tablet, Oral, Nightly PRN    simethicone, 1 tablet, Oral, Q6H PRN    sodium chloride 0.9%, 10 mL, Intravenous, PRN   Objective:   /60   Pulse 96   Temp 98.2 °F (36.8 °C) (Oral)   Resp 16   Ht 5' 6" (1.676 m)   Wt 105.4 kg (232 lb 5.8 oz)   LMP 2023 (Exact Date)   SpO2 99%   Breastfeeding No   BMI 37.50 kg/m²     Focused Physical Examination   General: well developed, no acute distress  Pulmonary: respiratory effort normal with no retractions  Abdomen: gravid, non-TTP  Cervix: deferred    Fetal Monitoring  EFM PM :   Fetus A: 140 bpm, mod variability, + accels, - decels; overall reassuring and appropriate for gestationl age      Fetus B: 145 bpm, mod variability, + accels, + single variable decel to 60s <1 min at 2018, monitoring for additional 30+ min with no further decels; overall reassuring and " appropriate for gestational age      Fetus C: 140 bpm, mod variability, + accels, - decels; overall reassuring and appropriate for gestational age      Fetus D: 145 bpm, mod variability, + accels, - decels; overall reassuring and appropriate for gestational age      No contractions on toco    Lab Results  Recent Labs   Lab 10/30/24  1148 24  1827   WBC 9.54 11.60   HGB 9.2* 10.4*   HCT 28.5* 32.1*    220   BUN  --  9   CREATININE  --  0.6   ALT  --  25   AST  --  37   MG  --  1.8   PHOS  --  3.0        Assessment:   29 y.o.  at 30w4d admitted for PTL in the setting of quad-quad pregnancy.    Plan:   PTL   - currently asymptomatic  - s/p BMZ (11/3-)   - s/p indocin tocolysis through steroid window   - NST BID  - regular diet  - Neonatology notified  - plan for MgSO4 and CS if patient dilates further    2. cHTN  - no home meds   - pre-e labs stable    3. Quad- Quad pregnancy   - Baby A: EFW 1091g 26% AC 2% @ 28w4d   - Baby B: EFW 1237g 42% AC 41% @ 28w4d   - Baby C: EFW 1253g 43% AC 33% @ 28w4d   - Baby D: EFW 1195g 37% AC 32% @ 28w4d     4. GERD  - symptoms improved today  - Pepcid BID, Tums at bedside    Cira Campbell MD   OB/GYN PGY-3

## 2024-11-06 NOTE — PLAN OF CARE
Problem: Adult Inpatient Plan of Care  Goal: Plan of Care Review  Outcome: Progressing     Problem: Adult Inpatient Plan of Care  Goal: Patient-Specific Goal (Individualized)  Outcome: Progressing     Problem: Adult Inpatient Plan of Care  Goal: Absence of Hospital-Acquired Illness or Injury  Outcome: Progressing     Problem: Adult Inpatient Plan of Care  Goal: Optimal Comfort and Wellbeing  Outcome: Progressing     Problem: Adult Inpatient Plan of Care  Goal: Readiness for Transition of Care  Outcome: Progressing     Problem:  Labor  Goal: Delayed  Delivery  Outcome: Progressing     Problem: Hypertensive Disorders in Pregnancy  Goal: Patient-Fetal Stabilization  Outcome: Progressing

## 2024-11-07 ENCOUNTER — TELEPHONE (OUTPATIENT)
Dept: OBSTETRICS AND GYNECOLOGY | Facility: CLINIC | Age: 29
End: 2024-11-07
Payer: OTHER GOVERNMENT

## 2024-11-08 ENCOUNTER — ROUTINE PRENATAL (OUTPATIENT)
Dept: OBSTETRICS AND GYNECOLOGY | Facility: CLINIC | Age: 29
End: 2024-11-08
Payer: OTHER GOVERNMENT

## 2024-11-08 VITALS
SYSTOLIC BLOOD PRESSURE: 141 MMHG | BODY MASS INDEX: 38.04 KG/M2 | WEIGHT: 235.69 LBS | DIASTOLIC BLOOD PRESSURE: 84 MMHG

## 2024-11-08 DIAGNOSIS — O30.233: ICD-10-CM

## 2024-11-08 DIAGNOSIS — A60.9 HSV (HERPES SIMPLEX VIRUS) ANOGENITAL INFECTION: Primary | ICD-10-CM

## 2024-11-08 DIAGNOSIS — Z3A.30 30 WEEKS GESTATION OF PREGNANCY: ICD-10-CM

## 2024-11-08 PROCEDURE — 99999 PR PBB SHADOW E&M-EST. PATIENT-LVL III: CPT | Mod: PBBFAC,,, | Performed by: OBSTETRICS & GYNECOLOGY

## 2024-11-08 NOTE — LETTER
2024      [facility]  [address], [address]  [facility] [address]  Phone: [phone]  Fax: [phone]       Patient: Eve Salazar   YOB: 1995  Date of Visit: 2024    To Whom It May Concern:    Chika Salazar's wife vEe is expecting quadruplets. We plan to deliver on 2024. It is my medical opinion that his wife will require his assistance in order to fully recover from surgery for 12 weeks post partum. There will also be a nearly unimaginable amount of  care required in their household. He should be granted any permissible time at home to help his family adjust and to provide necessary care for his wife, son, and four new daughters. If you have any questions or concerns, or if I can be of further assistance, please do not hesitate to contact me.    Sincerely,      Laura Vasques MD

## 2024-11-08 NOTE — PROGRESS NOTES
30w6d with no change in pelvic pressure since discharge from hospital two days ago. Low back pain when she first gets out of bed, but otherwise asymptomatic.   Hibiclense and C/S info sheet given.  RTC in 1 week for routine PNC. Delivery planned for 32 weeks.

## 2024-11-12 ENCOUNTER — OFFICE VISIT (OUTPATIENT)
Dept: MATERNAL FETAL MEDICINE | Facility: CLINIC | Age: 29
End: 2024-11-12
Payer: OTHER GOVERNMENT

## 2024-11-12 ENCOUNTER — CLINICAL SUPPORT (OUTPATIENT)
Dept: OBSTETRICS AND GYNECOLOGY | Facility: CLINIC | Age: 29
End: 2024-11-12
Payer: OTHER GOVERNMENT

## 2024-11-12 ENCOUNTER — PROCEDURE VISIT (OUTPATIENT)
Dept: MATERNAL FETAL MEDICINE | Facility: CLINIC | Age: 29
End: 2024-11-12
Payer: OTHER GOVERNMENT

## 2024-11-12 VITALS
BODY MASS INDEX: 38.11 KG/M2 | DIASTOLIC BLOOD PRESSURE: 86 MMHG | WEIGHT: 236.13 LBS | SYSTOLIC BLOOD PRESSURE: 133 MMHG

## 2024-11-12 DIAGNOSIS — O30.201 QUADRUPLET GESTATION IN FIRST TRIMESTER, UNSPECIFIED MULTIPLE GESTATION TYPE: ICD-10-CM

## 2024-11-12 DIAGNOSIS — O30.231 QUADRACHORIONIC QUADRA-AMNIOTIC QUADRUPLET PREGNANCY IN FIRST TRIMESTER: ICD-10-CM

## 2024-11-12 DIAGNOSIS — O36.5991: ICD-10-CM

## 2024-11-12 DIAGNOSIS — O30.233: Primary | ICD-10-CM

## 2024-11-12 DIAGNOSIS — O30.203: Primary | ICD-10-CM

## 2024-11-12 DIAGNOSIS — O10.919 CHRONIC HYPERTENSION IN PREGNANCY: ICD-10-CM

## 2024-11-12 PROCEDURE — 76816 OB US FOLLOW-UP PER FETUS: CPT | Mod: 59,S$GLB,, | Performed by: OBSTETRICS & GYNECOLOGY

## 2024-11-12 PROCEDURE — 96372 THER/PROPH/DIAG INJ SC/IM: CPT | Mod: S$GLB,,, | Performed by: OBSTETRICS & GYNECOLOGY

## 2024-11-12 PROCEDURE — 76819 FETAL BIOPHYS PROFIL W/O NST: CPT | Mod: S$GLB,,, | Performed by: OBSTETRICS & GYNECOLOGY

## 2024-11-12 PROCEDURE — 99999 PR PBB SHADOW E&M-EST. PATIENT-LVL III: CPT | Mod: PBBFAC,,, | Performed by: OBSTETRICS & GYNECOLOGY

## 2024-11-12 PROCEDURE — 99214 OFFICE O/P EST MOD 30 MIN: CPT | Mod: 25,S$GLB,, | Performed by: OBSTETRICS & GYNECOLOGY

## 2024-11-12 PROCEDURE — 99999 PR PBB SHADOW E&M-EST. PATIENT-LVL I: CPT | Mod: PBBFAC,,,

## 2024-11-12 RX ORDER — BETAMETHASONE SODIUM PHOSPHATE AND BETAMETHASONE ACETATE 3; 3 MG/ML; MG/ML
12 INJECTION, SUSPENSION INTRA-ARTICULAR; INTRALESIONAL; INTRAMUSCULAR; SOFT TISSUE
Status: COMPLETED | OUTPATIENT
Start: 2024-11-12 | End: 2024-11-13

## 2024-11-12 RX ADMIN — BETAMETHASONE SODIUM PHOSPHATE AND BETAMETHASONE ACETATE 12 MG: 3; 3 INJECTION, SUSPENSION INTRA-ARTICULAR; INTRALESIONAL; INTRAMUSCULAR; SOFT TISSUE at 03:11

## 2024-11-12 NOTE — ASSESSMENT & PLAN NOTE
The patients fetus has been diagnosed with FGR based on AC < 10th percentile (EFW 18%). Potential etiologies of FGR include but are not limited to normal variation of stature, placental insufficiency, chromosomal abnormalities, genetic disorders, infections, medical conditions, teratogen exposure and other etiologies. Pregnancies complicated by FGR are at increased risk of stillbirth. We discussed delivery timing and recommendations for antepartum testing.   We discussed this new diagnosis in the setting of know Quad/Quad IUP. Given the overall reassuring testing, overall delivery recommendations would not change unless PNT becomes nonreassuring.    Recommendations:  Start twice weekly  fetal surveillance with NST and BPP until delivery.   Start weekly UA dopplers.   Patient counseled re: fetal movement monitoring and decreased fetal movement  Monitor closely for any signs of evolving preeclampsia.  If  testing is non-reassuring, delivery may be warranted regardless of GA.  For all pregnancies with FGR, the placenta should be sent to pathology for examination.    Patient scheduled for delivery at 32w3d (next Wednesday)  Patient did receive initial dose of steroids on 11/3- --> will dose rescue steroids starting today to cover through delivery timing. Patient amenable and sent to injection clinic.   Dr. Vasques informed of plan and agrees.

## 2024-11-12 NOTE — PROGRESS NOTES
Maternal Fetal Medicine follow up consult     SUBJECTIVE:     Eve Salazar is a 29 y.o.  female with IUP at 31w3d who is seen in follow up consultation by MFM.  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   No changes to medical, surgical, family, social, or obstetric history.    Interval history since last MFM visit:   Patient has no complaints today. She is overall feeling well.  Patient denies any contractions/cramping, vaginal bleeding or leakage of fluid.  She reports good fetal movement x 4    Medications:  Current Outpatient Medications   Medication Instructions    ascorbic acid (vitamin C) (VITAMIN C) 100 mg, Daily    aspirin (ECOTRIN) 81 mg, Oral, Daily    famotidine (PEPCID) 40 mg, Oral, Daily    ondansetron (ZOFRAN) 4 mg, Oral, Daily PRN    prenatal no115/iron/folic acid (PRENATAL 19 ORAL) 1 tablet, Daily    senna-docusate 8.6-50 mg (PERICOLACE) 8.6-50 mg per tablet 1 tablet, Oral, Nightly PRN    valACYclovir (VALTREX) 500 mg, Oral, 2 times daily     Care team members:  Lora - Primary OB     OBJECTIVE:   /86 (BP Location: Left arm, Patient Position: Sitting)   Wt 107.1 kg (236 lb 1.8 oz)   LMP 2023 (Exact Date)   BMI 38.11 kg/m²     Physical Exam:  Deferred    Ultrasound performed. See viewpoint for full ultrasound report.  Quadrichorionic-Quadriamniotic pregnancy with cardiac activity seen in all four.    A = Fetal size is consistent with fetal growth restriction, with the EFW plotting at the 18% (1559g) and the AC plotting at the 3%.   A limited repeat fetal anatomic survey appears normal.   The BPP score is reassuring at 8/8.  The MVP is normal.  Umbilical artery dopplers are normal with persistent forward flow, S/D ratio 33%  cephalic left  lower presentation.   B = Fetal size is AGA with the EFW at the 34%. AC is at the 38%.  Normal repeat limited fetal anatomic survey.  MVP is normal.    The BPP score is reassuring at 8/8.  C = Fetal size is AGA with the EFW at the 25%. AC is at the 40%  Normal repeat limited fetal anatomic survey.  MVP is normal.  The BPP score is reassuring at 8/8.  D = Fetal size is AGA with the EFW at the 27%. AC is at the 36%  Normal repeat limited fetal anatomic survey.   MVP is normal.   The BPP score is reassuring at 8/8.    Significant labs/imaging:  None new    ASSESSMENT/PLAN:     29 y.o.  female with IUP at 31w3d    Poor fetal growth affecting management of mother, antepartum, unspecified trimester, fetus 1  The patients fetus has been diagnosed with FGR based on AC < 10th percentile (EFW 18%). Potential etiologies of FGR include but are not limited to normal variation of stature, placental insufficiency, chromosomal abnormalities, genetic disorders, infections, medical conditions, teratogen exposure and other etiologies. Pregnancies complicated by FGR are at increased risk of stillbirth. We discussed delivery timing and recommendations for antepartum testing.   We discussed this new diagnosis in the setting of know Quad/Quad IUP. Given the overall reassuring testing, overall delivery recommendations would not change unless PNT becomes nonreassuring.    Recommendations:  Start twice weekly  fetal surveillance with NST and BPP until delivery.   Start weekly UA dopplers.   Patient counseled re: fetal movement monitoring and decreased fetal movement  Monitor closely for any signs of evolving preeclampsia.  If  testing is non-reassuring, delivery may be warranted regardless of GA.  For all pregnancies with FGR, the placenta should be sent to pathology for examination.    Patient scheduled for delivery at 32w3d (next Wednesday)  Patient did receive initial dose of steroids on 11/3- --> will dose  rescue steroids starting today to cover through delivery timing. Patient amenable and sent to injection clinic.   Dr. Vasques informed of plan and agrees.        Quadrachorionic quadra-amniotic quadruplet pregnancy in first trimester  Please see original consult for full counseling and recommendations   We briefly reviewed the risks associated with higher order multifetal gestation.  Feeling well overall at this time, only with some exhaustion and general discomforts.     Recommendations:  Folic acid 1 mg daily and low-dose aspirin (81 mg daily) for preeclampsia risk reduction.    Close monitoring for development of preeclampsia.  If admitted to the hospital for antepartum indications would recommend prophylactic Lovenox daily according to the [facility] thromboprophylaxis guidelines.     Delivery per above.      Chronic hypertension in pregnancy  Previously diagnosed with cHTN during last pregnancy. Currently on no meds. Overall asymptomatic.  Encouraged patient to perform Connected MoM pressures at home to determine range.    Recommendations (Please refer to [facility] guidelines):  Continue aspirin 81 mg daily for preeclampsia risk reduction  Continue with CONNECTED MoMs  Continued close observation of patient's blood pressures - if concern, may need further evaluation for PreE and even consider need for delivery if severe.        Patient was counseled that prenatal ultrasound studies have limitations. They do not detect all fetal, genetic, placental, and maternal abnormalities.     FOLLOW UP: No further ultrasounds or visits were scheduled.     The patient was given an opportunity to ask questions about the management of her high risk pregnancy problems. She expressed an understanding of and agreement to the above impression and plan. All questions were answered to her satisfaction.        Ronny Hlalman MD   Maternal-Fetal Medicine      Electronically Signed by Ronny Hallman November 12, 2024

## 2024-11-12 NOTE — ASSESSMENT & PLAN NOTE
Previously diagnosed with cHTN during last pregnancy. Currently on no meds. Overall asymptomatic.  Encouraged patient to perform Connected MoM pressures at home to determine range.    Recommendations (Please refer to [facility] guidelines):  Continue aspirin 81 mg daily for preeclampsia risk reduction  Continue with CONNECTED MoMs  Continued close observation of patient's blood pressures - if concern, may need further evaluation for PreE and even consider need for delivery if severe.

## 2024-11-12 NOTE — ASSESSMENT & PLAN NOTE
Please see original consult for full counseling and recommendations   We briefly reviewed the risks associated with higher order multifetal gestation.  Feeling well overall at this time, only with some exhaustion and general discomforts.     Recommendations:  Folic acid 1 mg daily and low-dose aspirin (81 mg daily) for preeclampsia risk reduction.    Close monitoring for development of preeclampsia.  If admitted to the hospital for antepartum indications would recommend prophylactic Lovenox daily according to the [facility] thromboprophylaxis guidelines.     Delivery per above.     <-- Click to add NO pertinent Family History

## 2024-11-12 NOTE — PROGRESS NOTES
Patient reports for Betamethasone injection. Patient without complaint of pain at this time, injection given. Tolerated well no pain noted post injection advised to wait in lobby 15 minutes and report any adverse reactions.      12 mg / 2 ML given of Betamethasone   Site: LB

## 2024-11-13 ENCOUNTER — CLINICAL SUPPORT (OUTPATIENT)
Dept: OBSTETRICS AND GYNECOLOGY | Facility: CLINIC | Age: 29
End: 2024-11-13
Payer: OTHER GOVERNMENT

## 2024-11-13 VITALS
DIASTOLIC BLOOD PRESSURE: 87 MMHG | SYSTOLIC BLOOD PRESSURE: 142 MMHG | BODY MASS INDEX: 38.23 KG/M2 | WEIGHT: 236.88 LBS

## 2024-11-13 DIAGNOSIS — O30.233: Primary | ICD-10-CM

## 2024-11-13 DIAGNOSIS — Z3A.31 31 WEEKS GESTATION OF PREGNANCY: ICD-10-CM

## 2024-11-13 DIAGNOSIS — A60.9 HSV (HERPES SIMPLEX VIRUS) ANOGENITAL INFECTION: ICD-10-CM

## 2024-11-13 PROCEDURE — 0502F SUBSEQUENT PRENATAL CARE: CPT | Mod: S$GLB,,, | Performed by: OBSTETRICS & GYNECOLOGY

## 2024-11-13 PROCEDURE — 99999 PR PBB SHADOW E&M-EST. PATIENT-LVL I: CPT | Mod: PBBFAC,,,

## 2024-11-13 PROCEDURE — 96372 THER/PROPH/DIAG INJ SC/IM: CPT | Mod: S$GLB,,, | Performed by: OBSTETRICS & GYNECOLOGY

## 2024-11-13 PROCEDURE — 99999 PR PBB SHADOW E&M-EST. PATIENT-LVL III: CPT | Mod: PBBFAC,,, | Performed by: OBSTETRICS & GYNECOLOGY

## 2024-11-13 RX ORDER — BETAMETHASONE SODIUM PHOSPHATE AND BETAMETHASONE ACETATE 3; 3 MG/ML; MG/ML
6 INJECTION, SUSPENSION INTRA-ARTICULAR; INTRALESIONAL; INTRAMUSCULAR; SOFT TISSUE
Status: SHIPPED | OUTPATIENT
Start: 2024-11-13

## 2024-11-13 RX ADMIN — BETAMETHASONE SODIUM PHOSPHATE AND BETAMETHASONE ACETATE 12 MG: 3; 3 INJECTION, SUSPENSION INTRA-ARTICULAR; INTRALESIONAL; INTRAMUSCULAR; SOFT TISSUE at 12:11

## 2024-11-13 NOTE — PROGRESS NOTES
Ordering Provider: Dr. Vasques    Pt received BMZ#2 to RIGHT glut.  Pt tolerated injection well.  Pt was instructed to wait 15 minutes to monitor for signs and symptoms of any reactions.  Pt has no further questions, comments, or concerns at this time.

## 2024-11-16 ENCOUNTER — PATIENT MESSAGE (OUTPATIENT)
Dept: OBSTETRICS AND GYNECOLOGY | Facility: CLINIC | Age: 29
End: 2024-11-16
Payer: OTHER GOVERNMENT

## 2024-11-16 ENCOUNTER — PATIENT MESSAGE (OUTPATIENT)
Dept: OTHER | Facility: OTHER | Age: 29
End: 2024-11-16
Payer: OTHER GOVERNMENT

## 2024-11-18 RX ORDER — HYDROCORTISONE ACETATE 25 MG/1
25 SUPPOSITORY RECTAL 2 TIMES DAILY
Qty: 20 SUPPOSITORY | Refills: 0 | Status: ON HOLD | OUTPATIENT
Start: 2024-11-18 | End: 2024-11-28

## 2024-11-19 NOTE — H&P
HISTORY AND PHYSICAL                                                OBSTETRICS          Subjective:      Eve Salazar is a 29 y.o.  female with IUP at 32w4d gestation who presents to L&D for repeat  section. Pertinent medical history for this pregnancy includes quadruplet pregnancy (4 separate amnions and chorions), cHTN (not on meds) , HSV (on valtrex), GBS+, and h/o C/S x 1. Of note, quadruplet A has been recently lagging in growth and now meets criteria for growth restriction. Pt received steroids on  and . She denies contractions, vaginal bleeding, and leakage of fluid.  Reports normal fetal movement. Care this pregnancy has been with Dr. Vasques.    PMHx:   Past Medical History:   Diagnosis Date    HSV (herpes simplex virus) anogenital infection        PSHx:   Past Surgical History:   Procedure Laterality Date     SECTION N/A 2022    Procedure:  SECTION;  Surgeon: Laura Vasques MD;  Location: [facility] L&D;  Service: OB/GYN;  Laterality: N/A;       All: Review of patient's allergies indicates:  No Known Allergies    Meds:   No medications prior to admission.       SH:   Social History     Socioeconomic History    Marital status:    Tobacco Use    Smoking status: Former    Smokeless tobacco: Never   Substance and Sexual Activity    Alcohol use: Not Currently     Alcohol/week: 2.0 standard drinks of alcohol     Types: 2 Glasses of wine per week    Drug use: No    Sexual activity: Yes     Partners: Male     Birth control/protection: None       FH:   Family History   Problem Relation Name Age of Onset    Breast cancer Neg Hx      Cancer Neg Hx      Colon cancer Neg Hx      Diabetes Neg Hx      Eclampsia Neg Hx      Hypertension Neg Hx      Miscarriages / Stillbirths Neg Hx      Ovarian cancer Neg Hx       labor Neg Hx      Stroke Neg Hx         OBHx:   OB History    Para Term  AB Living   2 1 1 0 0 1   SAB IAB Ectopic  Multiple Live Births   0 0 0 0 1      # Outcome Date GA Lbr Klever/2nd Weight Sex Type Anes PTL Lv   2 Current            1 Term 22 38w2d  3.91 kg (8 lb 9.9 oz) M CS-LTranv Spinal, EPI N BUTCH      Complications: Failure to Progress in First Stage      Name: ELLIOT SALAZAR      Apgar1: 9  Apgar5: 9       Objective:      BP Readings from Last 3 Encounters:   24 (!) 142/87   24 133/86   24 (!) 141/84       BMI Readings from Last 1 Encounters:   24 38.23 kg/m²       General:   alert and cooperative   HEENT:  normocephalic, atraumatic   Lungs:   Normal work of breathing   Heart:   Normal cap refill   Abdomen:  gravid, non-tender   Extremities non-tender, no edema   Derm: no rashes or lesions   Psych: appropriate mood and affect   Pelvis:  deferred         Lab Review  Lab Results   Component Value Date    GROUPTRH O POS 2024    HGB 10.4 (L) 2024    HCT 32.1 (L) 2024     2024    RUBELLAIMMUN Reactive 2024    HEPBSAG Non-reactive 2024    [id number] Negative 10/30/2024    RPR Non-reactive 2022    LABCHLA Not Detected 2024    LABNGO Not Detected 2024    LABURIN  2024     Multiple organisms isolated. None in predominance.  Repeat if    LABURIN clinically necessary. 2024    OBGLUCOSESCR 150 (H) 2024    STREPBCULT No Group B Streptococcus isolated 2022    [id number] Negative 2024         Assessment:     29 y.o.  at 32w3d gestation here for  delivery 2/2  quadruplet pregnancy  2. H/o C/S x 1  3. HSV on valtrex  4. cHTN - no meds  5. GBS+  6. Iron deficiency anemia     Plan:     1. Risks, benefits, alternatives and possible complications of  delivery and transfusion have been discussed in detail with the patient. All questions have been answered, and Ms. Salazar has voiced understanding and agrees to the treatment plan.  2. Consents signed and in chart  3. Admit to Labor and Delivery unit for  repeat  delivery

## 2024-11-20 ENCOUNTER — HOSPITAL ENCOUNTER (INPATIENT)
Facility: OTHER | Age: 29
LOS: 4 days | Discharge: HOME | End: 2024-11-24
Attending: OBSTETRICS & GYNECOLOGY | Admitting: OBSTETRICS & GYNECOLOGY
Payer: OTHER GOVERNMENT

## 2024-11-20 ENCOUNTER — ANESTHESIA (OUTPATIENT)
Dept: OBSTETRICS AND GYNECOLOGY | Facility: OTHER | Age: 29
End: 2024-11-20
Payer: OTHER GOVERNMENT

## 2024-11-20 DIAGNOSIS — I10 HTN (HYPERTENSION), BENIGN: ICD-10-CM

## 2024-11-20 DIAGNOSIS — Z98.891 S/P CESAREAN SECTION: ICD-10-CM

## 2024-11-20 DIAGNOSIS — O36.5991: ICD-10-CM

## 2024-11-20 DIAGNOSIS — O30.231 QUADRACHORIONIC QUADRA-AMNIOTIC QUADRUPLET PREGNANCY IN FIRST TRIMESTER: ICD-10-CM

## 2024-11-20 DIAGNOSIS — O10.919 CHRONIC HYPERTENSION IN PREGNANCY: ICD-10-CM

## 2024-11-20 DIAGNOSIS — D50.8 IRON DEFICIENCY ANEMIA SECONDARY TO INADEQUATE DIETARY IRON INTAKE: ICD-10-CM

## 2024-11-20 DIAGNOSIS — A60.9 HSV (HERPES SIMPLEX VIRUS) ANOGENITAL INFECTION: ICD-10-CM

## 2024-11-20 DIAGNOSIS — Z98.891 STATUS POST REPEAT LOW TRANSVERSE CESAREAN SECTION: Primary | ICD-10-CM

## 2024-11-20 DIAGNOSIS — O30.209: ICD-10-CM

## 2024-11-20 PROBLEM — Z34.90 PREGNANT: Status: RESOLVED | Noted: 2021-09-13 | Resolved: 2024-11-20

## 2024-11-20 LAB
ABO + RH BLD: NORMAL
BASOPHILS # BLD AUTO: 0.01 K/UL (ref 0–0.2)
BASOPHILS NFR BLD: 0.1 % (ref 0–1.9)
BLD GP AB SCN CELLS X3 SERPL QL: NORMAL
DIFFERENTIAL METHOD BLD: ABNORMAL
EOSINOPHIL # BLD AUTO: 0.1 K/UL (ref 0–0.5)
EOSINOPHIL NFR BLD: 0.5 % (ref 0–8)
ERYTHROCYTE [DISTWIDTH] IN BLOOD BY AUTOMATED COUNT: 14.9 % (ref 11.5–14.5)
HCT VFR BLD AUTO: 29.4 % (ref 37–48.5)
HGB BLD-MCNC: 9 G/DL (ref 12–16)
IMM GRANULOCYTES # BLD AUTO: 0.07 K/UL (ref 0–0.04)
IMM GRANULOCYTES NFR BLD AUTO: 0.6 % (ref 0–0.5)
LYMPHOCYTES # BLD AUTO: 2.9 K/UL (ref 1–4.8)
LYMPHOCYTES NFR BLD: 26.9 % (ref 18–48)
MCH RBC QN AUTO: 24.7 PG (ref 27–31)
MCHC RBC AUTO-ENTMCNC: 30.6 G/DL (ref 32–36)
MCV RBC AUTO: 81 FL (ref 82–98)
MONOCYTES # BLD AUTO: 0.9 K/UL (ref 0.3–1)
MONOCYTES NFR BLD: 8.1 % (ref 4–15)
NEUTROPHILS # BLD AUTO: 7 K/UL (ref 1.8–7.7)
NEUTROPHILS NFR BLD: 63.8 % (ref 38–73)
NRBC BLD-RTO: 0 /100 WBC
PLATELET # BLD AUTO: 203 K/UL (ref 150–450)
PMV BLD AUTO: 10.9 FL (ref 9.2–12.9)
RBC # BLD AUTO: 3.64 M/UL (ref 4–5.4)
SPECIMEN OUTDATE: NORMAL
TREPONEMA PALLIDUM IGG+IGM AB [PRESENCE] IN SERUM OR PLASMA BY IMMUNOASSAY: NONREACTIVE
WBC # BLD AUTO: 10.91 K/UL (ref 3.9–12.7)

## 2024-11-20 PROCEDURE — 58611 LIGATE OVIDUCT(S) ADD-ON: CPT | Mod: ,,, | Performed by: OBSTETRICS & GYNECOLOGY

## 2024-11-20 PROCEDURE — 88302 TISSUE EXAM BY PATHOLOGIST: CPT | Performed by: PATHOLOGY

## 2024-11-20 PROCEDURE — 59510 CESAREAN DELIVERY: CPT | Mod: 22,,, | Performed by: OBSTETRICS & GYNECOLOGY

## 2024-11-20 PROCEDURE — 25000003 PHARM REV CODE 250: Performed by: OBSTETRICS & GYNECOLOGY

## 2024-11-20 PROCEDURE — 85025 COMPLETE CBC W/AUTO DIFF WBC: CPT

## 2024-11-20 PROCEDURE — 25000003 PHARM REV CODE 250

## 2024-11-20 PROCEDURE — 36004725 HC OB OR TIME LEV III - EA ADD 15 MIN: Performed by: OBSTETRICS & GYNECOLOGY

## 2024-11-20 PROCEDURE — 37000008 HC ANESTHESIA 1ST 15 MINUTES: Performed by: OBSTETRICS & GYNECOLOGY

## 2024-11-20 PROCEDURE — 63600175 PHARM REV CODE 636 W HCPCS: Performed by: OBSTETRICS & GYNECOLOGY

## 2024-11-20 PROCEDURE — 11000001 HC ACUTE MED/SURG PRIVATE ROOM

## 2024-11-20 PROCEDURE — 25000003 PHARM REV CODE 250: Performed by: ANESTHESIOLOGY

## 2024-11-20 PROCEDURE — C1751 CATH, INF, PER/CENT/MIDLINE: HCPCS | Performed by: ANESTHESIOLOGY

## 2024-11-20 PROCEDURE — 37000009 HC ANESTHESIA EA ADD 15 MINS: Performed by: OBSTETRICS & GYNECOLOGY

## 2024-11-20 PROCEDURE — 36004724 HC OB OR TIME LEV III - 1ST 15 MIN: Performed by: OBSTETRICS & GYNECOLOGY

## 2024-11-20 PROCEDURE — 71000033 HC RECOVERY, INTIAL HOUR: Performed by: OBSTETRICS & GYNECOLOGY

## 2024-11-20 PROCEDURE — 51702 INSERT TEMP BLADDER CATH: CPT

## 2024-11-20 PROCEDURE — 86593 SYPHILIS TEST NON-TREP QUANT: CPT

## 2024-11-20 PROCEDURE — 59514 CESAREAN DELIVERY ONLY: CPT | Mod: 82,22,, | Performed by: OBSTETRICS & GYNECOLOGY

## 2024-11-20 PROCEDURE — 58611 LIGATE OVIDUCT(S) ADD-ON: CPT | Mod: 82,,, | Performed by: OBSTETRICS & GYNECOLOGY

## 2024-11-20 PROCEDURE — 86900 BLOOD TYPING SEROLOGIC ABO: CPT

## 2024-11-20 PROCEDURE — 0UB70ZZ EXCISION OF BILATERAL FALLOPIAN TUBES, OPEN APPROACH: ICD-10-PCS | Performed by: OBSTETRICS & GYNECOLOGY

## 2024-11-20 PROCEDURE — 86920 COMPATIBILITY TEST SPIN: CPT

## 2024-11-20 PROCEDURE — 88302 TISSUE EXAM BY PATHOLOGIST: CPT | Mod: 26,,, | Performed by: PATHOLOGY

## 2024-11-20 PROCEDURE — 71000039 HC RECOVERY, EACH ADD'L HOUR: Performed by: OBSTETRICS & GYNECOLOGY

## 2024-11-20 PROCEDURE — 63600175 PHARM REV CODE 636 W HCPCS: Performed by: ANESTHESIOLOGY

## 2024-11-20 RX ORDER — SODIUM CITRATE AND CITRIC ACID MONOHYDRATE 334; 500 MG/5ML; MG/5ML
30 SOLUTION ORAL
Status: COMPLETED | OUTPATIENT
Start: 2024-11-20 | End: 2024-11-20

## 2024-11-20 RX ORDER — OXYCODONE HYDROCHLORIDE 10 MG/1
10 TABLET ORAL EVERY 4 HOURS PRN
Status: DISCONTINUED | OUTPATIENT
Start: 2024-11-20 | End: 2024-11-24 | Stop reason: HOSPADM

## 2024-11-20 RX ORDER — FENTANYL CITRATE 50 UG/ML
INJECTION, SOLUTION INTRAMUSCULAR; INTRAVENOUS
Status: DISCONTINUED | OUTPATIENT
Start: 2024-11-20 | End: 2024-11-20

## 2024-11-20 RX ORDER — FAMOTIDINE 10 MG/ML
20 INJECTION INTRAVENOUS
Status: COMPLETED | OUTPATIENT
Start: 2024-11-20 | End: 2024-11-20

## 2024-11-20 RX ORDER — PROCHLORPERAZINE EDISYLATE 5 MG/ML
5 INJECTION INTRAMUSCULAR; INTRAVENOUS EVERY 6 HOURS PRN
Status: DISCONTINUED | OUTPATIENT
Start: 2024-11-20 | End: 2024-11-24 | Stop reason: HOSPADM

## 2024-11-20 RX ORDER — ONDANSETRON 8 MG/1
8 TABLET, ORALLY DISINTEGRATING ORAL EVERY 8 HOURS PRN
Status: DISCONTINUED | OUTPATIENT
Start: 2024-11-20 | End: 2024-11-24 | Stop reason: HOSPADM

## 2024-11-20 RX ORDER — MORPHINE SULFATE 0.5 MG/ML
INJECTION, SOLUTION EPIDURAL; INTRATHECAL; INTRAVENOUS
Status: DISCONTINUED | OUTPATIENT
Start: 2024-11-20 | End: 2024-11-20

## 2024-11-20 RX ORDER — SODIUM CHLORIDE, SODIUM LACTATE, POTASSIUM CHLORIDE, CALCIUM CHLORIDE 600; 310; 30; 20 MG/100ML; MG/100ML; MG/100ML; MG/100ML
INJECTION, SOLUTION INTRAVENOUS CONTINUOUS PRN
Status: DISCONTINUED | OUTPATIENT
Start: 2024-11-20 | End: 2024-11-20

## 2024-11-20 RX ORDER — DIPHENHYDRAMINE HYDROCHLORIDE 50 MG/ML
12.5 INJECTION INTRAMUSCULAR; INTRAVENOUS
Status: DISCONTINUED | OUTPATIENT
Start: 2024-11-20 | End: 2024-11-24 | Stop reason: HOSPADM

## 2024-11-20 RX ORDER — SIMETHICONE 80 MG
1 TABLET,CHEWABLE ORAL EVERY 6 HOURS PRN
Status: DISCONTINUED | OUTPATIENT
Start: 2024-11-20 | End: 2024-11-24 | Stop reason: HOSPADM

## 2024-11-20 RX ORDER — ACETAMINOPHEN 500 MG
1000 TABLET ORAL
Status: COMPLETED | OUTPATIENT
Start: 2024-11-20 | End: 2024-11-20

## 2024-11-20 RX ORDER — PROCHLORPERAZINE EDISYLATE 5 MG/ML
5 INJECTION INTRAMUSCULAR; INTRAVENOUS EVERY 6 HOURS PRN
OUTPATIENT
Start: 2024-11-20

## 2024-11-20 RX ORDER — KETOROLAC TROMETHAMINE 30 MG/ML
30 INJECTION, SOLUTION INTRAMUSCULAR; INTRAVENOUS
Status: COMPLETED | OUTPATIENT
Start: 2024-11-20 | End: 2024-11-21

## 2024-11-20 RX ORDER — SODIUM CHLORIDE, SODIUM LACTATE, POTASSIUM CHLORIDE, CALCIUM CHLORIDE 600; 310; 30; 20 MG/100ML; MG/100ML; MG/100ML; MG/100ML
INJECTION, SOLUTION INTRAVENOUS CONTINUOUS
Status: DISCONTINUED | OUTPATIENT
Start: 2024-11-20 | End: 2024-11-23

## 2024-11-20 RX ORDER — ONDANSETRON HYDROCHLORIDE 2 MG/ML
4 INJECTION, SOLUTION INTRAVENOUS EVERY 6 HOURS PRN
OUTPATIENT
Start: 2024-11-20 | End: 2024-11-21

## 2024-11-20 RX ORDER — OXYTOCIN-SODIUM CHLORIDE 0.9% IV SOLN 30 UNIT/500ML 30-0.9/5 UT/ML-%
95 SOLUTION INTRAVENOUS CONTINUOUS PRN
Status: DISCONTINUED | OUTPATIENT
Start: 2024-11-20 | End: 2024-11-24 | Stop reason: HOSPADM

## 2024-11-20 RX ORDER — HYDROCORTISONE 25 MG/G
CREAM TOPICAL 3 TIMES DAILY PRN
Status: DISCONTINUED | OUTPATIENT
Start: 2024-11-20 | End: 2024-11-24 | Stop reason: HOSPADM

## 2024-11-20 RX ORDER — MORPHINE SULFATE 4 MG/ML
4 INJECTION, SOLUTION INTRAMUSCULAR; INTRAVENOUS ONCE
Status: DISCONTINUED | OUTPATIENT
Start: 2024-11-20 | End: 2024-11-23

## 2024-11-20 RX ORDER — ACETAMINOPHEN 325 MG/1
650 TABLET ORAL
Status: DISCONTINUED | OUTPATIENT
Start: 2024-11-20 | End: 2024-11-24 | Stop reason: HOSPADM

## 2024-11-20 RX ORDER — PRENATAL WITH FERROUS FUM AND FOLIC ACID 3080; 920; 120; 400; 22; 1.84; 3; 20; 10; 1; 12; 200; 27; 25; 2 [IU]/1; [IU]/1; MG/1; [IU]/1; MG/1; MG/1; MG/1; MG/1; MG/1; MG/1; UG/1; MG/1; MG/1; MG/1; MG/1
1 TABLET ORAL DAILY
Status: DISCONTINUED | OUTPATIENT
Start: 2024-11-20 | End: 2024-11-24 | Stop reason: HOSPADM

## 2024-11-20 RX ORDER — OXYCODONE HYDROCHLORIDE 5 MG/1
5 TABLET ORAL EVERY 4 HOURS PRN
Status: DISCONTINUED | OUTPATIENT
Start: 2024-11-20 | End: 2024-11-24 | Stop reason: HOSPADM

## 2024-11-20 RX ORDER — NALBUPHINE HYDROCHLORIDE 10 MG/ML
5 INJECTION INTRAMUSCULAR; INTRAVENOUS; SUBCUTANEOUS ONCE AS NEEDED
Status: DISCONTINUED | OUTPATIENT
Start: 2024-11-20 | End: 2024-11-24 | Stop reason: HOSPADM

## 2024-11-20 RX ORDER — AMOXICILLIN 250 MG
1 CAPSULE ORAL NIGHTLY PRN
Status: DISCONTINUED | OUTPATIENT
Start: 2024-11-20 | End: 2024-11-24 | Stop reason: HOSPADM

## 2024-11-20 RX ORDER — MISOPROSTOL 200 UG/1
800 TABLET ORAL ONCE AS NEEDED
Status: DISCONTINUED | OUTPATIENT
Start: 2024-11-20 | End: 2024-11-24 | Stop reason: HOSPADM

## 2024-11-20 RX ORDER — MISOPROSTOL 200 UG/1
600 TABLET ORAL ONCE
Status: COMPLETED | OUTPATIENT
Start: 2024-11-20 | End: 2024-11-20

## 2024-11-20 RX ORDER — TRANEXAMIC ACID 100 MG/ML
INJECTION, SOLUTION INTRAVENOUS
Status: DISCONTINUED | OUTPATIENT
Start: 2024-11-20 | End: 2024-11-20

## 2024-11-20 RX ORDER — OXYTOCIN 10 [USP'U]/ML
INJECTION, SOLUTION INTRAMUSCULAR; INTRAVENOUS
Status: DISCONTINUED | OUTPATIENT
Start: 2024-11-20 | End: 2024-11-20

## 2024-11-20 RX ORDER — CEFAZOLIN SODIUM 1 G/3ML
INJECTION, POWDER, FOR SOLUTION INTRAMUSCULAR; INTRAVENOUS
Status: DISCONTINUED | OUTPATIENT
Start: 2024-11-20 | End: 2024-11-20

## 2024-11-20 RX ORDER — SODIUM CHLORIDE, SODIUM LACTATE, POTASSIUM CHLORIDE, CALCIUM CHLORIDE 600; 310; 30; 20 MG/100ML; MG/100ML; MG/100ML; MG/100ML
INJECTION, SOLUTION INTRAVENOUS CONTINUOUS
Status: DISCONTINUED | OUTPATIENT
Start: 2024-11-20 | End: 2024-11-20

## 2024-11-20 RX ORDER — DIPHENHYDRAMINE HCL 25 MG
25 CAPSULE ORAL EVERY 6 HOURS PRN
Status: DISCONTINUED | OUTPATIENT
Start: 2024-11-20 | End: 2024-11-24 | Stop reason: HOSPADM

## 2024-11-20 RX ORDER — ONDANSETRON HYDROCHLORIDE 2 MG/ML
INJECTION, SOLUTION INTRAMUSCULAR; INTRAVENOUS
Status: DISCONTINUED | OUTPATIENT
Start: 2024-11-20 | End: 2024-11-20

## 2024-11-20 RX ORDER — CARBOPROST TROMETHAMINE 250 UG/ML
250 INJECTION, SOLUTION INTRAMUSCULAR
Status: DISCONTINUED | OUTPATIENT
Start: 2024-11-20 | End: 2024-11-24 | Stop reason: HOSPADM

## 2024-11-20 RX ORDER — HYDROCODONE BITARTRATE AND ACETAMINOPHEN 500; 5 MG/1; MG/1
TABLET ORAL
Status: DISCONTINUED | OUTPATIENT
Start: 2024-11-20 | End: 2024-11-24 | Stop reason: HOSPADM

## 2024-11-20 RX ORDER — TRANEXAMIC ACID 10 MG/ML
1000 INJECTION, SOLUTION INTRAVENOUS EVERY 30 MIN PRN
Status: DISCONTINUED | OUTPATIENT
Start: 2024-11-20 | End: 2024-11-24 | Stop reason: HOSPADM

## 2024-11-20 RX ORDER — ONDANSETRON HYDROCHLORIDE 2 MG/ML
4 INJECTION, SOLUTION INTRAVENOUS EVERY 6 HOURS PRN
Status: DISCONTINUED | OUTPATIENT
Start: 2024-11-20 | End: 2024-11-24 | Stop reason: HOSPADM

## 2024-11-20 RX ORDER — IBUPROFEN 400 MG/1
800 TABLET ORAL
Status: DISCONTINUED | OUTPATIENT
Start: 2024-11-21 | End: 2024-11-24 | Stop reason: HOSPADM

## 2024-11-20 RX ORDER — DOCUSATE SODIUM 100 MG/1
200 CAPSULE, LIQUID FILLED ORAL 2 TIMES DAILY
Status: DISCONTINUED | OUTPATIENT
Start: 2024-11-20 | End: 2024-11-24 | Stop reason: HOSPADM

## 2024-11-20 RX ORDER — METHYLERGONOVINE MALEATE 0.2 MG/ML
200 INJECTION INTRAVENOUS ONCE AS NEEDED
Status: DISCONTINUED | OUTPATIENT
Start: 2024-11-20 | End: 2024-11-24 | Stop reason: HOSPADM

## 2024-11-20 RX ORDER — DEXAMETHASONE SODIUM PHOSPHATE 4 MG/ML
INJECTION, SOLUTION INTRA-ARTICULAR; INTRALESIONAL; INTRAMUSCULAR; INTRAVENOUS; SOFT TISSUE
Status: DISCONTINUED | OUTPATIENT
Start: 2024-11-20 | End: 2024-11-20

## 2024-11-20 RX ADMIN — KETOROLAC TROMETHAMINE 30 MG: 30 INJECTION, SOLUTION INTRAMUSCULAR; INTRAVENOUS at 02:11

## 2024-11-20 RX ADMIN — ACETAMINOPHEN 650 MG: 325 TABLET, FILM COATED ORAL at 08:11

## 2024-11-20 RX ADMIN — PHENYLEPHRINE HYDROCHLORIDE 0.5 MCG/KG/MIN: 10 INJECTION INTRAVENOUS at 07:11

## 2024-11-20 RX ADMIN — KETOROLAC TROMETHAMINE 30 MG: 30 INJECTION, SOLUTION INTRAMUSCULAR; INTRAVENOUS at 08:11

## 2024-11-20 RX ADMIN — DEXAMETHASONE SODIUM PHOSPHATE 4 MG: 4 INJECTION INTRA-ARTICULAR; INTRALESIONAL; INTRAMUSCULAR; INTRAVENOUS; SOFT TISSUE at 07:11

## 2024-11-20 RX ADMIN — ACETAMINOPHEN 1000 MG: 500 TABLET, FILM COATED ORAL at 06:11

## 2024-11-20 RX ADMIN — TRANEXAMIC ACID 500 MG: 100 INJECTION, SOLUTION INTRAVENOUS at 07:11

## 2024-11-20 RX ADMIN — FAMOTIDINE 20 MG: 10 INJECTION, SOLUTION INTRAVENOUS at 06:11

## 2024-11-20 RX ADMIN — ONDANSETRON 4 MG: 2 INJECTION INTRAMUSCULAR; INTRAVENOUS at 07:11

## 2024-11-20 RX ADMIN — OXYTOCIN 10 UNITS: 10 INJECTION, SOLUTION INTRAMUSCULAR; INTRAVENOUS at 07:11

## 2024-11-20 RX ADMIN — OXYTOCIN 5 UNITS: 10 INJECTION, SOLUTION INTRAMUSCULAR; INTRAVENOUS at 07:11

## 2024-11-20 RX ADMIN — OXYCODONE HYDROCHLORIDE 5 MG: 5 TABLET ORAL at 09:11

## 2024-11-20 RX ADMIN — MORPHINE SULFATE 0.1 MG: 0.5 INJECTION, SOLUTION EPIDURAL; INTRATHECAL; INTRAVENOUS at 07:11

## 2024-11-20 RX ADMIN — SODIUM CHLORIDE, POTASSIUM CHLORIDE, SODIUM LACTATE AND CALCIUM CHLORIDE: 600; 310; 30; 20 INJECTION, SOLUTION INTRAVENOUS at 07:11

## 2024-11-20 RX ADMIN — ONDANSETRON 4 MG: 2 INJECTION INTRAMUSCULAR; INTRAVENOUS at 12:11

## 2024-11-20 RX ADMIN — SODIUM CITRATE AND CITRIC ACID MONOHYDRATE 30 ML: 500; 334 SOLUTION ORAL at 06:11

## 2024-11-20 RX ADMIN — Medication 95 MILLI-UNITS/MIN: at 08:11

## 2024-11-20 RX ADMIN — MISOPROSTOL 600 MCG: 200 TABLET ORAL at 10:11

## 2024-11-20 RX ADMIN — CEFAZOLIN 2 G: 330 INJECTION, POWDER, FOR SOLUTION INTRAMUSCULAR; INTRAVENOUS at 07:11

## 2024-11-20 RX ADMIN — ACETAMINOPHEN 650 MG: 325 TABLET, FILM COATED ORAL at 02:11

## 2024-11-20 RX ADMIN — DOCUSATE SODIUM 200 MG: 100 CAPSULE, LIQUID FILLED ORAL at 08:11

## 2024-11-20 RX ADMIN — FENTANYL CITRATE 10 MCG: 50 INJECTION INTRAMUSCULAR; INTRAVENOUS at 07:11

## 2024-11-20 RX ADMIN — SODIUM CHLORIDE, POTASSIUM CHLORIDE, SODIUM LACTATE AND CALCIUM CHLORIDE: 600; 310; 30; 20 INJECTION, SOLUTION INTRAVENOUS at 06:11

## 2024-11-20 NOTE — PLAN OF CARE
VSS. Pain well controlled with PRN and scheduled medications. Uterus midline and firm. Light lochia. Hand expression initiated. SCDs maintained. Scott draining to gravity with adequate UOP. Family frequently at bedside supporting patient. Call light within reach, safety maintained.

## 2024-11-20 NOTE — DISCHARGE INSTRUCTIONS
Community Resources for Breastfeeding Mothers:   Hospital Breastfeeding Centers/ Lactation Consultants:   [facility]........................................................................................[phone]  Outpatient Lactation Consults               [phone]   [facility]....................[phone]   [facility]..........................................................................................[phone]   [facility].................................................[phone]   [facility].......................................................[phone]   [facility].................................[phone]   [facility].......................................[phone]   [facility]..................................................[phone]   [facility].....................................................................[phone]      [facility] (Poison Control)...........[phone]  PoisonHelp.org   Free medical advice 24/7 through the Poison Help Line and the online tool      Online Resources:   International Breastfeeding [place] ...............................................................................ibconline.ca   Dr Anthony Tom online resource provides videos, articles, and information sheets.     Coeffective...............................................................................................................coeffective.com   Download the free mobile enedina to help get off to a great start with breastfeeding.   Droplet.....................................................................................................................Tag'By.com   Global  Health Media...........................................................................................TravelZeeky.org   Videos that teach and empower mothers and caregivers   Infant [facility].............................................................................[phone]      [employer]   Provides up to date information for medication use by moms during pregnancy and while breastfeeding.   Kina Murphy....................................................................................................................kellymom.com   Provides online information on breastfeeding and parenting      La Leche League........................................................................................ lllalmsla.org   /   llli.org   Mother-to-mother support groups with education, information support, and encouragement    Work and Pump........................................................................................... LawbitDocs.com   Information about breastfeeding for working moms     [place] Resources:   [place] Breastfeeding Coalition............................... 1-575-300-3652    [facility]s Healthing.[facility]   Find local breastfeeding support   [place] Breastfeeding Support............................................................ LaBreastfeedingSport.org   Zip code search of breastfeeding resources in your area   [facility] for Healthy Babies............................................................[phone]   6701835ngkx.org   Connects [place] moms and their families to health and pregnancy resources.  24/7   WIC (Women, Infant, Children)......................................................... 9-850-185-9122   ldh.la.gov/WIC   Download the [place] enedina, get code from WIC office    [facility] Resources:     Baby Cafe............................................................................................................. babycafeusa.org   Free, drop in, informal  breastfeeding support groups offering professional lactation care and intervention.    [facility] [place] [facility]....................................... birthmarkCapeco.com   Infant feeding drop in clinics, Lactation services, support groups, education programs   [facility]...............................................[phone]   [employer].com/groups/[facility]   Free breastfeeding support group for families of color   Mothers Milk Bank [facility]....................................................[phone]                                                             Inhance MediasAppknox.Textbroker/services/mothers-milk-bank-at[facility]   TRISHA Nesting..................................................................................[phone] nolanesting.com   In person and virtual support for families through pregnancy, birth, and early parenthood.       [facility] [place]- Dr. Bernie العلي.......................[phone]   [address] [place] [address]                                  [email or web address]   [email or web address]   "[employer] Lactation Care, [facility] (Lashell Singh RN, IBCLC) ............................[phone] [email or web address] ([email or web address]) [email or web address]    Healthy Start [place].....................................[phone] ([place])  [phone] ([place])   [facility].gov/health-department/healthy-start   Serves women of childbearing age and addresses issues for pregnant women and their children from birth  to age two.          La Leche League- [place]............................. [employer]ated [employer] Measurement (IMMI)/ [employer].Integrated Media Measurement (IMMI)/ NovImmuneyokasta   In person and virtual mother to mother support groups with education, information support and   encouragement to women who want to breastfeed      [place] Resources:   Breastfeeding Resources- [facility].....msdh.ms.gov (under womens services)   Find resources and info about planning for breastfeeding, its benefits, and help with breastfeeding  s uccessfully.    [facility]regnancy Choices- [place]....................................... GoMetro   [phone]   [address] [place], [place]. Call or text 24/7   [facility].......................................................[email or web address].LookBooker   [facility] Lactation Consultants sere [facility], including [facility],   [facility] [place], and surrounding areas.    [place] Breastfeeding Coalition...............................................................................msbfc.org   Promotes and supports breastfeeding with families, health providers, and communities.   [facility] breastfeeding Coalition.....................................................................smbfc.org   Find breastfeeding resources and support groups in your area.    WIC Nutrition Program- [facility].................................... [place].gov

## 2024-11-20 NOTE — LACTATION NOTE
This note was copied from a baby's chart.  Lactation met DAD briefly at baby's bedside. He reports mom plans to breastfeed/provide ebm for their babies. DANIA briefly discussed with DAD the importance of EARLY and FREQUENT stimulation/emptying of the breasts to facilitate milk production for nicu babies. Encouraged Dad to share this with and assist mom with early/frequent pumping/hand expression. Will follow.

## 2024-11-20 NOTE — L&D DELIVERY NOTE
[facility]   Section   Operative Note    SUMMARY     Date of Procedure: 2024    Procedure:   Procedure(s) (LRB):   SECTION (N/A)    Surgeon(s) and Role:  Laura Vasques    Assitant:  Madie Dougherty M.D.    Pre-Operative Diagnosis:   29 y.o.  at 32w4d with quadrachorionic quadraamniotic pregnancy  H/o C/S x 1  cHTN - not on meds  HSV  GBS+  Growth restriction of Quad A  Undesired fertility  Iron deficiency anemia    Post-Operative Diagnosis:   29 y.o.  s/p LTCD with BTL 2/2 quadrachorionic quadraamniotic pregnancy and h/o C/S x 1  2.  cHTN - not on meds  3.  HSV  4.  GBS+  5.  Growth restriction of Quad A  6.  Undesired fertility  7.  Iron deficiency anemia    Anesthesia: Spinal/Epidural           Description of the Findings of the Procedure: Normal appearing uterus, bilateral tubes and ovaries.     Complications: No    Blood Loss: 585 cc    Indication:   29 y.o.  at 32w4d with quadrachorionic quadraamniotic pregnancy and h/o C/S x 1.    Procedure in detail:    The patient was taken to the operating room where epidural/spinal anesthesia was found to be adequate. She was then prepped and draped in the normal sterile fashion. A esposito catheter was in place prior to start of the surgery. Adequate anesthesia was then confirmed using allis clamps.    After a Time Out was performed, a scalpel was used to make a Pfannensteil incision. The scalpel was used to carry down to the underlying layer of fascia. The fascia was then incised in the midline. The curved disla scissors were then used to extend the fascia incision laterally. The fascia was then elevated using the kocher clamps and extended both inferiorly and superiorly using the curved disla scissors. The rectus muscles were then  in the midline and the peritoneum was then entered and extended bluntly. The peritoneal opening did not require further widening.  The bladder blade was then  "inserted and the vesicouterine peritoneum was entered sharply with the Metzenbaum scissors, extended laterally, and the bladder flap was created digitally. The bladder blade was then reinserted.     The inside scalpel was then used to make a low transverse incision in the uterus. This was extended bluntly. The amniotic fluid of quad A, "Anupama" was noted to be clear. In conjunction with fundal pressure provided by assistant, the fetal head was delivered. The remainder of the infant delivered atraumatically.  The nose and mouth were suctioned with the bulb suction. The cord was clamped 1 minute after delivery, and the baby was handed off to awaiting pediatric attendant.     Gestational sac for quad 2, "Charlotte" was then ruptured. The amniotic fluid was noted to be clear. In conjunction with fundal pressure provided by assistant, the fetal head was delivered. The remainder of the infant delivered atraumatically.  The nose and mouth were suctioned with the bulb suction. The cord was clamped 1 minute after delivery, and the baby was handed off to awaiting pediatric attendant.    Gestational sac for quad 3, "Psalm"  was then ruptured. The amniotic fluid was noted to be clear. Footling breech noted. One foot and then the other carefully delivered through the hysterotomy. Fetal buttocks and hips carefully delivered. Lower portion of  wrapped in a blue towel and infant gently delivered to the level of the axilla. Bilateral arms swept down one at a time. Then will gentle fundal pressure, the fetal head was flexed and delivered atraumatically.  The nose and mouth were suctioned with the bulb suction. The cord was clamped 1 minute after delivery, and the baby was handed off to awaiting pediatric attendant.    Gestational sac for quad 4 "Fallyn" was then ruptured. The amniotic fluid was noted to be clear. In conjunction with fundal pressure provided by assistant, the fetal head was delivered. The remainder of the infant " delivered atraumatically.  The nose and mouth were suctioned with the bulb suction. The cord was clamped 1 minute after delivery, and the baby was handed off to awaiting pediatric attendant.    The placenta was then removed and the uterus was exteriorized and cleared of all clots and debris. The bladder blade was then reinserted.     The uterine incision was then closed using a #1 Vicryl in a running locked suture. A second suture of #1 Vicryl was used to over-sew a second layer. The hysterotomy was examined good hemostasis was noted. The abdomen was then cleared of all clots and debris. The uterus was then returned to the abdomen. The bladder blade was replaced and the uterine incision was reexamined and noted to have excellent hemostasis.     The peritoneum and rectus muscles were then re-approximated using 2-0 vicryl suture. The rectus muscles were then inspected and and bleeding areas were cauterized using the Bovie. When hemostasis was confirmed the fascia was then closed using #1 Vicryl in a running fashion. The subcutaneous tissue was irrigated, then judiciously cauterized as needed. 2-0  vicryl  was then used to re approximate this layer. 4-0 Monocryl was then used to close the skin in a subcuticular fashion. The patient tolerated the procedure well. Sponge lap and needle counts were correct x 2. A qualified resident was not available for this procedure.         Condition: Good    VTE Risk Mitigation (From admission, onward)           Ordered     Place sequential compression device  Until discontinued        Comments: Discontinue when catheter removed    24 [id number]                    Disposition: PACU - hemodynamically stable.            Martin, DEEPALI Prado [[id number]]   Delivery Information for A Maximino Salazar    Birth information:  YOB: 2024   Time of birth: 7:36 AM   Sex: female   Head Delivery Date/Time: 2024  7:36 AM   Delivery type: , Low Transverse   Gestational  Age: 32w4d       Delivery Providers    Delivering clinician: Laura Vasques MD   Provider Role    Madie Dougherty MD Korndorffer, Amy, Perla Topete Paige N, RN King, Bailey, Mackenzie Martinez RN               Measurements    Weight:   Length:          Apgars    Living status: Living  Apgar Component Scores:  1 min.:  5 min.:  10 min.:  15 min.:  20 min.:    Skin color:  0  1       Heart rate:  2  1       Reflex irritability:  1  2       Muscle tone:  1  2       Respiratory effort:  2  2       Total:  6  8       Apgars assigned by: NICU         Operative Delivery    Forceps attempted?: No  Vacuum extractor attempted?: No         Shoulder Dystocia    Shoulder dystocia present?: No           Presentation    Presentation: Vertex  Position: Middle Occiput Anterior           Interventions/Resuscitation    Method: NICU Attended       Cord    Vessels: 3 vessels  Complications: None  Delayed Cord Clamping?: Yes  Cord Clamped Date/Time: 2024  7:37 AM  Cord Blood Disposition: Sent with Baby  Gases Sent?: No  Stem Cell Collection (by MD): No       Placenta    Placenta delivery date/time: 2024 0745  Placenta removal: Manual removal  Placenta appearance: Intact           Labor Events:       labor: Yes     Labor Onset Date/Time:         Dilation Complete Date/Time:         Start Pushing Date/Time:         Start Pushing Date/Time:       Rupture Date/Time:            Rupture type:          Fluid Amount:       Fluid Color:                steroids: Full Course     Antibiotics given for GBS: No     Induction:       Indications for induction:        Augmentation:       Indications for augmentation:       Labor complications:       Additional complications:          Cervical ripening:                     Delivery:      Episiotomy:       Indication for Episiotomy:       Perineal Lacerations:   Repaired:      Periurethral Laceration:   Repaired:     Labial  Laceration:   Repaired:     Sulcus Laceration:   Repaired:     Vaginal Laceration:   Repaired:     Cervical Laceration:   Repaired:     Repair suture:       Repair # of packets:       Last Value - EBL - Nursing (mL):       Sum - EBL - Nursing (mL): 0     Last Value - EBL - Anesthesia (mL):      Calculated QBL (mL): 585     Running total QBL (mL): 585     Vaginal Sweep Performed:       Surgicount Correct:       Vaginal Packing:   Quantity:       Other providers:       Anesthesia    Method: Spinal          Details (if applicable):  Trial of Labor No    Categorization: Repeat    Priority: Routine   Indications for : Multiple Gestation   Incision Type: low transverse     Additional  information:  Forceps:    Vacuum:    Breech:    Observed anomalies    Other (Comments): Bilateral tubal ligation was performed after delivery. Left fallopian tube was removed at 0755. The left fallopian tube was removed at 0758.          TRISH Salazar [[id number]]   Delivery Information for TRISH Salazar    Birth information:  YOB: 2024   Time of birth: 7:38 AM   Sex: female   Head Delivery Date/Time: 2024  7:38 AM   Delivery type: , Low Transverse   Gestational Age: 32w4d       Delivery Providers    Delivering clinician: Laura Vasques MD   Provider Role    Madie Douhgerty MD Korndorffer, Amy, CHRISTIAN Gupta, Cynthia Osborne RN King, Bailey, CHRISTIAN Iyer, Mackenzie PALUMBO, RN               Measurements    Weight:   Length:          Apgars    Living status: Living  Apgar Component Scores:  1 min.:  5 min.:  10 min.:  15 min.:  20 min.:    Skin color:  0  1       Heart rate:  2  2       Reflex irritability:  2  2       Muscle tone:  2  2       Respiratory effort:  2  2       Total:  8  9       Apgars assigned by: NICU         Operative Delivery    Forceps attempted?: No  Vacuum extractor attempted?: No         Shoulder Dystocia     Shoulder dystocia present?: No           Presentation    Presentation: Vertex  Position: Middle Occiput Anterior           Interventions/Resuscitation    Method: NICU Attended       Cord    Vessels: 3 vessels  Complications: None  Delayed Cord Clamping?: Yes  Cord Clamped Date/Time: 2024  7:39 AM  Cord Blood Disposition: Sent with Baby  Gases Sent?: No  Stem Cell Collection (by MD): No       Placenta    Placenta delivery date/time: 2024 0745  Placenta removal: Manual removal  Placenta appearance: Intact           Labor Events:       labor: Yes     Labor Onset Date/Time:         Dilation Complete Date/Time:         Start Pushing Date/Time:         Start Pushing Date/Time:       Rupture Date/Time:            Rupture type:          Fluid Amount:       Fluid Color:                steroids: Full Course     Antibiotics given for GBS: No     Induction:       Indications for induction:        Augmentation:       Indications for augmentation:       Labor complications:       Additional complications:          Cervical ripening:                     Delivery:      Episiotomy:       Indication for Episiotomy:       Perineal Lacerations:   Repaired:      Periurethral Laceration:   Repaired:     Labial Laceration:   Repaired:     Sulcus Laceration:   Repaired:     Vaginal Laceration:   Repaired:     Cervical Laceration:   Repaired:     Repair suture:       Repair # of packets:       Last Value - EBL - Nursing (mL):       Sum - EBL - Nursing (mL): 0     Last Value - EBL - Anesthesia (mL):      Calculated QBL (mL): 585     Running total QBL (mL): 585     Vaginal Sweep Performed:       Surgicount Correct:       Vaginal Packing:   Quantity:       Other providers:       Anesthesia    Method: Spinal          Details (if applicable):  Trial of Labor No    Categorization: Repeat    Priority: Routine   Indications for : Multiple Gestation   Incision Type: low transverse      Additional  information:  Forceps:    Vacuum:    Breech:    Observed anomalies    Other (Comments):            DESHAWN Salazar [[id number]]   Delivery Information for DESHAWN Salazar    Birth information:  YOB: 2024   Time of birth: 7:40 AM   Sex: female   Head Delivery Date/Time: 2024  7:40 AM   Delivery type: , Low Transverse   Gestational Age: 32w4d       Delivery Providers    Delivering clinician: Laura Vasques MD   Provider Role    Madie Dougherty MD Korndorffer, Amy, CHRISTIAN Gupta, Cynthia Osborne RN King, Bailey, Mackenzie Martinez RN               Measurements    Weight:   Length:          Apgars    Living status: Living  Apgar Component Scores:  1 min.:  5 min.:  10 min.:  15 min.:  20 min.:    Skin color:  0  1       Heart rate:  2  2       Reflex irritability:  2  2       Muscle tone:  2  2       Respiratory effort:  2  2       Total:  8  9       Apgars assigned by: NICU         Operative Delivery    Forceps attempted?: No  Vacuum extractor attempted?: No         Shoulder Dystocia    Shoulder dystocia present?: No           Presentation    Presentation: Footling Breech  Position: Middle Sacrum Posterior           Interventions/Resuscitation    Method: NICU Attended       Cord    Vessels: 3 vessels  Complications: None  Delayed Cord Clamping?: Yes  Cord Clamped Date/Time: 2024  7:41 AM  Gases Sent?: No  Stem Cell Collection (by MD): No       Placenta    Placenta delivery date/time: 2024 0745  Placenta removal: Manual removal  Placenta appearance: Intact           Labor Events:       labor: Yes     Labor Onset Date/Time:         Dilation Complete Date/Time:         Start Pushing Date/Time:         Start Pushing Date/Time:       Rupture Date/Time:            Rupture type:          Fluid Amount:       Fluid Color:                steroids: Full Course     Antibiotics given for GBS: No      Induction:       Indications for induction:        Augmentation:       Indications for augmentation:       Labor complications:       Additional complications:          Cervical ripening:                     Delivery:      Episiotomy:       Indication for Episiotomy:       Perineal Lacerations:   Repaired:      Periurethral Laceration:   Repaired:     Labial Laceration:   Repaired:     Sulcus Laceration:   Repaired:     Vaginal Laceration:   Repaired:     Cervical Laceration:   Repaired:     Repair suture:       Repair # of packets:       Last Value - EBL - Nursing (mL):       Sum - EBL - Nursing (mL): 0     Last Value - EBL - Anesthesia (mL):      Calculated QBL (mL): 585     Running total QBL (mL): 585     Vaginal Sweep Performed:       Surgicount Correct:       Vaginal Packing:   Quantity:       Other providers:       Anesthesia    Method: Spinal          Details (if applicable):  Trial of Labor No    Categorization: Repeat    Priority: Routine   Indications for : Multiple Gestation   Incision Type: low transverse     Additional  information:  Forceps:    Vacuum:    Breech:    Observed anomalies    Other (Comments):            DORIS Salazar [[id number]]   Delivery Information for DORIS Salazar    Birth information:  YOB: 2024   Time of birth: 7:42 AM   Sex: female   Head Delivery Date/Time: 2024  7:42 AM   Delivery type: , Low Transverse   Gestational Age: 32w4d       Delivery Providers    Delivering clinician: Laura Vasques MD   Provider Role    Madie Dougherty MD Korndorffer, Amy, RN Lolan, Christy A Kurtz, Paige N, RN King, Bailey, RN Pettingill, Dawn A, RN               Measurements    Weight:   Length:          Apgars    Living status: Living  Apgar Component Scores:  1 min.:  5 min.:  10 min.:  15 min.:  20 min.:    Skin color:  0  1       Heart rate:  2  2       Reflex irritability:  2  2        Muscle tone:  2  2       Respiratory effort:  2  2       Total:  8  9       Apgars assigned by: NICU         Operative Delivery    Forceps attempted?: No  Vacuum extractor attempted?: No         Shoulder Dystocia    Shoulder dystocia present?: No           Presentation    Presentation: Vertex  Position: Middle Occiput Anterior           Interventions/Resuscitation    Method: NICU Attended       Cord    Vessels: 3 vessels  Complications: None  Delayed Cord Clamping?: Yes  Cord Clamped Date/Time: 2024  7:43 AM  Cord Blood Disposition: Sent with Baby  Gases Sent?: No  Stem Cell Collection (by MD): No       Placenta    Placenta delivery date/time: 2024 0745  Placenta removal: Manual removal  Placenta appearance: Intact           Labor Events:       labor: Yes     Labor Onset Date/Time:         Dilation Complete Date/Time:         Start Pushing Date/Time:         Start Pushing Date/Time:       Rupture Date/Time:            Rupture type:          Fluid Amount:       Fluid Color:                steroids: Full Course     Antibiotics given for GBS: No     Induction:       Indications for induction:        Augmentation:       Indications for augmentation:       Labor complications:       Additional complications:          Cervical ripening:                     Delivery:      Episiotomy:       Indication for Episiotomy:       Perineal Lacerations:   Repaired:      Periurethral Laceration:   Repaired:     Labial Laceration:   Repaired:     Sulcus Laceration:   Repaired:     Vaginal Laceration:   Repaired:     Cervical Laceration:   Repaired:     Repair suture:       Repair # of packets:       Last Value - EBL - Nursing (mL):       Sum - EBL - Nursing (mL): 0     Last Value - EBL - Anesthesia (mL):      Calculated QBL (mL): 585     Running total QBL (mL): 585     Vaginal Sweep Performed:       Surgicount Correct:       Vaginal Packing:   Quantity:       Other providers:       Anesthesia    Method:  Spinal          Details (if applicable):  Trial of Labor No    Categorization: Repeat    Priority: Routine   Indications for : Multiple Gestation   Incision Type: low transverse     Additional  information:  Forceps:    Vacuum:    Breech:    Observed anomalies    Other (Comments):

## 2024-11-20 NOTE — TRANSFER OF CARE
"Anesthesia Transfer of Care Note    Patient: Eve Salazar    Procedure(s) Performed: Procedure(s) (LRB):   SECTION (N/A)    Patient location: Labor and Delivery    Anesthesia Type: CSE    Transport from OR: Transported from OR on room air with adequate spontaneous ventilation    Post pain: adequate analgesia    Post assessment: no apparent anesthetic complications    Post vital signs: stable    Level of consciousness: awake, alert and oriented    Nausea/Vomiting: no nausea/vomiting    Complications: none    Transfer of care protocol was followed      Last vitals: Visit Vitals  BP (!) 150/73   Pulse 78   Temp 36.9 °C (98.4 °F) (Oral)   Resp 18   Ht 5' 6" (1.676 m)   Wt 107.5 kg (236 lb 15.9 oz)   LMP 2023 (Exact Date)   SpO2 100%   Breastfeeding No   BMI 38.25 kg/m²     "

## 2024-11-20 NOTE — CARE UPDATE
To bedside for evaluation of bleeding. Per RN, patient passed a large clot and had increased vaginal bleeding. Fundus firm.    Temp:  [98.1 °F (36.7 °C)-98.4 °F (36.9 °C)] 98.1 °F (36.7 °C)  Pulse:  [] 74  Resp:  [18] 18  SpO2:  [100 %] 100 %  BP: (145-162)/(73-90) 145/82     On exam, fundus now very firm. Cervix approx 1cm dilated without significant palpable clot. Bleeding now minimal.    Will administer 600mcg buccal cytotec  Continue to monitor bleeding closely  Weight pads to monitor QBL closely     Radha Harrell MD  OBGYN, PGY-4

## 2024-11-20 NOTE — LACTATION NOTE
11/20/24 1450   Breasts WDL   Breast WDL WDL   Breast Pumping   Breast Pumping Interventions frequent pumping encouraged   Breast Pumping double electric breast pump utilized   Maternal Feeding Assessment   Maternal Emotional State assist needed;other (see comments)     Lactation note: lactation rounds. Symphony pump use and care reviewed for pumping NICU mother. Pt obtained large puddles in bottom of container. Milk labeled , date, and time. Pt to call for assistance as needed.

## 2024-11-20 NOTE — ANESTHESIA PREPROCEDURE EVALUATION
[facility]  Anesthesia Pre-Operative Evaluation         Patient Name: Eve Salazar  YOB: 1995  MRN: [id number]    2024      Eve Salazar is a 29 y.o. female  @ 32w4d who presents with quad-quad quadruplets and repeat . Pregnancy c.b cHTN, anemia, HSV, and quad/quad/quadruplets.     Denies previous issues with neuraxial anesthesia.    Denies previous issues with general anesthesia.    Denies family history of issues with anesthesia.     Denies hx of seizures, strokes, heart failure, smoking, asthma, COPD, acid reflux, liver disease, kidney disease, bleeding disorders, taking blood thinners, back surgery      OB History    Para Term  AB Living   2 1 1 0 0 1   SAB IAB Ectopic Multiple Live Births   0 0 0 0 1      # Outcome Date GA Lbr Klever/2nd Weight Sex Type Anes PTL Lv   2 Current            1 Term 22 38w2d  3.91 kg (8 lb 9.9 oz) M CS-LTranv Spinal, EPI N BUTCH      Complications: Failure to Progress in First Stage       Review of patient's allergies indicates:  No Known Allergies    Wt Readings from Last 1 Encounters:   24 1126 107.5 kg (236 lb 14.2 oz)       BP Readings from Last 3 Encounters:   24 (!) 142/87   24 133/86   24 (!) 141/84       Patient Active Problem List   Diagnosis    HSV (herpes simplex virus) anogenital infection    Pregnant    HTN (hypertension)    Anemia -- HCT 34    Chronic hypertension in pregnancy    S/P  section    Quadrachorionic quadra-amniotic quadruplet pregnancy in first trimester     labor    Poor fetal growth affecting management of mother, antepartum, unspecified trimester, fetus 1       Past Surgical History:   Procedure Laterality Date     SECTION N/A 2022    Procedure:  SECTION;  Surgeon: Laura Vasques MD;  [facility]: [facility] L&D;  Service: OB/GYN;  Laterality: N/A;         Chemistry        Component Value Date/Time     2024  "1827    K 4.4 11/02/2024 1827     11/02/2024 1827    CO2 15 (L) 11/02/2024 1827    BUN 9 11/02/2024 1827    CREATININE 0.6 11/02/2024 1827    GLU 65 (L) 11/02/2024 1827        Component Value Date/Time    CALCIUM 8.4 (L) 11/02/2024 1827    ALKPHOS 96 11/02/2024 1827    AST 37 11/02/2024 1827    ALT 25 11/02/2024 1827    BILITOT 0.6 11/02/2024 1827    ESTGFRAFRICA >60 04/21/2022 1822    EGFRNONAA >60 04/21/2022 1822            Lab Results   Component Value Date    WBC 10.91 11/20/2024    HGB 9.0 (L) 11/20/2024    HCT 29.4 (L) 11/20/2024    MCV 81 (L) 11/20/2024     11/20/2024       No results for input(s): "PT", "INR", "PROTIME", "APTT" in the last 72 hours.                                                                                                                   11/20/2024  Eve Salazar is a 29 y.o., female.      Pre-op Assessment    I have reviewed the Patient Summary Reports.     I have reviewed the Nursing Notes. I have reviewed the NPO Status.   I have reviewed the Medications.     Review of Systems  Anesthesia Hx:  No problems with previous Anesthesia   History of prior surgery of interest to airway management or planning:          Denies Family Hx of Anesthesia complications.    Denies Personal Hx of Anesthesia complications.                    Hematology/Oncology:       -- Anemia:                                  Cardiovascular:     Hypertension   Denies MI.  Denies CAD.                                          Pulmonary:    Denies COPD.                     Hepatic/GI:      Denies GERD.                Endocrine:        Denies Morbid Obesity / BMI > 40      Physical Exam  General: Well nourished, Cooperative, Alert and Oriented    Airway:  Mallampati: II   Mouth Opening: Normal  TM Distance: Normal  Tongue: Normal  Neck ROM: Normal ROM    Dental:  Intact    Chest/Lungs:  Clear to auscultation, Normal Respiratory Rate    Heart:  Rate: Normal  Rhythm: Regular Rhythm        Anesthesia " Plan  Type of Anesthesia, risks & benefits discussed:    Anesthesia Type: CSE  Intra-op Monitoring Plan: Standard ASA Monitors  Post Op Pain Control Plan: multimodal analgesia and IV/PO Opioids PRN  Informed Consent: Informed consent signed with the Patient and all parties understand the risks and agree with anesthesia plan.  All questions answered. Patient consented to blood products? Yes  ASA Score: 3  Day of Surgery Review of History & Physical: H&P Update referred to the surgeon/provider.    Ready For Surgery From Anesthesia Perspective.     .

## 2024-11-20 NOTE — OPERATIVE NOTE ADDENDUM
Certification of Assistant at Surgery       Surgery Date: 2024     Participating Surgeons:  Surgeons and Role:     * Laura Vasques MD - Primary     * Madie Dougherty MD    Procedures:  Procedure(s) (LRB):   SECTION (N/A)    Assistant Surgeon's Certification of Necessity:  I understand that section 1842 (b) (6) (d) of the Social Security Act generally prohibits Medicare Part B reasonable charge payment for the services of assistants at surgery in [facility] hospitals when qualified residents are available to furnish such services. I certify that the services for which payment is claimed were medically necessary, and that no qualified resident was available to perform the services. I further understand that these services are subject to post-payment review by the Medicare carrier.      Madie Dougherty MD    2024  8:48 AM

## 2024-11-20 NOTE — PLAN OF CARE
POC reviewed with patient and patient's family, verbalized understanding. Fundus firm and bleeding moderate. Pain well controlled with PO medications. One episode of N/V relieived with PRN medication. Voiding per esposito. Patient ambulated in room to wheelchair to visit with infants in NICU. Patient pumping for infants in NICU.

## 2024-11-21 LAB
ALBUMIN SERPL BCP-MCNC: 2.4 G/DL (ref 3.5–5.2)
ALP SERPL-CCNC: 106 U/L (ref 40–150)
ALT SERPL W/O P-5'-P-CCNC: 21 U/L (ref 10–44)
ANION GAP SERPL CALC-SCNC: 7 MMOL/L (ref 8–16)
AST SERPL-CCNC: 32 U/L (ref 10–40)
BASOPHILS # BLD AUTO: 0.04 K/UL (ref 0–0.2)
BASOPHILS NFR BLD: 0.3 % (ref 0–1.9)
BILIRUB SERPL-MCNC: 0.2 MG/DL (ref 0.1–1)
BUN SERPL-MCNC: 12 MG/DL (ref 6–20)
CALCIUM SERPL-MCNC: 9 MG/DL (ref 8.7–10.5)
CHLORIDE SERPL-SCNC: 107 MMOL/L (ref 95–110)
CO2 SERPL-SCNC: 24 MMOL/L (ref 23–29)
CREAT SERPL-MCNC: 0.7 MG/DL (ref 0.5–1.4)
DIFFERENTIAL METHOD BLD: ABNORMAL
EOSINOPHIL # BLD AUTO: 0.1 K/UL (ref 0–0.5)
EOSINOPHIL NFR BLD: 1 % (ref 0–8)
ERYTHROCYTE [DISTWIDTH] IN BLOOD BY AUTOMATED COUNT: 14.8 % (ref 11.5–14.5)
EST. GFR  (NO RACE VARIABLE): >60 ML/MIN/1.73 M^2
GLUCOSE SERPL-MCNC: 104 MG/DL (ref 70–110)
HCT VFR BLD AUTO: 26.6 % (ref 37–48.5)
HGB BLD-MCNC: 8.2 G/DL (ref 12–16)
IMM GRANULOCYTES # BLD AUTO: 0.09 K/UL (ref 0–0.04)
IMM GRANULOCYTES NFR BLD AUTO: 0.7 % (ref 0–0.5)
LYMPHOCYTES # BLD AUTO: 3.4 K/UL (ref 1–4.8)
LYMPHOCYTES NFR BLD: 27.1 % (ref 18–48)
MCH RBC QN AUTO: 24.6 PG (ref 27–31)
MCHC RBC AUTO-ENTMCNC: 30.8 G/DL (ref 32–36)
MCV RBC AUTO: 80 FL (ref 82–98)
MONOCYTES # BLD AUTO: 1 K/UL (ref 0.3–1)
MONOCYTES NFR BLD: 8 % (ref 4–15)
NEUTROPHILS # BLD AUTO: 7.8 K/UL (ref 1.8–7.7)
NEUTROPHILS NFR BLD: 62.9 % (ref 38–73)
NRBC BLD-RTO: 0 /100 WBC
PLATELET # BLD AUTO: 163 K/UL (ref 150–450)
PMV BLD AUTO: 10.9 FL (ref 9.2–12.9)
POTASSIUM SERPL-SCNC: 4.4 MMOL/L (ref 3.5–5.1)
PROT SERPL-MCNC: 5.9 G/DL (ref 6–8.4)
RBC # BLD AUTO: 3.34 M/UL (ref 4–5.4)
SODIUM SERPL-SCNC: 138 MMOL/L (ref 136–145)
WBC # BLD AUTO: 12.41 K/UL (ref 3.9–12.7)

## 2024-11-21 PROCEDURE — 63600175 PHARM REV CODE 636 W HCPCS: Performed by: OBSTETRICS & GYNECOLOGY

## 2024-11-21 PROCEDURE — 25000003 PHARM REV CODE 250: Performed by: OBSTETRICS & GYNECOLOGY

## 2024-11-21 PROCEDURE — 80053 COMPREHEN METABOLIC PANEL: CPT

## 2024-11-21 PROCEDURE — 11000001 HC ACUTE MED/SURG PRIVATE ROOM

## 2024-11-21 PROCEDURE — 85025 COMPLETE CBC W/AUTO DIFF WBC: CPT | Performed by: OBSTETRICS & GYNECOLOGY

## 2024-11-21 PROCEDURE — 36415 COLL VENOUS BLD VENIPUNCTURE: CPT | Performed by: OBSTETRICS & GYNECOLOGY

## 2024-11-21 PROCEDURE — 36415 COLL VENOUS BLD VENIPUNCTURE: CPT

## 2024-11-21 RX ORDER — LANOLIN ALCOHOL/MO/W.PET/CERES
1 CREAM (GRAM) TOPICAL DAILY
Status: DISCONTINUED | OUTPATIENT
Start: 2024-11-21 | End: 2024-11-24 | Stop reason: HOSPADM

## 2024-11-21 RX ADMIN — ACETAMINOPHEN 650 MG: 325 TABLET, FILM COATED ORAL at 01:11

## 2024-11-21 RX ADMIN — DOCUSATE SODIUM 200 MG: 100 CAPSULE, LIQUID FILLED ORAL at 07:11

## 2024-11-21 RX ADMIN — KETOROLAC TROMETHAMINE 30 MG: 30 INJECTION, SOLUTION INTRAMUSCULAR; INTRAVENOUS at 01:11

## 2024-11-21 RX ADMIN — OXYCODONE HYDROCHLORIDE 5 MG: 5 TABLET ORAL at 07:11

## 2024-11-21 RX ADMIN — ACETAMINOPHEN 650 MG: 325 TABLET, FILM COATED ORAL at 02:11

## 2024-11-21 RX ADMIN — ACETAMINOPHEN 650 MG: 325 TABLET, FILM COATED ORAL at 08:11

## 2024-11-21 RX ADMIN — ACETAMINOPHEN 650 MG: 325 TABLET, FILM COATED ORAL at 07:11

## 2024-11-21 RX ADMIN — OXYCODONE HYDROCHLORIDE 5 MG: 5 TABLET ORAL at 02:11

## 2024-11-21 RX ADMIN — IBUPROFEN 800 MG: 400 TABLET ORAL at 08:11

## 2024-11-21 RX ADMIN — PRENATAL VIT W/ FE FUMARATE-FA TAB 27-0.8 MG 1 TABLET: 27-0.8 TAB at 08:11

## 2024-11-21 RX ADMIN — IBUPROFEN 800 MG: 400 TABLET ORAL at 06:11

## 2024-11-21 RX ADMIN — DOCUSATE SODIUM 200 MG: 100 CAPSULE, LIQUID FILLED ORAL at 08:11

## 2024-11-21 NOTE — PLAN OF CARE
Copied from baby's chart    SOCIAL WORK DISCHARGE PLANNING ASSESSMENT     SW completed discharge planning assessment with pt's mother at pt's bedside.  Pt's mother was easily engaged. Education on the role of  was provided. Emotional support provided throughout assessment.        Legal Name: Anupama Salazar         :  2024  Address: [address] [place] [address]  Parent's Phone Numbers: [phone]  [phone]     Pediatrician: None Selected. [facility] list provided.    Education: Information given on NICU Education Classes; Physician/NNP daily rounds; and Postpartum Depression signs.   Potential Eligibility for SSI Benefits: Yes. Sw to provide diagnosis letter for application process.        Problem List       Patient Active Problem List   Diagnosis      infant with birth weight of 1,750 to 1,999 grams and 32 completed weeks of gestation    Alteration in nutrition in infant    Healthcare maintenance    Respiratory distress syndrome in                Birth Hospital:[facility]          DENNIS: [birth year]     Birth Weight:   1.73 kg (3 lb 13 oz)              Birth Length: 41.9                      Gestational Age: 32w4d           Apgars    Living status: Living  Apgar Component Scores:  1 min.:  5 min.:  10 min.:  15 min.:  20 min.:    Skin color:  0  1          Heart rate:  2  1          Reflex irritability:  1  2          Muscle tone:  1  2          Respiratory effort:  2  2          Total:  6  8          Apgars assigned by: NICU           24 1111   NICU Assessment   Assessment Type Discharge Planning Assessment   Source of Information family   Verified Demographic and Insurance Information Yes   Insurance Commercial   Commercial [employer]   Guarantor Father   Lives With mother;father;brother   Number people in home 7 including infant   Relationship Status of Parents    Other children (include names and ages) Chika Jr-2   Father's  Involvement Fully Involved   Is Father signing the birth certificate Yes   Father Name and  Chika Salazar-1995   Father's Address Same as mom   Father's Employer [employer]   Father's Job Title    Family Involvement High   Other Cont[facility] Names and Numbers [facility] [phone]   Infant Feeding Plan breastfeeding;formula feeding   Does baby have crib or safe sleep space? Yes   Do you have a car seat? Yes   Resource/Environmental Concerns none   Environment Concerns none   Resources/Education Provided [facility] Financial Services;[place] Insurance plan;Glossary of Commonly Used Terms;SSI Benefits;Preparing for Your Baby's Discharge Home;Support Resources for NICU Families;WIC;Early Intervention Program;Post Partum Depression;My Preemi Rhonda;My NICU Baby Rhonda;Parents as teachers   DME Needed Upon Discharge  none   DCFS No indications (Indicators for Report)   Discharge Plan A Home with family   Discharge Plan B Home   Do you have any problems affording any of your prescribed medications? No

## 2024-11-21 NOTE — PLAN OF CARE
Copied from baby's chart    SOCIAL WORK DISCHARGE PLANNING ASSESSMENT     SW completed discharge planning assessment with pt's mother at pt's bedside.  Pt's mother was easily engaged. Education on the role of  was provided. Emotional support provided throughout assessment.        Legal Name: Valery Salazar         :  2024  Address: [address] [place] [address]  Parent's Phone Numbers: [phone]  [phone]     Pediatrician: None Selected. [facility] list provided.    Education: Information given on NICU Education Classes; Physician/NNP daily rounds; and Postpartum Depression signs.   Potential Eligibility for SSI Benefits: Yes. Sw to provide diagnosis letter for application process.        Problem List       Patient Active Problem List   Diagnosis    Alteration in nutrition    Prematurity, birth weight 1,750-1,999 grams, with 32 completed weeks of gestation    Healthcare maintenance               Birth Hospital:[facility]           DENNIS: [birth year]     Birth Weight:   1.99 kg (4 lb 6.2 oz)              Birth Length: 44.3                      Gestational Age: 32w4d           Apgars    Living status: Living  Apgar Component Scores:  1 min.:  5 min.:  10 min.:  15 min.:  20 min.:    Skin color:  0  1          Heart rate:  2  2          Reflex irritability:  2  2          Muscle tone:  2  2          Respiratory effort:  2  2          Total:  8  9          Apgars assigned by: NICU           24 1132   NICU Assessment   Assessment Type Discharge Planning Assessment   Source of Information family   Verified Demographic and Insurance Information Yes   Insurance Commercial   Commercial [employer]   Guarantor Father   Lives With mother;father;brother   Number people in home 7 including infant   Relationship Status of Parents    Other children (include names and ages) Chika Díaz-2   Father's Involvement Fully Involved   Is Father signing the birth certificate  Yes   Father Name and  Chika Salazar Sr-10/20/95   Father's Address Same as mom   Father's Employer [employer]   Father's Job Title    Family Involvement High   Other Contacts Names and Numbers Juliana HunterV[facility]-[phone]   Infant Feeding Plan breastfeeding;formula feeding   Does baby have crib or safe sleep space? Yes   Do you have a car seat? Yes   Resource/Environmental Concerns none   Environment Concerns none   Resources/Education Provided [facility] Services;[place] Insurance plan;Glossary of Commonly Used Terms;SSI Benefits;Preparing for Your Baby's Discharge Home;Support Resources for NICU Families;WIC;Early Intervention Program;Post Partum Depression;My Preemi Rhonda;My NICU Baby Rhonda;Parents as teachers   DME Needed Upon Discharge  none   DCFS No indications (Indicators for Report)   Discharge Plan A Home with family   Discharge Plan B Home   Do you have any problems affording any of your prescribed medications? No

## 2024-11-21 NOTE — PLAN OF CARE
Copied from baby's chart    SOCIAL WORK DISCHARGE PLANNING ASSESSMENT     SW completed discharge planning assessment with pt's mother at pt's bedside.  Pt's mother was easily engaged. Education on the role of  was provided. Emotional support provided throughout assessment.        Legal Name: Neela Salazar         :  2024  Address: [address] [place] [address]  Parent's Phone Numbers: [phone]  [phone]     Pediatrician: None Selected. [facility] list provided.    Education: Information given on NICU Education Classes; Physician/NNP daily rounds; and Postpartum Depression signs.   Potential Eligibility for SSI Benefits: Yes. Sw to provide diagnosis letter for application process.        Problem List       Patient Active Problem List   Diagnosis    Prematurity, 1,750-1,999 grams, 31-32 completed weeks    Alteration in nutrition    Healthcare maintenance    Hypoglycemia,                Birth Hospital:[facility]           DENNIS: 25     Birth Weight:   1.83 kg (4 lb 0.6 oz)              Birth Length:                       Gestational Age: 32w4d           Apgars    Living status: Living  Apgar Component Scores:  1 min.:  5 min.:  10 min.:  15 min.:  20 min.:    Skin color:  0  1          Heart rate:  2  2          Reflex irritability:  2  2          Muscle tone:  2  2          Respiratory effort:  2  2          Total:  8  9          Apgars assigned by: NICU           24 1120   NICU Assessment   Assessment Type Discharge Planning Assessment   Source of Information family   Verified Demographic and Insurance Information Yes   Insurance Commercial   Commercial [employer]   Guarantor Father   Lives With mother;father;brother   Number people in home 7-including infant   Relationship Status of Parents    Other children (include names and ages) Chika Díaz-2   Father's Involvement Fully Involved   Is Father signing the birth certificate Yes    Father Name and  Chika Salazar Sr.-10/20/95   Father's Address Same as mom   Father's Employer [employer]   Father's Job Title    Family Involvement High   Other [facility] [phone]   Infant Feeding Plan breastfeeding;formula feeding   Does baby have crib or safe sleep space? Yes   Do you have a car seat? Yes   Resource/Environmental Concerns none   Environment Concerns none   Resources/Education Provided [facility] Financial Services;[place] Insurance plan;Glossary of Commonly Used Terms;SSI Benefits;Preparing for Your Baby's Discharge Home;Support Resources for NICU Families;WIC;Early Intervention Program;Post Partum Depression;My NICU Baby Rhonda;My Preemi Rhonda;Parents as teachers   DME Needed Upon Discharge  none   DCFS No indications (Indicators for Report)   Discharge Plan A Home   Discharge Plan B Home with family   Do you have any problems affording any of your prescribed medications? No

## 2024-11-21 NOTE — ANESTHESIA POSTPROCEDURE EVALUATION
Anesthesia Post Evaluation    Patient: Eve Salazar    Procedure(s) Performed: Procedure(s) (LRB):   SECTION (N/A)    Final Anesthesia Type: CSE      Patient location during evaluation: labor & delivery  Patient participation: Yes- Able to Participate  Level of consciousness: awake and alert and oriented  Post-procedure vital signs: reviewed and stable  Pain management: adequate  Airway patency: patent    PONV status at discharge: No PONV  Anesthetic complications: no      Cardiovascular status: blood pressure returned to baseline and hemodynamically stable  Respiratory status: unassisted, spontaneous ventilation and room air  Hydration status: euvolemic  Follow-up not needed.              Vitals Value Taken Time   /87 24   Temp 36.6 °C (97.9 °F) 24   Pulse 86 24   Resp 18 24   SpO2 99 % 24         Event Time   Out of Recovery 11:12:00         Pain/Tita Score: Pain Rating Prior to Med Admin: 5 (2024  8:39 AM)  Pain Rating Post Med Admin: 5 (2024  2:28 AM)

## 2024-11-21 NOTE — PROGRESS NOTES
Category 1 FHR tracings observed pre-op. Discussed plan for FHR monitoring in OR with Dr. Vasques. Will prep immediately after epidural placement. Dr. Vasques at bedside continuously in OR during epidural placement.

## 2024-11-21 NOTE — PLAN OF CARE
VSS. Uterus firm w/o massage, bleeding continues to improve. Incision w/stitches intact. Scant amount of drainage noted on hydrocolloid dressing. Bleeding controlled. Scott catheter removed @ 1930. Pt ambulating independently, voiding spontaneously, passing flatus. Pain managed adequately w/medication. Plan of care reviewed w/pt and spouse. No new concerns expressed at this time. Will continue to monitor and intervene when necessary.   Problem: Adult Inpatient Plan of Care  Goal: Plan of Care Review  Outcome: Progressing  Goal: Patient-Specific Goal (Individualized)  Outcome: Progressing  Goal: Absence of Hospital-Acquired Illness or Injury  Outcome: Progressing  Intervention: Identify and Manage Fall Risk  Flowsheets (Taken 11/21/2024 0458)  Safety Promotion/Fall Prevention:   assistive device/personal item within reach   Fall Risk reviewed with patient/family   family expresses understanding of fall risk and prevention   family to remain at bedside   lighting adjusted   medications reviewed   nonskid shoes/socks when out of bed   patient expresses understanding of fall risk and prevention  Intervention: Prevent Skin Injury  Flowsheets (Taken 11/21/2024 0458)  Body Position: weight shifting  Device Skin Pressure Protection: absorbent pad utilized/changed  Intervention: Prevent and Manage VTE (Venous Thromboembolism) Risk  Flowsheets (Taken 11/21/2024 0458)  VTE Prevention/Management:   ambulation promoted   fluids promoted  Goal: Optimal Comfort and Wellbeing  Outcome: Progressing  Intervention: Monitor Pain and Promote Comfort  Flowsheets (Taken 11/21/2024 0458)  Pain Management Interventions:   around-the-clock dosing utilized   care clustered   awakened for pain meds per patient request   medication offered   pain management plan reviewed with patient/caregiver   pillow support provided  Intervention: Provide Person-Centered Care  Flowsheets (Taken 11/21/2024 0458)  Trust Relationship/Rapport:   care explained    choices provided   emotional support provided   empathic listening provided   questions answered   questions encouraged   reassurance provided   thoughts/feelings acknowledged  Goal: Readiness for Transition of Care  Outcome: Progressing     Problem: Postpartum ( Delivery)  Goal: Successful Parent Role Transition  Outcome: Progressing  Intervention: Support Parent Role Transition  Flowsheets (Taken 2024)  Supportive Measures:   active listening utilized   counseling provided   decision-making supported   goal-setting facilitated   positive reinforcement provided   problem-solving facilitated   self-care encouraged   relaxation techniques promoted   self-reflection promoted   self-responsibility promoted   verbalization of feelings encouraged  Parent-Child Attachment Promotion:   parent/caregiver presence encouraged   interaction encouraged   strengths emphasized  Goal: Hemostasis  Outcome: Progressing  Goal: Effective Bowel Elimination  Outcome: Progressing  Intervention: Enhance Bowel Motility and Elimination  Flowsheets (Taken 2024)  Bowel Elimination Promotion:   adequate fluid intake promoted   ambulation promoted  Bowel Motility Enhancement:   ambulation promoted   fluid intake encouraged  Goal: Fluid and Electrolyte Balance  Outcome: Progressing  Intervention: Monitor and Manage Fluid and Electrolyte Balance  Flowsheets (Taken 2024)  Fluid/Electrolyte Management: fluids provided  Goal: Absence of Infection Signs and Symptoms  Outcome: Progressing  Goal: Optimal Pain Control and Function  Outcome: Progressing  Intervention: Prevent or Manage Pain  Flowsheets (Taken 2024)  Pain Management Interventions:   around-the-clock dosing utilized   care clustered   awakened for pain meds per patient request   medication offered   pain management plan reviewed with patient/caregiver   pillow support provided  Goal: Nausea and Vomiting Relief  Outcome:  Progressing  Intervention: Prevent or Manage Nausea and Vomiting  Flowsheets (Taken 11/21/2024 0458)  Nausea/Vomiting Interventions: stimuli minimized  Goal: Effective Urinary Elimination  Outcome: Progressing  Intervention: Monitor and Manage Urinary Retention  Flowsheets (Taken 11/21/2024 0458)  Urinary Elimination Promotion: frequent voiding encouraged  Goal: Effective Oxygenation and Ventilation  Outcome: Progressing     Problem: Hypertensive Disorders in Pregnancy  Goal: Patient-Fetal Stabilization  Outcome: Progressing  Intervention: Optimize Blood Pressure and Fluid Status  Flowsheets (Taken 11/21/2024 0458)  Fluid/Electrolyte Management: fluids provided  Fetal Wellbeing Promotion: intake and output monitored  Intervention: Monitor and Manage Symptom Progression  Flowsheets (Taken 11/21/2024 0458)  Medication Review/Management: medications reviewed     Problem: Parent-Child Attachment Altered  Goal: Attachment Behaviors Demonstrated  Outcome: Progressing  Intervention: Promote Attachment  Flowsheets (Taken 11/21/2024 0458)  Family/Support System Care:   caregiver stress acknowledged   involvement promoted   presence promoted   self-care encouraged   support provided  Parent-Child Attachment Promotion:   parent/caregiver presence encouraged   interaction encouraged   strengths emphasized     Problem: Breastfeeding  Goal: Effective Breastfeeding  Outcome: Progressing  Intervention: Promote Breast Care and Comfort  Flowsheets (Taken 11/21/2024 0458)  Breast Care: Breastfeeding:   open to air   warm shower encouraged  Breast Pumping: double electric breast pump utilized  Intervention: Promote Effective Breastfeeding  Flowsheets (Taken 11/21/2024 0458)  Breastfeeding Assistance: electric breast pump used  Parent-Child Attachment Promotion:   parent/caregiver presence encouraged   interaction encouraged   strengths emphasized  Intervention: Support Exclusive Breastfeeding Success  Flowsheets (Taken 11/21/2024  0458)  Supportive Measures:   active listening utilized   counseling provided   decision-making supported   goal-setting facilitated   positive reinforcement provided   problem-solving facilitated   self-care encouraged   relaxation techniques promoted   self-reflection promoted   self-responsibility promoted   verbalization of feelings encouraged  Breastfeeding Support:   maternal rest encouraged   maternal hydration promoted

## 2024-11-21 NOTE — LACTATION NOTE
This note was copied from a baby's chart.  Lactation Note:   Met mother at bedside; Introduced self. Mom verbalize plan/desire to breast feed/provide as much ebm as possible for their babies. Discussed the importance of frequent pumping in first two weeks to establish a full breast milk supply. Encouraged pumping 8 or more times in 24 hours and skin to skin care with bedside pumping as often as able. Discussed pumping every 2-3 hours with only one 5-hour break without pumping for sleep this first night-doing TWO 15min initiate cycles per session-30min pump total with hand expression to follow and/or in between these first several days as able. Recommended pumping schedule discussed. Pumping supplies brought to bedside by RN. Benefits of breast milk for baby and benefits of providing breast milk for mother discussed. Written pumping schedule/log provided along with handouts on Increasing supply for NICU babies and Storage/Handling CDC information. NICU lactation number provided and nicu margoth pump paperwork completed for 2 week loaner breast pump. Highly encouraged mom to consider hospital grade pump rental after loaner pump due and to not use wireless inefficient home breast pumps until her milk supply is fully established. Dad present and very supportive/helpful. Encouragement and support offered to mom.

## 2024-11-21 NOTE — PROGRESS NOTES
"POSTPARTUM PROGRESS NOTE    Subjective:     PPD/POD#: 1   Procedure: Primary LTCS (quadruplets)   EGA: 32w4d   N/V: No   F/C: No   Abd Pain: Mild, well-controlled with oral pain medication   Lochia: Mild   Voiding: Yes   Ambulating: Yes   Bowel fnc: No   Contraception: Per primary OB     Objective:      Temp:  [97.6 °F (36.4 °C)-98.9 °F (37.2 °C)] 97.7 °F (36.5 °C)  Pulse:  [] 78  Resp:  [18] 18  SpO2:  [97 %-100 %] 99 %  BP: (133-162)/(69-96) 133/80    Abdomen: Soft, appropriately tender   Uterus: Firm, no fundal tenderness   Incision: Bandage clean without shadowing     Lab Review    No results for input(s): "NA", "K", "CL", "CO2", "BUN", "CREATININE", "GLU", "PROT", "BILITOT", "ALKPHOS", "ALT", "AST", "MG", "PHOS" in the last 168 hours.    Recent Labs   Lab 11/20/24  0546 11/21/24  0530   WBC 10.91 12.41   HGB 9.0* 8.2*   HCT 29.4* 26.6*   MCV 81* 80*    163         I/O    Intake/Output Summary (Last 24 hours) at 11/21/2024 0712  Last data filed at 11/21/2024 0137  Gross per 24 hour   Intake 1201.93 ml   Output 4813 ml   Net -3611.07 ml        Assessment and Plan:   Postpartum Care  - Patient doing well.  - Continue routine management and advances.    PPH/ABLA  - H/H as above  - QBL: 913 mL  - asymptomatic  - iron/colace  - s/p buccal Cytotec    cHTN  - BP as above  - asymptomatic  - preE labs as above  - Mag: not indicated  - Hypertensive agent: none    Laura Dobbs MD  OB/GYN PGY-1  "

## 2024-11-22 LAB
BLD PROD TYP BPU: NORMAL
BLD PROD TYP BPU: NORMAL
BLOOD UNIT EXPIRATION DATE: NORMAL
BLOOD UNIT EXPIRATION DATE: NORMAL
BLOOD UNIT TYPE CODE: 5100
BLOOD UNIT TYPE CODE: 5100
BLOOD UNIT TYPE: NORMAL
BLOOD UNIT TYPE: NORMAL
CODING SYSTEM: NORMAL
CODING SYSTEM: NORMAL
CROSSMATCH INTERPRETATION: NORMAL
CROSSMATCH INTERPRETATION: NORMAL
DISPENSE STATUS: NORMAL
DISPENSE STATUS: NORMAL
FINAL PATHOLOGIC DIAGNOSIS: NORMAL
GROSS: NORMAL
Lab: NORMAL
NUM UNITS TRANS PACKED RBC: NORMAL
NUM UNITS TRANS PACKED RBC: NORMAL

## 2024-11-22 PROCEDURE — 11000001 HC ACUTE MED/SURG PRIVATE ROOM

## 2024-11-22 PROCEDURE — 25000003 PHARM REV CODE 250: Performed by: OBSTETRICS & GYNECOLOGY

## 2024-11-22 PROCEDURE — 25000003 PHARM REV CODE 250

## 2024-11-22 RX ADMIN — ACETAMINOPHEN 650 MG: 325 TABLET, FILM COATED ORAL at 08:11

## 2024-11-22 RX ADMIN — OXYCODONE HYDROCHLORIDE 5 MG: 5 TABLET ORAL at 01:11

## 2024-11-22 RX ADMIN — IBUPROFEN 800 MG: 400 TABLET ORAL at 01:11

## 2024-11-22 RX ADMIN — OXYCODONE HYDROCHLORIDE 5 MG: 5 TABLET ORAL at 05:11

## 2024-11-22 RX ADMIN — PRENATAL VIT W/ FE FUMARATE-FA TAB 27-0.8 MG 1 TABLET: 27-0.8 TAB at 08:11

## 2024-11-22 RX ADMIN — DOCUSATE SODIUM 200 MG: 100 CAPSULE, LIQUID FILLED ORAL at 08:11

## 2024-11-22 RX ADMIN — ACETAMINOPHEN 650 MG: 325 TABLET, FILM COATED ORAL at 01:11

## 2024-11-22 RX ADMIN — ACETAMINOPHEN 650 MG: 325 TABLET, FILM COATED ORAL at 05:11

## 2024-11-22 RX ADMIN — IBUPROFEN 800 MG: 400 TABLET ORAL at 06:11

## 2024-11-22 RX ADMIN — FERROUS SULFATE TAB 325 MG (65 MG ELEMENTAL FE) 1 EACH: 325 (65 FE) TAB at 08:11

## 2024-11-22 RX ADMIN — OXYCODONE HYDROCHLORIDE 5 MG: 5 TABLET ORAL at 06:11

## 2024-11-22 RX ADMIN — IBUPROFEN 800 MG: 400 TABLET ORAL at 09:11

## 2024-11-22 NOTE — LACTATION NOTE
11/22/24 0930   Maternal Assessment   Breast Shape Bilateral:;round   Breast Density Bilateral:;soft   Areola Bilateral:;elastic   Nipples Bilateral:;everted   Maternal Infant Feeding   Maternal Emotional State independent   Equipment Type   Breast Pump Type double electric, hospital grade   Breast Pump Flange Type hard   Breast Pump Flange Size 24 mm   Breast Pumping   Breast Pumping Interventions frequent pumping encouraged   Breast Pumping double electric breast pump utilized     Basic education on pumping for the NICU baby given. Questions answered. Support given.

## 2024-11-22 NOTE — PLAN OF CARE
POC reviewed with patient, verbalized understanding. Fundus firm and bleeding moderate. Pain well controlled with PO medications. Ambulating/voiding. Patient denies any s/s of pre-e. Patient visited with infants in NICU. Patient pumping for infants in NICU.

## 2024-11-22 NOTE — PROGRESS NOTES
POSTPARTUM PROGRESS NOTE    Subjective:     PPD/POD#: 2   Procedure: Primary LTCS (quadruplets)   EGA: 32w4d   N/V: No   F/C: No   Abd Pain: Mild, well-controlled with oral pain medication   Lochia: Mild   Voiding: Yes   Ambulating: Yes   Bowel fnc: Yes   Contraception: Per primary OB     Objective:      Temp:  [97.3 °F (36.3 °C)-98.3 °F (36.8 °C)] 97.8 °F (36.6 °C)  Pulse:  [85-99] 88  Resp:  [16-18] 16  SpO2:  [97 %-100 %] 97 %  BP: (127-147)/(74-87) 127/74    Abdomen: Soft, appropriately tender   Uterus: Firm, no fundal tenderness   Incision: Bandage clean without shadowing     Lab Review    Recent Labs   Lab 11/21/24  1905      K 4.4      CO2 24   BUN 12   CREATININE 0.7      PROT 5.9*   BILITOT 0.2   ALKPHOS 106   ALT 21   AST 32       Recent Labs   Lab 11/20/24  0546 11/21/24  0530   WBC 10.91 12.41   HGB 9.0* 8.2*   HCT 29.4* 26.6*   MCV 81* 80*    163         I/O    Intake/Output Summary (Last 24 hours) at 11/22/2024 0645  Last data filed at 11/22/2024 0025  Gross per 24 hour   Intake 900 ml   Output 500 ml   Net 400 ml        Assessment and Plan:   Postpartum Care  - Patient doing well.  - Continue routine management and advances.    PPH/ABLA  - H/H as above  - QBL: 913 mL  - asymptomatic  - iron/colace  - s/p buccal Cytotec    cHTN  - BP as above  - asymptomatic  - preE labs as above  - Mag: not indicated  - Hypertensive agent: not yet indicated       Radha Harrell MD  OBGYN, PGY-4

## 2024-11-22 NOTE — PLAN OF CARE
VSS. Uterus firm w/o massage, bleeding continues to improve. Incision w/stitches dry and intact. Pt ambulating independently, voiding spontaneously, passing flatus. Pain managed adequately w/medication. Plan of care reviewed w/pt and spouse. No new concerns expressed at this time. Will continue to monitor and intervene when necessary.     Problem: Adult Inpatient Plan of Care  Goal: Plan of Care Review  Outcome: Progressing  Flowsheets (Taken 11/22/2024 0343)  Plan of Care Reviewed With:   patient   spouse  Goal: Patient-Specific Goal (Individualized)  Outcome: Progressing  Flowsheets (Taken 11/22/2024 0343)  Individualized Care Needs: pain control and rest  Anxieties, Fears or Concerns: multiple babies  Patient/Family-Specific Goals (Include Timeframe): healthy mom and babies  Goal: Absence of Hospital-Acquired Illness or Injury  Outcome: Progressing  Intervention: Identify and Manage Fall Risk  Flowsheets (Taken 11/22/2024 0343)  Safety Promotion/Fall Prevention:   assistive device/personal item within reach   nonskid shoes/socks when out of bed   lighting adjusted   medications reviewed   family to remain at bedside  Intervention: Prevent and Manage VTE (Venous Thromboembolism) Risk  Flowsheets (Taken 11/22/2024 0343)  VTE Prevention/Management:   ambulation promoted   fluids promoted   bleeding risk assessed  Intervention: Prevent Infection  Flowsheets (Taken 11/22/2024 0343)  Infection Prevention:   hand hygiene promoted   rest/sleep promoted   equipment surfaces disinfected  Goal: Optimal Comfort and Wellbeing  Outcome: Progressing  Intervention: Monitor Pain and Promote Comfort  Flowsheets (Taken 11/22/2024 0343)  Pain Management Interventions:   around-the-clock dosing utilized   awakened for pain meds per patient request   care clustered   heat applied   pillow support provided   position adjusted   premedicated for activity  Intervention: Provide Person-Centered Care  Flowsheets (Taken 11/22/2024  343)  Trust Relationship/Rapport:   care explained   choices provided   emotional support provided   empathic listening provided   questions answered   questions encouraged   reassurance provided   thoughts/feelings acknowledged  Goal: Readiness for Transition of Care  Outcome: Progressing     Problem: Postpartum ( Delivery)  Goal: Successful Parent Role Transition  Outcome: Progressing  Intervention: Support Parent Role Transition  Flowsheets (Taken 2024)  Supportive Measures:   active listening utilized   counseling provided   decision-making supported   positive reinforcement provided   goal-setting facilitated   relaxation techniques promoted   self-care encouraged   self-reflection promoted   self-responsibility promoted   verbalization of feelings encouraged  Parent-Child Attachment Promotion:   interaction encouraged   parent/caregiver presence encouraged   strengths emphasized   participation in care promoted  Goal: Hemostasis  Outcome: Progressing  Goal: Effective Bowel Elimination  Outcome: Progressing  Intervention: Enhance Bowel Motility and Elimination  Flowsheets (Taken 2024)  Bowel Elimination Promotion:   adequate fluid intake promoted   ambulation promoted  Bowel Motility Enhancement:   ambulation promoted   fluid intake encouraged  Goal: Fluid and Electrolyte Balance  Outcome: Progressing  Intervention: Monitor and Manage Fluid and Electrolyte Balance  Flowsheets (Taken 2024)  Fluid/Electrolyte Management: fluids provided  Goal: Absence of Infection Signs and Symptoms  Outcome: Progressing  Intervention: Prevent or Manage Infection  Flowsheets (Taken 2024)  Fever Reduction/Comfort Measures:   fluid intake increased   lightweight clothing   lightweight bedding  Goal: Optimal Pain Control and Function  Outcome: Progressing  Intervention: Prevent or Manage Pain  Flowsheets (Taken 2024)  Pain Management Interventions:   around-the-clock  dosing utilized   awakened for pain meds per patient request   care clustered   heat applied   pillow support provided   position adjusted   premedicated for activity  Perineal Care:   perineal hygiene encouraged   perineal spray bottle/warm water use encouraged   perineum cleansed     Problem: Hypertensive Disorders in Pregnancy  Goal: Patient-Fetal Stabilization  Outcome: Progressing  Intervention: Optimize Blood Pressure and Fluid Status  Flowsheets (Taken 11/22/2024 0343)  Fluid/Electrolyte Management: fluids provided     Problem: Parent-Child Attachment Altered  Goal: Attachment Behaviors Demonstrated  Outcome: Progressing  Intervention: Promote Attachment  Flowsheets (Taken 11/22/2024 0343)  Family/Support System Care:   caregiver stress acknowledged   involvement promoted   presence promoted   self-care encouraged   support provided  Parent-Child Attachment Promotion:   interaction encouraged   parent/caregiver presence encouraged   strengths emphasized   participation in care promoted     Problem: Breastfeeding  Goal: Effective Breastfeeding  Outcome: Progressing  Intervention: Promote Breast Care and Comfort  Flowsheets (Taken 11/22/2024 0343)  Breast Care: Breastfeeding:   lanolin to nipples   supportive bra utilized   warm compress applied  Breast Pumping: double electric breast pump utilized  Intervention: Promote Effective Breastfeeding  Flowsheets (Taken 11/22/2024 0343)  Breastfeeding Assistance:   support offered   electric breast pump used  Parent-Child Attachment Promotion:   interaction encouraged   parent/caregiver presence encouraged   strengths emphasized   participation in care promoted  Intervention: Support Exclusive Breastfeeding Success  Flowsheets (Taken 11/22/2024 0343)  Supportive Measures:   active listening utilized   counseling provided   decision-making supported   positive reinforcement provided   goal-setting facilitated   relaxation techniques promoted   self-care encouraged    self-reflection promoted   self-responsibility promoted   verbalization of feelings encouraged  Breastfeeding Support:   maternal hydration promoted   maternal nutrition promoted   maternal rest encouraged

## 2024-11-22 NOTE — PLAN OF CARE
VSS. Pain controlled with oral and IV pain medication. Fundus firm and midline with moderate/light lochia rubra. LTV dressing in place, dry/intact with marked drainage. voiding spontaneously with adequate output. Passing gas. No concerns at this time.

## 2024-11-23 PROCEDURE — 99232 SBSQ HOSP IP/OBS MODERATE 35: CPT | Mod: ,,, | Performed by: OBSTETRICS & GYNECOLOGY

## 2024-11-23 PROCEDURE — 11000001 HC ACUTE MED/SURG PRIVATE ROOM

## 2024-11-23 PROCEDURE — 25000003 PHARM REV CODE 250: Performed by: OBSTETRICS & GYNECOLOGY

## 2024-11-23 PROCEDURE — 25000003 PHARM REV CODE 250

## 2024-11-23 RX ADMIN — IBUPROFEN 800 MG: 400 TABLET ORAL at 11:11

## 2024-11-23 RX ADMIN — ACETAMINOPHEN 650 MG: 325 TABLET, FILM COATED ORAL at 07:11

## 2024-11-23 RX ADMIN — DOCUSATE SODIUM 200 MG: 100 CAPSULE, LIQUID FILLED ORAL at 11:11

## 2024-11-23 RX ADMIN — DOCUSATE SODIUM 200 MG: 100 CAPSULE, LIQUID FILLED ORAL at 12:11

## 2024-11-23 RX ADMIN — IBUPROFEN 800 MG: 400 TABLET ORAL at 05:11

## 2024-11-23 RX ADMIN — ACETAMINOPHEN 650 MG: 325 TABLET, FILM COATED ORAL at 12:11

## 2024-11-23 RX ADMIN — DOCUSATE SODIUM 200 MG: 100 CAPSULE, LIQUID FILLED ORAL at 01:11

## 2024-11-23 RX ADMIN — ACETAMINOPHEN 650 MG: 325 TABLET, FILM COATED ORAL at 01:11

## 2024-11-23 RX ADMIN — PRENATAL VIT W/ FE FUMARATE-FA TAB 27-0.8 MG 1 TABLET: 27-0.8 TAB at 12:11

## 2024-11-23 RX ADMIN — FERROUS SULFATE TAB 325 MG (65 MG ELEMENTAL FE) 1 EACH: 325 (65 FE) TAB at 12:11

## 2024-11-23 RX ADMIN — OXYCODONE HYDROCHLORIDE 5 MG: 5 TABLET ORAL at 05:11

## 2024-11-23 RX ADMIN — IBUPROFEN 800 MG: 400 TABLET ORAL at 12:11

## 2024-11-23 RX ADMIN — OXYCODONE HYDROCHLORIDE 5 MG: 5 TABLET ORAL at 01:11

## 2024-11-23 NOTE — LACTATION NOTE
Discharge lactation education provided on pumping for the NICU baby.supplies given. Questions answered.  Pt has lactation contact number.

## 2024-11-23 NOTE — PROGRESS NOTES
POSTPARTUM PROGRESS NOTE    Subjective:     PPD/POD#: 3   Procedure: Primary LTCS (quadruplets)   EGA: 32w4d   N/V: No   F/C: No   Abd Pain: Mild, well-controlled with oral pain medication   Lochia: Mild   Voiding: Yes   Ambulating: Yes   Bowel fnc: Yes   Contraception: Per primary OB   Patient denies headache, dizziness, visual changes, chest pain, shortness of breath, nausea or vomiting and right upper quadrant pain     Objective:      Temp:  [98 °F (36.7 °C)-98.4 °F (36.9 °C)] 98 °F (36.7 °C)  Pulse:  [] 101  Resp:  [18-19] 18  SpO2:  [97 %-100 %] 97 %  BP: (132-147)/(74-83) 132/83    Abdomen: Soft, appropriately tender   Uterus: Firm, no fundal tenderness   Incision: Bandage clean without shadowing     Lab Review    Recent Labs   Lab 24  1905      K 4.4      CO2 24   BUN 12   CREATININE 0.7      PROT 5.9*   BILITOT 0.2   ALKPHOS 106   ALT 21   AST 32       Recent Labs   Lab 24  0546 24  0530   WBC 10.91 12.41   HGB 9.0* 8.2*   HCT 29.4* 26.6*   MCV 81* 80*    163         I/O  No intake or output data in the 24 hours ending 24 0938       Assessment and Plan:   30 yo  POD#3 s/p RLTCS 2/2 quad gestation c/b PPH with acute on chronic anemia, and cHTN    Postpartum Care  - Patient doing well and meeting appropriate milestones  - Ambulating, voiding, tolerating PO, passing flatus  - Babies in NICU, doing well per pt, one having episodes of spit up but overall no concerns  - Continue routine management and advances.    2.   PPH with Acute on chronic anemia  - H/H as above  - QBL: 1145mL  - asymptomatic  - iron/colace  - s/p buccal Cytotec    3.   cHTN  - BP as above  - asymptomatic  - preE labs as above  - Mag: not indicated  - Hypertensive agent: not yet indicated     Darlyn Acuna MD  Obstetrics & Gynecology, PGY-1    Attending Attestation  I agree with the above edited resident note. Pt seen and examined, chart and labs reviewed.    Briefly, 30 yo   POD#3 s/p RLTCS for quadruplet gestation. Pt doing well postop and meeting appropriate milestones. Babies in NICU x 4, doing well per pt. Delivery c/b PPH with acute on chronic anemia. Pt remains asymptomatic and is on iron and colace, transfusion not indicated. Pregnancy c/b cHTN, unmedicated. Pt is asymptomatic for S/S of PreE and BP has not met criteria for initiation of antihypertensives.     Given cHTN with mild range BP and large fluid shifts from quadruplet gestation which may impact BP, as well as babies in NICU, recommended pt remain for additional day of observation. Pt is amenable to discharge tomorrow.     All questions answered. Cont current plan of care as above.     Estephanie Pena MD  OB Hospitalist  2024

## 2024-11-23 NOTE — PLAN OF CARE
VSS. Uterus firm w/o massage, bleeding continues to improve. Incision w/stitches dry and intact. Pt ambulating independently, voiding spontaneously, passing flatus. Pt is happy she was able to spend plenty of time with infants in NICU. Pain managed adequately w/medication. Plan of care reviewed w/pt and spouse. No new concerns expressed at this time. Will continue to monitor and intervene when necessary.     Declined

## 2024-11-24 VITALS
HEART RATE: 80 BPM | DIASTOLIC BLOOD PRESSURE: 75 MMHG | RESPIRATION RATE: 20 BRPM | BODY MASS INDEX: 38.09 KG/M2 | TEMPERATURE: 98 F | SYSTOLIC BLOOD PRESSURE: 132 MMHG | OXYGEN SATURATION: 99 % | WEIGHT: 237 LBS | HEIGHT: 66 IN

## 2024-11-24 PROCEDURE — 25000003 PHARM REV CODE 250

## 2024-11-24 PROCEDURE — 25000003 PHARM REV CODE 250: Performed by: OBSTETRICS & GYNECOLOGY

## 2024-11-24 PROCEDURE — 99024 POSTOP FOLLOW-UP VISIT: CPT | Mod: ,,, | Performed by: OBSTETRICS & GYNECOLOGY

## 2024-11-24 RX ORDER — OXYCODONE HYDROCHLORIDE 5 MG/1
5 TABLET ORAL EVERY 4 HOURS PRN
Qty: 20 TABLET | Refills: 0 | Status: SHIPPED | OUTPATIENT
Start: 2024-11-24

## 2024-11-24 RX ORDER — ACETAMINOPHEN 325 MG/1
650 TABLET ORAL EVERY 6 HOURS
Qty: 60 TABLET | Refills: 1 | Status: SHIPPED | OUTPATIENT
Start: 2024-11-24

## 2024-11-24 RX ORDER — FERROUS SULFATE 325(65) MG
325 TABLET, DELAYED RELEASE (ENTERIC COATED) ORAL DAILY
Qty: 30 TABLET | Refills: 1 | Status: SHIPPED | OUTPATIENT
Start: 2024-11-24

## 2024-11-24 RX ORDER — DOCUSATE SODIUM 100 MG/1
200 CAPSULE, LIQUID FILLED ORAL 2 TIMES DAILY
Qty: 60 CAPSULE | Refills: 1 | Status: SHIPPED | OUTPATIENT
Start: 2024-11-24

## 2024-11-24 RX ORDER — IBUPROFEN 600 MG/1
600 TABLET ORAL EVERY 6 HOURS
Qty: 30 TABLET | Refills: 1 | Status: SHIPPED | OUTPATIENT
Start: 2024-11-24

## 2024-11-24 RX ADMIN — OXYCODONE HYDROCHLORIDE 10 MG: 10 TABLET ORAL at 04:11

## 2024-11-24 RX ADMIN — PRENATAL VIT W/ FE FUMARATE-FA TAB 27-0.8 MG 1 TABLET: 27-0.8 TAB at 09:11

## 2024-11-24 RX ADMIN — FERROUS SULFATE TAB 325 MG (65 MG ELEMENTAL FE) 1 EACH: 325 (65 FE) TAB at 09:11

## 2024-11-24 RX ADMIN — IBUPROFEN 800 MG: 400 TABLET ORAL at 09:11

## 2024-11-24 RX ADMIN — DOCUSATE SODIUM 200 MG: 100 CAPSULE, LIQUID FILLED ORAL at 09:11

## 2024-11-24 RX ADMIN — ACETAMINOPHEN 650 MG: 325 TABLET, FILM COATED ORAL at 09:11

## 2024-11-24 RX ADMIN — ACETAMINOPHEN 650 MG: 325 TABLET, FILM COATED ORAL at 04:11

## 2024-11-24 RX ADMIN — ACETAMINOPHEN 650 MG: 325 TABLET, FILM COATED ORAL at 03:11

## 2024-11-24 NOTE — PROGRESS NOTES
POSTPARTUM PROGRESS NOTE    Subjective:     PPD/POD#: 4   Procedure: Primary LTCS (quadruplets)   EGA: 32w4d   N/V: No   F/C: No   Abd Pain: Mild, well-controlled with oral pain medication   Lochia: Mild   Voiding: Yes   Ambulating: Yes   Bowel fnc: Yes   Contraception: Per primary OB     Patient is doing well this AM. She denies headache, dizziness, visual changes, chest pain, shortness of breath, nausea or vomiting and right upper quadrant pain     Objective:      Temp:  [97.8 °F (36.6 °C)-99 °F (37.2 °C)] 98.4 °F (36.9 °C)  Pulse:  [] 77  Resp:  [18] 18  SpO2:  [96 %-100 %] 98 %  BP: (131-145)/(74-88) 140/74    Abdomen: Soft, appropriately tender   Uterus: Firm, no fundal tenderness   Incision: Bandage clean without shadowing     Lab Review    Recent Labs   Lab 24  1905      K 4.4      CO2 24   BUN 12   CREATININE 0.7      PROT 5.9*   BILITOT 0.2   ALKPHOS 106   ALT 21   AST 32       Recent Labs   Lab 24  0546 24  0530   WBC 10.91 12.41   HGB 9.0* 8.2*   HCT 29.4* 26.6*   MCV 81* 80*    163       I/O    Intake/Output Summary (Last 24 hours) at 2024 0701  Last data filed at 2024 2335  Gross per 24 hour   Intake 240 ml   Output --   Net 240 ml          Assessment and Plan:   28 yo  POD#4 s/p RLTCS 2/2 quad gestation c/b PPH with acute on chronic anemia, and cHTN    Postpartum Care  - Patient doing well and meeting appropriate milestones  - Ambulating, voiding, tolerating PO, passing flatus  - Babies in NICU, doing well per pt, one having episodes of spit up but overall no concerns  - Continue routine management and advances.  - Plan for DC home this AM    2.   PPH with Acute on chronic anemia  - H/H as above  - QBL: 1145mL  - asymptomatic  - iron/colace  - s/p buccal Cytotec    3.   cHTN  - BP as above  - asymptomatic  - preE labs as above  - Mag: not indicated  - Hypertensive agent: not yet indicated     Koki Tariq MD  Obstetrics and Gynecology  - PGY2

## 2024-11-24 NOTE — PLAN OF CARE
Overnight, AVSS. Ambulating and voiding independently. Pain well controlled with scheduled meds. Denies headache. Dressing over incision site clean, dry, and intact with small drainage. Pumping successfully. Visits babies in NICU. Safety maintained.

## 2024-11-24 NOTE — PLAN OF CARE
Pt pain controlled with oral medications. Tolerating regular diet. Voiding spontaneously without difficulty and passing flatus. Fundus firm; midline; 3 cm below umbilicus. Lochia scant, S/S of post partum hemorrhage and preeclampsia reviewed pt verbalized understanding. Pumping for babies in NICU.  at bedside. VSS.    Problem: Parent-Child Attachment Altered  Goal: Attachment Behaviors Demonstrated  Outcome: Progressing  Intervention: Promote Attachment  Flowsheets (Taken 11/24/2024 4302)  Family/Support System Care:   caregiver stress acknowledged   involvement promoted   support provided   self-care encouraged   presence promoted  Parent-Child Attachment Promotion:   strengths emphasized   positive reinforcement provided

## 2024-11-24 NOTE — DISCHARGE SUMMARY
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 now , POD #4 who was admitted on 2024 at 32w4d for planned . IUP complicated by cHTN, HSV, quadrachorionic quadraamniotic pregnancy, anemia. Patient was subsequently admitted to labor and delivery unit with signed consents.     Planned  section was performed without complications. Please see delivery note for further details.     Her postpartum course was complicated by a postpartum hemorrhage (QBL 1145 mL). She was started on iron for acute on chronic anemia. On discharge day, patient's pain is controlled with oral pain medications. Pt is tolerating ambulation without SOB or CP, and regular diet without N/V. Reports lochia is mild. Denies any HA, vision changes, F/C, LE swelling. Denies any breast pain/soreness.    Pt in stable condition and ready for discharge. She has been instructed to start and/or continue medications and follow up with her obstetrics provider as listed below.    Pertinent studies:  CBC  Recent Labs   Lab 24  0546 24  0530   WBC 10.91 12.41   HGB 9.0* 8.2*   HCT 29.4* 26.6*   MCV 81* 80*    163      Immunization History   Administered Date(s) Administered    Influenza - Quadrivalent - PF  Patient off unit at 1954 via hospital bed escorted by RN and CCT. Patient report given to Michelle, RN, all questions answered at this time. Patient updated on POC, all questions answered at this time. Patient declined call to family to update on transfer. Patient personal belongings, meds, and chart given to receiving RN. Charge RN updated.    *Preferred* (6 months and older) 2022    Tdap 02/10/2022, 10/16/2024           DEEPALI Salazar [[id number]]     Delivery:    Episiotomy:     Lacerations:     Repair suture:     Repair # of packets:     Blood loss (ml):       Birth information:  YOB: 2024   Time of birth: 7:36 AM   Sex: female   Delivery type: , Low Transverse   Gestational Age: 32w4d     Measurements    Weight: 1730 g  Weight (lbs): 3 lb 13 oz  Length:          Delivery Clinician: Delivery Providers    Delivering clinician: Laura Vasques MD   Provider Role    Madie Dougherty MD Korndorffer, Amy, Perla Topete Paige N, RN King, Bailey, CHRISTINA Iyer, Mackenzie PALUMBO RN              Additional  information:  Forceps:    Vacuum:    Breech:    Observed anomalies      Living?:     Apgars    Living status: Living  Apgar Component Scores:  1 min.:  5 min.:  10 min.:  15 min.:  20 min.:    Skin color:  0  1       Heart rate:  2  1       Reflex irritability:  1  2       Muscle tone:  1  2       Respiratory effort:  2  2       Total:  6  8       Apgars assigned by: NICU         Placenta: Delivered:       appearance     TRISH Salazar [[id number]]     Delivery:    Episiotomy:     Lacerations:     Repair suture:     Repair # of packets:     Blood loss (ml):       Birth information:  YOB: 2024   Time of birth: 7:38 AM   Sex: female   Delivery type: , Low Transverse   Gestational Age: 32w4d     Measurements    Weight: 1830 g  Weight (lbs): 4 lb 0.6 oz  Length:          Delivery Clinician: Delivery Providers    Delivering clinician: Laura Vasques MD   Provider Role    Madie Dougherty MD Korndorffer, Amy, Perla Topete Paige N, RN King, Bailey, CHRISTIAN Iyer, Mackenzie PALUMBO RN              Additional  information:  Forceps:    Vacuum:    Breech:    Observed anomalies      Living?:     Apgars    Living status:  Living  Apgar Component Scores:  1 min.:  5 min.:  10 min.:  15 min.:  20 min.:    Skin color:  0  1       Heart rate:  2  2       Reflex irritability:  2  2       Muscle tone:  2  2       Respiratory effort:  2  2       Total:  8  9       Apgars assigned by: NICU         Placenta: Delivered:       DESHAWN Weeks [[id number]]     Delivery:    Episiotomy:     Lacerations:     Repair suture:     Repair # of packets:     Blood loss (ml):       Birth information:  YOB: 2024   Time of birth: 7:40 AM   Sex: female   Delivery type: , Low Transverse   Gestational Age: 32w4d     Measurements    Weight: 1930 g  Weight (lbs): 4 lb 4.1 oz  Length:          Delivery Clinician: Delivery Providers    Delivering clinician: Laura Vasques MD   Provider Role    Madie Dougherty MD Korndorffer, Amy, Perla Topete Paige N, RN King, Bailey, RN Pettingill, Dawn A, RN              Additional  information:  Forceps:    Vacuum:    Breech:    Observed anomalies      Living?:     Apgars    Living status: Living  Apgar Component Scores:  1 min.:  5 min.:  10 min.:  15 min.:  20 min.:    Skin color:  0  1       Heart rate:  2  2       Reflex irritability:  2  2       Muscle tone:  2  2       Respiratory effort:  2  2       Total:  8  9       Apgars assigned by: NICU         Placenta: Delivered:       DORIS Weeks [[id number]]     Delivery:    Episiotomy:     Lacerations:     Repair suture:     Repair # of packets:     Blood loss (ml):       Birth information:  YOB: 2024   Time of birth: 7:42 AM   Sex: female   Delivery type: , Low Transverse   Gestational Age: 32w4d     Measurements    Weight: 1990 g  Weight (lbs): 4 lb 6.2 oz  Length:          Delivery Clinician: Delivery Providers    Delivering clinician: Laura Vasques MD   Provider Role    Madie Dougherty MD Korndorffer, Amy, CHRISTIAN      Perla Gupta Paige N, Anne Boateng RN Pettingill, Dawn A, RN              Additional  information:  Forceps:    Vacuum:    Breech:    Observed anomalies      Living?:     Apgars    Living status: Living  Apgar Component Scores:  1 min.:  5 min.:  10 min.:  15 min.:  20 min.:    Skin color:  0  1       Heart rate:  2  2       Reflex irritability:  2  2       Muscle tone:  2  2       Respiratory effort:  2  2       Total:  8  9       Apgars assigned by: NICU         Placenta: Delivered:       appearance    Patient Instructions:   Current Discharge Medication List        START taking these medications    Details   acetaminophen (TYLENOL) 325 MG tablet Take 2 tablets (650 mg total) by mouth every 6 (six) hours. Alternate with ibuprofen every 3 hours as needed.  Qty: 60 tablet, Refills: 1      docusate sodium (COLACE) 100 MG capsule Take 2 capsules (200 mg total) by mouth 2 (two) times daily.  Qty: 60 capsule, Refills: 1      ferrous sulfate 325 (65 FE) MG EC tablet Take 1 tablet (325 mg total) by mouth once daily.  Qty: 30 tablet, Refills: 1      ibuprofen (ADVIL,MOTRIN) 600 MG tablet Take 1 tablet (600 mg total) by mouth every 6 (six) hours. Alternate with acetaminophen every 3 hours as needed.  Qty: 30 tablet, Refills: 1      oxyCODONE (ROXICODONE) 5 MG immediate release tablet Take 1 tablet (5 mg total) by mouth every 4 (four) hours as needed for Pain.  Qty: 20 tablet, Refills: 0    Comments: Quantity prescribed more than 7 day supply? No  Associated Diagnoses: Status post repeat low transverse  section           CONTINUE these medications which have NOT CHANGED    Details   ascorbic acid, vitamin C, (VITAMIN C) 100 MG tablet Take 100 mg by mouth once daily.      famotidine (PEPCID) 40 MG tablet Take 1 tablet (40 mg total) by mouth once daily.  Qty: 90 tablet, Refills: 3      prenatal no115/iron/folic acid (PRENATAL 19 ORAL) Take 1 tablet by mouth Daily.      valACYclovir (VALTREX)  500 MG tablet TAKE 1 TABLET BY MOUTH TWICE A DAY  Qty: 180 tablet, Refills: 1    Associated Diagnoses: HSV (herpes simplex virus) anogenital infection           STOP taking these medications       aspirin (ECOTRIN) 81 MG EC tablet Comments:   Reason for Stopping:         hydrocortisone (ANUSOL-HC) 25 mg suppository Comments:   Reason for Stopping:         ondansetron (ZOFRAN) 4 MG tablet Comments:   Reason for Stopping:         senna-docusate 8.6-50 mg (PERICOLACE) 8.6-50 mg per tablet Comments:   Reason for Stopping:               Discharge Procedure Orders   Diet Adult Regular     Lifting restrictions   Order Comments: No lifting more than 15 pounds for 6 weeks     No driving until:   Order Comments: - Not taking narcotic pain medications  - You feel that you can safely slam on the brakes     Pelvic Rest   Order Comments: Nothing in vagina until evaluation at postpartum visit (no sex, tampons, or douching)     Notify your health care provider if you experience any of the following:  temperature >100.4     Notify your health care provider if you experience any of the following:  persistent nausea and vomiting or diarrhea     Notify your health care provider if you experience any of the following:  severe uncontrolled pain     Notify your health care provider if you experience any of the following:  redness, tenderness, or signs of infection (pain, swelling, redness, odor or green/yellow discharge around incision site)     Notify your health care provider if you experience any of the following:  difficulty breathing or increased cough     Notify your health care provider if you experience any of the following:  severe persistent headache     Notify your health care provider if you experience any of the following:  worsening rash     Notify your health care provider if you experience any of the following:  persistent dizziness, light-headedness, or visual disturbances     Notify your health care provider if you  experience any of the following:  increased confusion or weakness     Notify your health care provider if you experience any of the following:   Order Comments: - Heavy vaginal bleeding soaking through more than 2 pads/hour for > 2 hours  - Severe abdominal pain  - Drainage from your incision  - Headache not relieved with Tylenol  - Vision changes  - Chest pain or shortness or breath     Activity as tolerated     Shower on day dressing removed (No bath)   Order Comments: Do not submerge your incision in a bathtub until evaluation at postpartum visit        Follow-up Information       Laura Vasques MD Follow up on 1/3/2025.    Specialty: Obstetrics and Gynecology  Why: Postpartum appointment  Contact information:  [address]  [address]  [facility] [address]  [phone]               Laura Vasques MD Follow up in 1 week(s).    Specialty: Obstetrics and Gynecology  Why: BP check - can send BP over [employer] [facility]  Contact information:  [address]  [address]  [facility] [address]  [phone]                              Laura Dobbs MD  OB/GYN PGY-1

## 2024-11-25 ENCOUNTER — PATIENT MESSAGE (OUTPATIENT)
Dept: OBSTETRICS AND GYNECOLOGY | Facility: OTHER | Age: 29
End: 2024-11-25
Payer: OTHER GOVERNMENT

## 2024-11-25 ENCOUNTER — LACTATION ENCOUNTER (OUTPATIENT)
Dept: INTENSIVE CARE | Facility: OTHER | Age: 29
End: 2024-11-25

## 2024-11-25 NOTE — LACTATION NOTE
"This note was copied from a baby's chart.  Brief bedside contact with parents:  Dad holding two girls and mom about to pump at bedside. She reports realistically pumping~5x/24hrs (difficult with child at home and trying to hold/spend time with all 4 babies here). She does desire as much ebm as possible for the babies. LC praised mom, while also reiterating the importance of Frequent emptying and stimulating of her breasts in these first few weeks while supply is being "programmed" for full/long-term supply needs. We reviewed a brief physiology of ebm production/maintenance/supply. LC encouraged mom to set alarms and pump as often as able-trying to push to at least 8 pumps/24hrs even if not evenly spaced with no long stretches. LC also encouraged mom to pump a full 30min in to larger bottles, not to stop pumping when bottles fill-keep pumping full 30min this next 10 or so days to call in as much milk as possible. Mom verbalized understanding. RN reports Neela mary today-mom effectively breast fed mostly independently with a milk transfer of ~10ml per lactation scale-praised mom. Scheduled latch with Rosamaria tomorrow@1100 also. Encouragement/support provided.   "

## 2024-11-26 ENCOUNTER — LACTATION ENCOUNTER (OUTPATIENT)
Dept: INTENSIVE CARE | Facility: OTHER | Age: 29
End: 2024-11-26

## 2024-11-26 NOTE — LACTATION NOTE
This note was copied from a baby's chart.  Mother pumping 5 x/day 8 oz this am pump and 6 oz with last pumping session. Mother pumping 30 min. Mother has personal breast pump.    ESCOBAR AstudilloN, RNC, IBCLC

## 2024-11-26 NOTE — LACTATION NOTE
This note was copied from a baby's chart.  Mother/Baby being followed by lactation.  LC assisted with breastfeeding session. Neela eager to latch and alert for feeding. Infant rooted to breast and opened mouth widely. Latched on/off breast repeatedly with several good bursts of suckling. Minimally effective latch due to inability to maintain latch. Encouraged practice latching with cues. Praise and ongoing lactation support offered,  Bridgette Starr, BSN, RNC, IBCLC

## 2024-12-02 ENCOUNTER — LACTATION ENCOUNTER (OUTPATIENT)
Dept: INTENSIVE CARE | Facility: OTHER | Age: 29
End: 2024-12-02

## 2024-12-02 NOTE — LACTATION NOTE
This note was copied from a baby's chart.  DANIA assisted with breastfeeding session with Anupama first the Neela.   1400: Anupama latched and suckled with good rhythm, deep jaw drops and audible swallows x 15 min. Transferred 28 ml at breast!   1430: Neela latched and suckled x 15 min also with good rhythm and audible swallows. Transferred 22 ml!  Encouraged pumping post breastfeeding and continued practice latching with cues. Mother now pumping 6 x/day 5-6 oz/pump.   Praise and ongoing lactation support offered,  Bridgette Starr, BSN, RNC, IBCLC

## 2024-12-04 ENCOUNTER — POSTPARTUM VISIT (OUTPATIENT)
Dept: OBSTETRICS AND GYNECOLOGY | Facility: CLINIC | Age: 29
End: 2024-12-04
Payer: OTHER GOVERNMENT

## 2024-12-04 VITALS
WEIGHT: 205.69 LBS | DIASTOLIC BLOOD PRESSURE: 72 MMHG | BODY MASS INDEX: 33.06 KG/M2 | SYSTOLIC BLOOD PRESSURE: 114 MMHG | HEIGHT: 66 IN

## 2024-12-04 PROCEDURE — 99999 PR PBB SHADOW E&M-EST. PATIENT-LVL III: CPT | Mod: PBBFAC,,, | Performed by: OBSTETRICS & GYNECOLOGY

## 2024-12-04 PROCEDURE — 99024 POSTOP FOLLOW-UP VISIT: CPT | Mod: S$GLB,,, | Performed by: OBSTETRICS & GYNECOLOGY

## 2024-12-04 NOTE — PROGRESS NOTES
"Subjective:     Eve Salazar is a 29 y.o.  who presents for a two week post-operative follow up after a  delivery secondary to repeat c/s for quadruplets. Today she denies any issues. Pain has been well controlled. Denies redness or drainage from the incision. Exclusively Pumping. Babies are all doing well in the NICU. Denies symptoms of postpartum depression. Overall doing well.      Review the Delivery Report for details.     Objective:     Vitals: /72   Ht 5' 6" (1.676 m)   Wt 93.3 kg (205 lb 11 oz)   LMP 2023 (Exact Date)   Breastfeeding Yes   BMI 33.20 kg/m²     General:  Alert and cooperative   Abdomen: Abdomen is soft, non distended, non-tender.    Incision: Clean, healing well. No erythema or drainage.        Assessment:     1. S/p  delivery - 2 weeks post-op   2. Doing very well. Initial BP elevated but repeat check WNL.     Plan:     1. Continue daily wound care.  2. Pelvic rest for 6 weeks postpartum.    3. Gradually resume normal activities.  4. Return to clinic in 4 weeks for final post partum follow up.    "

## 2024-12-05 ENCOUNTER — TELEPHONE (OUTPATIENT)
Dept: MATERNAL FETAL MEDICINE | Facility: CLINIC | Age: 29
End: 2024-12-05
Payer: OTHER GOVERNMENT

## 2024-12-05 NOTE — TELEPHONE ENCOUNTER
Thursday, December 5, 2024    Joanna, the  at [facility] contacted the office to provide her contact information, in case it is needed in the future for this patient.

## 2024-12-07 ENCOUNTER — LACTATION ENCOUNTER (OUTPATIENT)
Dept: INTENSIVE CARE | Facility: OTHER | Age: 29
End: 2024-12-07

## 2024-12-07 NOTE — LACTATION NOTE
This note was copied from a baby's chart.  NICU Lactation Discharge Note:    Latch assist: Mom is completely independent with breast feeding; parents utilize lactation scale (while here) to assess milk transfer and infants are taking up to full feedings from the breast-praised mom. Dad present,supportive and helpful. Parents very comfortable with cares and feedings independently.   Discussed importance of a deep latch, signs of a good latch, signs of milk transfer, and how to know if baby is getting enough.  Feeding plan for home: mom plans to continue breast feeding each baby once daily at least and will continue to pump 6 times daily, to provide as much ebm as possible for their babies by bottle, supplementing with formula as needed. We reviewed Signs of Adequate Infant Intake:           Your babies' should feed 8-12 times per 24 hours.          You should feel long, rhythmic, pulling sucks and hear swallows throughout feeding          Your babies' lips should look wet at the end of the feeding          Your breasts should feel softer at the end of the feeding          Your babies' should have 5-7 pale yellow/clear, heavy, urine diapers          Your babies' should have 3-4 medium-large sized, yellow, seedy, watery stools          Your babies' should be gaining weight and follow as directed by pediatrician. Mom to call or seek outpatient lactation as needed.    Completed NICU lactation discharge teaching with good understanding verbalized by parents.  Provided parents with written handouts to reinforce verbal instructions.  Encouraged mother to participate in a breast feeding support group to facilitate meeting her breast feeding goals.  Provided parents with list of lactation community resources as well as NICU lactation contact numbers.

## 2024-12-20 ENCOUNTER — PATIENT MESSAGE (OUTPATIENT)
Dept: OBSTETRICS AND GYNECOLOGY | Facility: CLINIC | Age: 29
End: 2024-12-20
Payer: OTHER GOVERNMENT

## 2025-01-03 ENCOUNTER — POSTPARTUM VISIT (OUTPATIENT)
Dept: OBSTETRICS AND GYNECOLOGY | Facility: CLINIC | Age: 30
End: 2025-01-03
Payer: OTHER GOVERNMENT

## 2025-01-03 VITALS — BODY MASS INDEX: 32.31 KG/M2 | HEIGHT: 66 IN | WEIGHT: 201.06 LBS

## 2025-01-03 PROCEDURE — 99999 PR PBB SHADOW E&M-EST. PATIENT-LVL III: CPT | Mod: PBBFAC,,, | Performed by: OBSTETRICS & GYNECOLOGY

## 2025-01-03 NOTE — PROGRESS NOTES
"  Subjective:      Eve Salazar is a 29 y.o.  who presents for a postpartum visit.  She is status post  repeat  delivery and BTL 6 weeks ago.  Her hospitalization was not complicated. She denies signs and symptoms of postpartum depression. Reports all babies are doing well - they are all home from the NICU!    Her last pap was NILM on 2021      Review the Delivery Report for details.     Objective:     Ht 5' 6" (1.676 m)   Wt 91.2 kg (201 lb 1 oz)   LMP 2023 (Exact Date)   Breastfeeding Yes   BMI 32.45 kg/m²     General: Alert and no distress  Abdomen: well healed pfannenstiel    Assessment:      delivery delivered      Plan:     1. Return to clinic in 3-4 months for annual exam    "

## 2025-04-08 ENCOUNTER — E-VISIT (OUTPATIENT)
Dept: OBSTETRICS AND GYNECOLOGY | Facility: CLINIC | Age: 30
End: 2025-04-08
Payer: OTHER GOVERNMENT

## 2025-04-08 DIAGNOSIS — R30.0 DYSURIA: Primary | ICD-10-CM

## 2025-04-08 RX ORDER — SULFAMETHOXAZOLE AND TRIMETHOPRIM 800; 160 MG/1; MG/1
1 TABLET ORAL 2 TIMES DAILY
Qty: 6 TABLET | Refills: 0 | Status: SHIPPED | OUTPATIENT
Start: 2025-04-08 | End: 2025-04-11

## 2025-04-08 NOTE — PROGRESS NOTES
Patient ID: Eve Salazar is a 29 y.o. female.    Chief Complaint: General Illness (Entered automatically based on patient selection in [employer].)    The patient initiated a request through [employer] on 4/8/2025 for evaluation and management with a chief complaint of General Illness (Entered automatically based on patient selection in [employer].)     I evaluated the questionnaire submission on 4/8/25.    [facility]    4/8/2025  9:42 AM CDT - Filed by Patient   What do you need help with? Urinary Problems   Do you agree to participate in an E-Visit? Yes   If you have any of the following symptoms, please go to the nearest emergency room or call 911: I acknowledge   Do you have any of the following pregnancy-related conditions? None   What is the main issue you would like addressed today? I have an appointment Friday with my doctor but after a beach trip this weekend, I realize I have a UTI. Need a prescription of antibiotics.   Do you have any of the following symptoms? Discomfort or pain passing urine;  Passing urine more frequently;  Cloudy urine   When did your concern begin? 4/7/2025   What medications or treatments have you used to help your symptoms? Cranberry products;  Drinking more fluids   What does your urine look like? Cloudy   Have you had any of the following symptoms during the past 24 hours?   Urgency (a sudden and uncontrollable urge to urinate) Moderate   Frequency (going to the toilet very often) Moderate   Burning pain when urinating Moderate   Incomplete bladder emptying (still feel like you need to urinate again after urination) (None, Mild, Moderate, Severe) Severe   Pain in the lower belly when youre not urinating. None   Discomfort from your urinary symptoms. Moderate   Have you had any of the following symptoms during the past 24 hours?   Blood seen in the urine Mild   Pain in the lower back on one or both sides (flank pain) Mild   Abnormal Vaginal Discharge (abnormal  amount, color and/or odor) None   Discharge from the opening you urinate from (urethra) when not urinating. None   Have your symptoms interfered with your every day activities? Moderate   Do you have a fever? No   Are you a diabetic? No   Are you currently experiencing any of the following while completing this questionnaire? None   Do you have a history of kidney stones? No   Provide any additional information you feel is important.    Please attach any relevant images or files (if you have performed a home test for UTI, please submit a photo of results)    Are you able to take your vital signs? No         Encounter Diagnosis   Name Primary?    Dysuria Yes        No orders of the defined types were placed in this encounter.     Medications Ordered This Encounter   Medications    sulfamethoxazole-trimethoprim 800-160mg (BACTRIM DS) 800-160 mg Tab     Sig: Take 1 tablet by mouth 2 (two) times daily. for 3 days     Dispense:  6 tablet     Refill:  0      Pt has f/u visit on Friday.   Will get urine culture at that time.      E-Visit Time Trackin minutes

## 2025-04-10 ENCOUNTER — TELEPHONE (OUTPATIENT)
Dept: OBSTETRICS AND GYNECOLOGY | Facility: CLINIC | Age: 30
End: 2025-04-10
Payer: OTHER GOVERNMENT

## 2025-04-10 NOTE — PROGRESS NOTES
"Chief Complaint: Well Woman Exam     HPI:      Eve Salazar is a 29 y.o.  who presents today for well woman exam.  LMP: Patient's last menstrual period was 2023 (exact date).  No issues, problems, or complaints. Specifically, patient denies abnormal vaginal bleeding, discharge, pelvic pain, urinary problems, or changes in appetite. Ms. Salazar is currently sexually active with a single male partner. She is currently using bilateral tubal ligation for contraception. She declines STD screening today.    Previous Pap: NILM (2021)    OB History as of 4/10/2025          2    Para   2    Term   1       1    AB   0    Living   5         SAB   0    IAB   0    Ectopic   0    Multiple   1    Live Births   5               Physical Exam:      Physical Exam     /72 (Patient Position: Sitting)   Ht 5' 6" (1.676 m)   Wt 89.4 kg (196 lb 15.7 oz)   LMP 2023 (Exact Date)   Breastfeeding Yes Comment: Only at night  BMI 31.79 kg/m²   Body mass index is 31.79 kg/m².     APPEARANCE: Well nourished, well developed, in no acute distress.  PSYCH: Appropriate mood and affect.  SKIN: No acne or hirsutism  NECK: Neck symmetric without masses  NODES: No inguinal, axillary, or supraclavicular lymph node enlargement  ABDOMEN: Soft.  No tenderness or masses.    CARDIOVASCULAR: No edema of peripheral extremities  BREASTS: Symmetrical, no visible skin lesions. No palpable masses. No nipple discharge bilaterally.  PELVIC: Normal external genitalia without lesions.  Normal hair distribution.  Adequate perineal body, normal urethral meatus.  Vagina moist and well rugated. Without lesions. Vagina without abnormal discharge.  Cervix without lesions, abnormal discharge, or tenderness.. No significant cystocele or rectocele.  Bimanual exam shows uterus to be normal size, regular, mobile and nontender.  Adnexa without masses or tenderness.      A female chaperone was present for the breast and pelvic exam. "     Assessment/Plan:     Screening for cervical cancer  -     Liquid-Based Pap Smear, Screening        Follow up in about 1 year (around 4/11/2026) for Annual Exam.    Counseling:     Patient was counseled today on current ASCCP pap guidelines, the recommendation for yearly physical exams, safe driving habits, and breast self awareness. She is to see her PCP for other health maintenance.     Use of the [employer] Patient Portal discussed and encouraged during today's visit.

## 2025-04-11 ENCOUNTER — OFFICE VISIT (OUTPATIENT)
Dept: OBSTETRICS AND GYNECOLOGY | Facility: CLINIC | Age: 30
End: 2025-04-11
Payer: MEDICAID

## 2025-04-11 VITALS
DIASTOLIC BLOOD PRESSURE: 72 MMHG | SYSTOLIC BLOOD PRESSURE: 132 MMHG | WEIGHT: 197 LBS | HEIGHT: 66 IN | BODY MASS INDEX: 31.66 KG/M2

## 2025-04-11 DIAGNOSIS — Z12.4 SCREENING FOR CERVICAL CANCER: Primary | ICD-10-CM

## 2025-04-11 PROCEDURE — 99999 PR PBB SHADOW E&M-EST. PATIENT-LVL III: CPT | Mod: PBBFAC,,, | Performed by: OBSTETRICS & GYNECOLOGY

## 2025-04-11 PROCEDURE — 99213 OFFICE O/P EST LOW 20 MIN: CPT | Mod: PBBFAC | Performed by: OBSTETRICS & GYNECOLOGY

## 2025-04-25 ENCOUNTER — RESULTS FOLLOW-UP (OUTPATIENT)
Dept: OBSTETRICS AND GYNECOLOGY | Facility: CLINIC | Age: 30
End: 2025-04-25